# Patient Record
Sex: FEMALE | Race: BLACK OR AFRICAN AMERICAN | NOT HISPANIC OR LATINO | Employment: FULL TIME | URBAN - METROPOLITAN AREA
[De-identification: names, ages, dates, MRNs, and addresses within clinical notes are randomized per-mention and may not be internally consistent; named-entity substitution may affect disease eponyms.]

---

## 2017-03-24 ENCOUNTER — HOSPITAL ENCOUNTER (EMERGENCY)
Facility: HOSPITAL | Age: 22
Discharge: HOME | End: 2017-03-24
Attending: EMERGENCY MEDICINE
Payer: MEDICAID

## 2017-03-24 VITALS
DIASTOLIC BLOOD PRESSURE: 107 MMHG | TEMPERATURE: 99 F | HEART RATE: 85 BPM | WEIGHT: 176 LBS | SYSTOLIC BLOOD PRESSURE: 150 MMHG | HEIGHT: 64 IN | RESPIRATION RATE: 20 BRPM | OXYGEN SATURATION: 99 % | BODY MASS INDEX: 30.05 KG/M2

## 2017-03-24 DIAGNOSIS — L02.412 ABSCESS OF AXILLA, LEFT: Primary | ICD-10-CM

## 2017-03-24 PROCEDURE — 10060 I&D ABSCESS SIMPLE/SINGLE: CPT

## 2017-03-24 PROCEDURE — 25000003 PHARM REV CODE 250: Performed by: EMERGENCY MEDICINE

## 2017-03-24 PROCEDURE — 99283 EMERGENCY DEPT VISIT LOW MDM: CPT | Mod: 25

## 2017-03-24 RX ORDER — HYDROCODONE BITARTRATE AND ACETAMINOPHEN 5; 325 MG/1; MG/1
1 TABLET ORAL EVERY 4 HOURS PRN
Qty: 18 TABLET | Refills: 0 | Status: SHIPPED | OUTPATIENT
Start: 2017-03-24 | End: 2018-04-11

## 2017-03-24 RX ORDER — LIDOCAINE HYDROCHLORIDE 10 MG/ML
5 INJECTION INFILTRATION; PERINEURAL
Status: COMPLETED | OUTPATIENT
Start: 2017-03-24 | End: 2017-03-24

## 2017-03-24 RX ORDER — IBUPROFEN 400 MG/1
800 TABLET ORAL
Status: COMPLETED | OUTPATIENT
Start: 2017-03-24 | End: 2017-03-24

## 2017-03-24 RX ORDER — SULFAMETHOXAZOLE AND TRIMETHOPRIM 800; 160 MG/1; MG/1
1 TABLET ORAL 2 TIMES DAILY
Qty: 14 TABLET | Refills: 0 | Status: SHIPPED | OUTPATIENT
Start: 2017-03-24 | End: 2017-03-31

## 2017-03-24 RX ORDER — HYDROCODONE BITARTRATE AND ACETAMINOPHEN 5; 325 MG/1; MG/1
1 TABLET ORAL
Status: COMPLETED | OUTPATIENT
Start: 2017-03-24 | End: 2017-03-24

## 2017-03-24 RX ADMIN — IBUPROFEN 800 MG: 400 TABLET, FILM COATED ORAL at 08:03

## 2017-03-24 RX ADMIN — LIDOCAINE HYDROCHLORIDE 5 ML: 10 INJECTION, SOLUTION INFILTRATION; PERINEURAL at 08:03

## 2017-03-24 RX ADMIN — HYDROCODONE BITARTRATE AND ACETAMINOPHEN 1 TABLET: 5; 325 TABLET ORAL at 09:03

## 2017-03-24 NOTE — ED AVS SNAPSHOT
[facility]  [address]  [place] [address]               Meghna Dozier   3/24/2017  7:45 PM   ED    Description:  Female : 1995   Department:  [facility]           Your Care was Coordinated By:     Provider Role From To    Hamida Cedillo MD Attending Provider [phone] --      Reason for Visit     Abscess           Diagnoses this Visit        Comments    Abscess of axilla, left    -  Primary       ED Disposition     None           To Do List           Follow-up Information     Follow up with Primary Doctor No In 3 days.    Why:  For further care       These Medications        Disp Refills Start End    hydrocodone-acetaminophen 5-325mg (NORCO) 5-325 mg per tablet 18 tablet 0 3/24/2017     Take 1 tablet by mouth every 4 (four) hours as needed for Pain. - Oral    sulfamethoxazole-trimethoprim 800-160mg (BACTRIM DS) 800-160 mg Tab 14 tablet 0 3/24/2017 3/31/2017    Take 1 tablet by mouth 2 (two) times daily. - Oral      [facility]     [facility] Nurse Care Line -  Assistance  Registered nurses in the [facility] provide clinical advisement, health education, appointment booking, and other advisory services.  Call for this free service at [phone].             Medications           START taking these NEW medications        Refills    hydrocodone-acetaminophen 5-325mg (NORCO) 5-325 mg per tablet 0    Sig: Take 1 tablet by mouth every 4 (four) hours as needed for Pain.    Class: Print    Route: Oral    sulfamethoxazole-trimethoprim 800-160mg (BACTRIM DS) 800-160 mg Tab 0    Sig: Take 1 tablet by mouth 2 (two) times daily.    Class: Print    Route: Oral      These medications were administered today        Dose Freq    ibuprofen tablet 800 mg 800 mg ED 1 Time    Sig: Take 2 tablets (800 mg total) by mouth ED 1 Time.    Class: Normal    Route: Oral    lidocaine HCL 10 mg/ml (1%) injection 5 mL 5 mL ED 1 Time    Si  "mLs by Infiltration route ED 1 Time.    Class: Normal    Route: Infiltration    hydrocodone-acetaminophen 5-325mg per tablet 1 tablet 1 tablet ED 1 Time    Sig: Take 1 tablet by mouth ED 1 Time.    Class: Normal    Route: Oral           Verify that the below list of medications is an accurate representation of the medications you are currently taking.  If none reported, the list may be blank. If incorrect, please contact your healthcare provider. Carry this list with you in case of emergency.           Current Medications     hydrocodone-acetaminophen 5-325mg (NORCO) 5-325 mg per tablet Take 1 tablet by mouth every 4 (four) hours as needed for Pain.    sulfamethoxazole-trimethoprim 800-160mg (BACTRIM DS) 800-160 mg Tab Take 1 tablet by mouth 2 (two) times daily.           Clinical Reference Information           Your Vitals Were     BP Pulse Temp Resp Height Weight    156/79 (BP Location: Right arm, Patient Position: Lying) 103 98.4 °F (36.9 °C) (Oral) 16 5' 4" (1.626 m) 79.8 kg (176 lb)    Last Period SpO2 BMI          03/10/2017 (Approximate) 98% 30.21 kg/m2        Allergies as of 3/24/2017     No Known Allergies      Immunizations Administered on Date of Encounter - 3/24/2017     None      ED Micro, Lab, POCT     None      ED Imaging Orders     None        Discharge Instructions         Abscess (Incision & Drainage)  An abscess (sometimes called a boil) occurs when bacteria get trapped under the skin and start to grow. Pus forms inside the abscess as the body responds to the bacteria. An abscess can happen with an insect bite, ingrown hair, blocked oil gland, pimple, cyst, or puncture wound.  Your healthcare provider has drained the pus from your abscess. If the abscess pocket was large, your healthcare provider may have inserted gauze packing. Your provider will need to remove and possibly replace it on your next visit. You may not need antibiotics to treat a simple abscess, unless the infection is spreading " into the skin around the wound (cellulitis).  Healing of the wound will take about 1 to 2 weeks, depending on the size of the abscess. Healthy tissue will grow from the bottom and sides of the opening until it seals over.  Home care  These tips can help your wound heal:  · The wound may drain for the first 2 days. Cover the wound with a clean dry dressing. If the dressing becomes soaked with blood or pus, change it.  · If a gauze packing was placed inside the abscess cavity, you may be told to remove it yourself. You may do this in the shower. Once the packing is removed, you should wash the area in the shower or bath 3 to 4 times a day, until the skin opening has closed. Make sure you wash your hands after changing the packing or cleaning the wound.  · If you were prescribed antibiotics, take them as directed until they are all gone.  · You may use acetaminophen or ibuprofen to control pain, unless another pain medicine was prescribed. If you have liver disease or ever had a stomach ulcer, talk with your doctor before using these medicines.  Follow-up care  Follow up with your healthcare provider, or as advised. If a gauze packing was inserted in your wound, it should be removed in 1 to 2 days. Check your wound every day for the signs of worsening infection listed below.  When to seek medical advice  Call your healthcare provider right away if any of these occur:  · Increasing redness or swelling  · Red streaks in the skin leading away from the wound  · Increasing local pain or swelling  · Continued pus draining from the wound 2 days after treatment  · Fever of 100.4ºF (38ºC) or higher, or as directed by your healthcare provider  · Boil returns when you are at home  Date Last Reviewed: 9/1/2016  © 9911-8655 [employer]. [address] [place] [address], [place] [address]. All rights reserved. This information is not intended as a substitute for professional medical care. Always follow your healthcare  professional's instructions.          [facility] Sign-Up     Activating your [facility] account is as easy as 1-2-3!     1) Visit [email or web address], select Sign Up Now, enter this activation code and your date of birth, then select Next.  [email or web address]  Expires: 5/8/2017  9:22 PM      2) Create a username and password to use when you visit [facility] in the future and select a security question in case you lose your password and select Next.    3) Enter your e-mail address and click Sign Up!    Additional Information  If you have questions, please e-mail [email or web address] or call [phone] to talk to our [facility] staff. Remember, [facility] is NOT to be used for urgent needs. For medical emergencies, dial 911.          [facility] complies with applicable Federal civil rights laws and does not discriminate on the basis of race, color, national origin, age, disability, or sex.        Language Assistance Services     ATTENTION: Language assistance services are available, free of charge. Please call [phone].      ATENCIÓN: Si habla español, tiene a saldaña disposición servicios gratuitos de asistencia lingüística. Llame al [phone].     CHÚ Ý: N?u b?n nói Ti?ng Vi?t, có các d?ch v? h? tr? ngôn ng? mi?n phí dành cho b?n. G?i s? [phone].

## 2017-03-25 NOTE — ED PROVIDER NOTES
Encounter Date: 3/24/2017       History     Chief Complaint   Patient presents with    Abscess     Review of patient's allergies indicates:  No Known Allergies  Patient is a 21 y.o. female presenting with the following complaint: abscess. The history is provided by the patient.   Abscess    This is a new problem. The current episode started two days ago. The problem occurs continuously. The problem has been unchanged. Affected Location: Left axilla. The pain is at a severity of 8/10. The abscess is characterized by redness, painfulness, burning and draining. Pertinent negatives include not sleeping less, no fever, no diarrhea, no vomiting and no congestion.     Past Medical History:   Diagnosis Date    Anxiety      History reviewed. No pertinent surgical history.  History reviewed. No pertinent family history.  Social History   Substance Use Topics    Smoking status: Never Smoker    Smokeless tobacco: None    Alcohol use No     Review of Systems   Constitutional: Negative for fever.   HENT: Negative for congestion.    Gastrointestinal: Negative for diarrhea and vomiting.   Skin: Positive for wound.   All other systems reviewed and are negative.      Physical Exam   Initial Vitals   BP Pulse Resp Temp SpO2   03/24/17 1946 03/24/17 1946 03/24/17 1946 03/24/17 1946 03/24/17 1946   156/79 103 16 98.4 °F (36.9 °C) 98 %     Physical Exam    Nursing note and vitals reviewed.  Constitutional: She appears well-developed and well-nourished.   HENT:   Head: Normocephalic and atraumatic.   Eyes: EOM are normal.   Neck: Normal range of motion. Neck supple.   Cardiovascular: Normal rate, regular rhythm, normal heart sounds and intact distal pulses.   Pulmonary/Chest: Breath sounds normal.   Abdominal: Soft.   Musculoskeletal: Normal range of motion.   Neurological: She is alert and oriented to person, place, and time.   Skin: Skin is warm and dry. Abscess noted.        Psychiatric: She has a normal mood and affect. Her  behavior is normal. Judgment and thought content normal.         ED Course   I & D - Incision and Drainage  Date/Time: 3/24/2017 9:16 PM  Location procedure was performed: [facility] EMERGENCY DEPARTMENT  Performed by: HAMIDA CEDILLO  Authorized by: HAMIDA CEDILLO   Assisting provider: HAMIDA CEDILLO  Pre-operative diagnosis: Left axillary abscess  Post-operative diagnosis: Left axillary abscess  Consent Done: Emergent Situation  Type: abscess  Body area: upper extremity  Location details: left shoulder  Anesthesia: local infiltration    Anesthesia:  Anesthesia: local infiltration  Local Anesthetic: lidocaine 1% without epinephrine   Anesthetic total: 4 mL  Patient sedated: no  Scalpel size: 11  Incision type: single straight  Complexity: simple  Drainage: pus  Drainage amount: moderate  Wound treatment: incision,  wound left open,  drainage and  wound packed  Packing material: 1/4 in iodoform gauze  Complications: No  Specimens: No  Implants: No        Labs Reviewed - No data to display                            ED Course     Clinical Impression:   The encounter diagnosis was Abscess of axilla, left.    Disposition:   Disposition: Discharged  Condition: Stable       Hamida Cedillo MD  03/24/17 6726

## 2017-03-25 NOTE — ED NOTES
Pt reports having an abscess in her armpit for two days causing pain. Pt awake and alert at this time and in stable condition.

## 2017-04-01 ENCOUNTER — HOSPITAL ENCOUNTER (EMERGENCY)
Facility: HOSPITAL | Age: 22
Discharge: HOME | End: 2017-04-01
Attending: EMERGENCY MEDICINE
Payer: MEDICAID

## 2017-04-01 VITALS
SYSTOLIC BLOOD PRESSURE: 146 MMHG | WEIGHT: 175 LBS | OXYGEN SATURATION: 98 % | HEART RATE: 76 BPM | BODY MASS INDEX: 29.88 KG/M2 | DIASTOLIC BLOOD PRESSURE: 95 MMHG | TEMPERATURE: 98 F | HEIGHT: 64 IN | RESPIRATION RATE: 22 BRPM

## 2017-04-01 DIAGNOSIS — N93.8 DUB (DYSFUNCTIONAL UTERINE BLEEDING): Primary | ICD-10-CM

## 2017-04-01 LAB
ALBUMIN SERPL BCP-MCNC: 3.7 G/DL
ALP SERPL-CCNC: 65 U/L
ALT SERPL W/O P-5'-P-CCNC: 12 U/L
ANION GAP SERPL CALC-SCNC: 11 MMOL/L
AST SERPL-CCNC: 15 U/L
B-HCG UR QL: NEGATIVE
BACTERIA GENITAL QL WET PREP: ABNORMAL
BASOPHILS # BLD AUTO: 0.05 K/UL
BASOPHILS NFR BLD: 0.7 %
BILIRUB SERPL-MCNC: 0.6 MG/DL
BILIRUB UR QL STRIP: NEGATIVE
BUN SERPL-MCNC: 7 MG/DL
CALCIUM SERPL-MCNC: 9.9 MG/DL
CHLORIDE SERPL-SCNC: 105 MMOL/L
CLARITY UR: CLEAR
CLUE CELLS VAG QL WET PREP: ABNORMAL
CO2 SERPL-SCNC: 24 MMOL/L
COLOR UR: YELLOW
CREAT SERPL-MCNC: 0.9 MG/DL
CTP QC/QA: YES
DIFFERENTIAL METHOD: NORMAL
EOSINOPHIL # BLD AUTO: 0.2 K/UL
EOSINOPHIL NFR BLD: 2.4 %
ERYTHROCYTE [DISTWIDTH] IN BLOOD BY AUTOMATED COUNT: 12.8 %
EST. GFR  (AFRICAN AMERICAN): >60 ML/MIN/1.73 M^2
EST. GFR  (NON AFRICAN AMERICAN): >60 ML/MIN/1.73 M^2
FILAMENT FUNGI VAG WET PREP-#/AREA: ABNORMAL
GLUCOSE SERPL-MCNC: 78 MG/DL
GLUCOSE UR QL STRIP: NEGATIVE
HCT VFR BLD AUTO: 38.6 %
HGB BLD-MCNC: 12.9 G/DL
HGB UR QL STRIP: ABNORMAL
KETONES UR QL STRIP: NEGATIVE
LEUKOCYTE ESTERASE UR QL STRIP: NEGATIVE
LYMPHOCYTES # BLD AUTO: 2.5 K/UL
LYMPHOCYTES NFR BLD: 36.9 %
MCH RBC QN AUTO: 28.3 PG
MCHC RBC AUTO-ENTMCNC: 33.4 %
MCV RBC AUTO: 85 FL
MICROSCOPIC COMMENT: NORMAL
MONOCYTES # BLD AUTO: 0.8 K/UL
MONOCYTES NFR BLD: 12.3 %
NEUTROPHILS # BLD AUTO: 3.2 K/UL
NEUTROPHILS NFR BLD: 47.7 %
NITRITE UR QL STRIP: NEGATIVE
PH UR STRIP: 6 [PH] (ref 5–8)
PLATELET # BLD AUTO: 338 K/UL
PMV BLD AUTO: 10.9 FL
POTASSIUM SERPL-SCNC: 4.4 MMOL/L
PROT SERPL-MCNC: 8.2 G/DL
PROT UR QL STRIP: NEGATIVE
RBC # BLD AUTO: 4.56 M/UL
RBC #/AREA URNS HPF: 1 /HPF (ref 0–4)
SODIUM SERPL-SCNC: 140 MMOL/L
SP GR UR STRIP: 1.01 (ref 1–1.03)
SPECIMEN SOURCE: ABNORMAL
T VAGINALIS GENITAL QL WET PREP: ABNORMAL
URN SPEC COLLECT METH UR: ABNORMAL
UROBILINOGEN UR STRIP-ACNC: NEGATIVE EU/DL
WBC # BLD AUTO: 6.77 K/UL
WBC #/AREA VAG WET PREP: ABNORMAL
YEAST GENITAL QL WET PREP: ABNORMAL

## 2017-04-01 PROCEDURE — 85025 COMPLETE CBC W/AUTO DIFF WBC: CPT

## 2017-04-01 PROCEDURE — 87210 SMEAR WET MOUNT SALINE/INK: CPT

## 2017-04-01 PROCEDURE — 99284 EMERGENCY DEPT VISIT MOD MDM: CPT | Mod: 25

## 2017-04-01 PROCEDURE — 80053 COMPREHEN METABOLIC PANEL: CPT

## 2017-04-01 PROCEDURE — 87591 N.GONORRHOEAE DNA AMP PROB: CPT

## 2017-04-01 PROCEDURE — 81000 URINALYSIS NONAUTO W/SCOPE: CPT

## 2017-04-01 PROCEDURE — 81025 URINE PREGNANCY TEST: CPT | Performed by: EMERGENCY MEDICINE

## 2017-04-01 NOTE — ED TRIAGE NOTES
Pt. Presents with vaginal bleeding that began in mid-March accompanied by abdominal cramping that began yesterday.

## 2017-04-01 NOTE — ED PROVIDER NOTES
"Encounter Date: 4/1/2017    SCRIBE #1 NOTE: I, Raj Green, am scribing for, and in the presence of,  Praveena Quintanilla NP. I have scribed the following portions of the note - Other sections scribed: HPI and ROS.       History     Chief Complaint   Patient presents with    Vaginal Bleeding     pt states " I've been bleeding for a month"      Review of patient's allergies indicates:  No Known Allergies  HPI Comments: CC: Vaginal Bleeding    HPI: This 21 y.o F with anxiety presents to the ED c/o acute onset of constant vaginal bleeding x1 month. The pt also reports one episode of abdominal cramping yesterday. The pt notes her menustral cycle comes every 3 months for 3 days. The pt is not currently taking birth control or Depo-Provera. The pt states her pregnancy test was negative. The pt denies dysuria, fever, vaginal discharge or odor, change in sexual partners or activity change. No prior tx.     The history is provided by the patient. No  was used.     Past Medical History:   Diagnosis Date    Anxiety      History reviewed. No pertinent surgical history.  History reviewed. No pertinent family history.  Social History   Substance Use Topics    Smoking status: Never Smoker    Smokeless tobacco: None    Alcohol use No     Review of Systems   Constitutional: Negative for activity change and fever.   Respiratory: Negative for shortness of breath.    Cardiovascular: Negative for chest pain.   Gastrointestinal: Negative for nausea.        (+) abdominal cramping   Genitourinary: Positive for vaginal bleeding (x1 month). Negative for dysuria and vaginal discharge.   Musculoskeletal: Negative for back pain.   Skin: Negative for rash.   Neurological: Negative for weakness.   Hematological: Does not bruise/bleed easily.       Physical Exam   Initial Vitals   BP Pulse Resp Temp SpO2   04/01/17 1244 04/01/17 1244 04/01/17 1244 04/01/17 1244 04/01/17 1244   161/79 78 18 98.5 °F (36.9 °C) 99 % " "    Physical Exam    Nursing note and vitals reviewed.  Constitutional: She appears well-developed and well-nourished.   HENT:   Head: Normocephalic.   Eyes: Conjunctivae are normal.   Neck: Normal range of motion. Neck supple.   Cardiovascular: Normal rate, regular rhythm and normal heart sounds.   Pulmonary/Chest: Breath sounds normal.   Abdominal: Soft. She exhibits no distension. There is no tenderness. There is no rebound and no guarding.   Genitourinary: Vagina normal.   Genitourinary Comments: Active mild bleeding.    (-) CMT  (-) Adnexal tenderness / masses   Musculoskeletal: Normal range of motion.   Neurological: She is alert and oriented to person, place, and time.   Skin: Skin is warm and dry.         ED Course   Procedures  Labs Reviewed   URINALYSIS - Abnormal; Notable for the following:        Result Value    Occult Blood UA 2+ (*)     All other components within normal limits   VAGINAL SCREEN - Abnormal; Notable for the following:     WBC - Vaginal Screen Rare (*)     Bacteria - Vaginal Screen Rare (*)     All other components within normal limits   C. TRACHOMATIS/N. GONORRHOEAE BY AMP DNA   CBC W/ AUTO DIFFERENTIAL   COMPREHENSIVE METABOLIC PANEL   URINALYSIS MICROSCOPIC   POCT URINE PREGNANCY             Medical Decision Making:   Initial Assessment:   21-year-old female presents with vaginal bleeding ×1 month.  Differential Diagnosis:   Missed AB  Dysfunctional uterine bleeding   vaginal trauma  ED Management:  Diagnosis management comments: This is an urgent evaluation of a 21-year-old female that presented to the ER with c/o vaginal bleeding ×1 month.  Patient states that she had one cramp" yesterday. Pts exam was unremarkable except for mild vaginal bleeding.  There is no abdominal tenderness.  No cervical motion tenderness, no adnexal tenderness or mass.; pt does not appear ill or toxic.    I will send GC and chlamydia and a vaginal screen.  We will check some basic labs just to make sure her " H&H is within normal limits.  I anticipate with be fine being her vital signs are within normal limits.    Labs were reviewed and discussed with pt.     Based on exam today - I have low suspicion for medical, surgical or other life threatening illness.  Patient has been instructed to follow up with family practice physician or an OB/GYN.  Referral for OB/GYN on call given to patient.    Pt verbalizes understanding of d/c instructions and will return for worsening condition.    Case discussed with attending who agrees with A&P              Scribe Attestation:   Scribe #1: I performed the above scribed service and the documentation accurately describes the services I performed. I attest to the accuracy of the note.    Attending Attestation:           Physician Attestation for Scribe:  Physician Attestation Statement for Scribe #1: I, Praveena Quintanilla NP, reviewed documentation, as scribed by Raj Green in my presence, and it is both accurate and complete.                 ED Course     Clinical Impression:   The encounter diagnosis was DUB (dysfunctional uterine bleeding).    Disposition:   Disposition: Discharged  Condition: Stable       Praveena Quintanilla NP  04/01/17 1504

## 2017-04-01 NOTE — ED AVS SNAPSHOT
[facility]  [place] [address]               Meghna Dozier   2017 12:46 PM   ED    Description:  Female : 1995   Department:  [facility]           Your Care was Coordinated By:     Provider Role From To    Melba Preston MD Attending Provider [phone] --    Praveena Quintanilla NP Nurse Practitioner 17 [phone] --      Reason for Visit     Vaginal Bleeding           Diagnoses this Visit        Comments    DUB (dysfunctional uterine bleeding)    -  Primary       ED Disposition     None           To Do List           Follow-up Information     Schedule an appointment as soon as possible for a visit with Hubert Dominguez MD.    Specialty:  Obstetrics    Contact information:    [address]  [address]  [place] [address]  [phone]          Follow up with [facility].    Specialty:  Emergency Medicine    Why:  If symptoms worsen or any other concerns    Contact information:    [place] [address] [phone]      [facility]     [facility] Care [facility] -  Assistance  Unless otherwise directed by your provider, please contact [facility], our nurse care line that is available for  assistance.     Registered nurses in the [facility] provide: appointment scheduling, clinical advisement, health education, and other advisory services.  Call: [phone] (toll free)               Medications                Verify that the below list of medications is an accurate representation of the medications you are currently taking.  If none reported, the list may be blank. If incorrect, please contact your healthcare provider. Carry this list with you in case of emergency.           Current Medications     hydrocodone-acetaminophen 5-325mg (NORCO) 5-325 mg per tablet Take 1 tablet by mouth every 4 (four) hours as needed for Pain.           Clinical  "Reference Information           Your Vitals Were     BP Pulse Temp Resp Height Weight    161/79 (BP Location: Right arm, Patient Position: Sitting) 78 98.5 °F (36.9 °C) (Oral) 18 5' 4" (1.626 m) 79.4 kg (175 lb)    Last Period SpO2 BMI          03/10/2017 (Approximate) 99% 30.04 kg/m2        Allergies as of 4/1/2017     No Known Allergies      Immunizations Administered on Date of Encounter - 4/1/2017     None      ED Micro, Lab, POCT     Start Ordered       Status Ordering Provider    04/01/17 1301 04/01/17 1300  CBC W/ AUTO DIFFERENTIAL  STAT      Preliminary result     04/01/17 1301 04/01/17 1300  Comp. Metabolic Panel  STAT      Final result     04/01/17 1301 04/01/17 1300  Urinalysis  STAT      Final result     04/01/17 1301 04/01/17 1300  C. trachomatis/N. gonorrhoeae by AMP DNA Cervix  STAT      In process     04/01/17 1301 04/01/17 1300  Vaginal Screen Vagina  STAT      Final result     04/01/17 1300 04/01/17 1300  Urinalysis Microscopic  Once      Final result     04/01/17 1256 04/01/17 1256  POCT urine pregnancy  Once      Final result       ED Imaging Orders     None        Discharge Instructions         Understanding Uterine Bleeding  Your uterine bleeding may be heavy. Or you may have bleeding between periods. These problems may be caused by hormonal imbalance. Or they can be caused by uterine growths, an intrauterine device (IUD), bleeding disorder, or pregnancy.  Hormonal imbalance  Your menstrual cycle is controlled by hormones. The hormones include estrogen and progesterone. Sometimes there is too much or too little of 1 or both of these hormones. This can cause heavy periods. Or it can cause bleeding between periods. Causes of hormonal imbalance can include:  · Hormonal changes in teens and in women nearing menopause  · Diabetes, thyroid disease, or other medical problems  · Obesity  · Stress  · Strenuous exercise  · Anorexia (an eating disorder)  · Pregnancy  Uterine growths  There are different " kinds of uterine growths. These include:  · Fibroids. These are round knots of muscle tissue in the uterus.  · Polyps. These are soft tissue growths in the uterine lining. They often hang into the uterus.  · Adenomyosis. This is when the uterine lining grows into the muscle wall.  · Hyperplasia. This is when the uterine lining gets too thick or grows too much.  · Endometrial cancer. This is uncontrolled growth of part of the uterine lining.  Other causes of uterine bleeding  There are other causes of uterine bleeding. These include:  · IUD (intrauterine device). This is a method of birth control. Some IUDs contain hormones.  · Bleeding disorders. This is when the blood can't clot properly.  Treatment  Your health care provider can help diagnose the cause of your bleeding problem. He or she will work then work with you to plan treatment as needed.  Date Last Reviewed: 7/6/2015  © 7136-3327 [employer]. [address] [place] [address]. All rights reserved. This information is not intended as a substitute for professional medical care. Always follow your healthcare professional's instructions.          [facility] Sign-Up     Activating your [facility] account is as easy as 1-2-3!     1) Visit [email or web address], select Sign Up Now, enter this activation code and your date of birth, then select Next.  [email or web address]  Expires: 5/8/2017  9:22 PM      2) Create a username and password to use when you visit [facility] in the future and select a security question in case you lose your password and select Next.    3) Enter your e-mail address and click Sign Up!    Additional Information  If you have questions, please e-mail [email or web address] or call [phone] to talk to our [facility] staff. Remember, [facility] is NOT to be used for urgent needs. For medical emergencies, dial 911.          [facility] complies with applicable Federal civil rights laws and does not discriminate on  the basis of race, color, national origin, age, disability, or sex.        Language Assistance Services     ATTENTION: Language assistance services are available, free of charge. Please call [phone].      ATENCIÓN: Si ramirez hollis, tiene a saldaña disposición servicios gratuitos de asistencia lingüística. Llame al [phone].     CHÚ Ý: N?u b?n nói Ti?ng Vi?t, có các d?ch v? h? tr? ngôn ng? mi?n phí dành cho b?n. G?i s? [phone].

## 2017-04-01 NOTE — DISCHARGE INSTRUCTIONS
Understanding Uterine Bleeding  Your uterine bleeding may be heavy. Or you may have bleeding between periods. These problems may be caused by hormonal imbalance. Or they can be caused by uterine growths, an intrauterine device (IUD), bleeding disorder, or pregnancy.  Hormonal imbalance  Your menstrual cycle is controlled by hormones. The hormones include estrogen and progesterone. Sometimes there is too much or too little of 1 or both of these hormones. This can cause heavy periods. Or it can cause bleeding between periods. Causes of hormonal imbalance can include:  · Hormonal changes in teens and in women nearing menopause  · Diabetes, thyroid disease, or other medical problems  · Obesity  · Stress  · Strenuous exercise  · Anorexia (an eating disorder)  · Pregnancy  Uterine growths  There are different kinds of uterine growths. These include:  · Fibroids. These are round knots of muscle tissue in the uterus.  · Polyps. These are soft tissue growths in the uterine lining. They often hang into the uterus.  · Adenomyosis. This is when the uterine lining grows into the muscle wall.  · Hyperplasia. This is when the uterine lining gets too thick or grows too much.  · Endometrial cancer. This is uncontrolled growth of part of the uterine lining.  Other causes of uterine bleeding  There are other causes of uterine bleeding. These include:  · IUD (intrauterine device). This is a method of birth control. Some IUDs contain hormones.  · Bleeding disorders. This is when the blood can't clot properly.  Treatment  Your health care provider can help diagnose the cause of your bleeding problem. He or she will work then work with you to plan treatment as needed.  Date Last Reviewed: 7/6/2015  © 5910-3412 [employer]. [address] [place] [address], [place] [address]. All rights reserved. This information is not intended as a substitute for professional medical care. Always follow your healthcare professional's  instructions.

## 2017-04-02 LAB
C TRACH DNA SPEC QL NAA+PROBE: NOT DETECTED
N GONORRHOEA DNA SPEC QL NAA+PROBE: NOT DETECTED

## 2017-07-27 ENCOUNTER — HOSPITAL ENCOUNTER (EMERGENCY)
Facility: HOSPITAL | Age: 22
Discharge: HOME | End: 2017-07-28
Attending: EMERGENCY MEDICINE
Payer: COMMERCIAL

## 2017-07-27 DIAGNOSIS — D50.0 IRON DEFICIENCY ANEMIA DUE TO CHRONIC BLOOD LOSS: Primary | ICD-10-CM

## 2017-07-27 DIAGNOSIS — R05.9 COUGH: ICD-10-CM

## 2017-07-27 LAB
ANION GAP SERPL CALC-SCNC: 10 MMOL/L
B-HCG UR QL: NEGATIVE
BASOPHILS # BLD AUTO: 0.05 K/UL
BASOPHILS NFR BLD: 0.6 %
BILIRUB UR QL STRIP: NEGATIVE
BUN SERPL-MCNC: 15 MG/DL
CALCIUM SERPL-MCNC: 9.9 MG/DL
CHLORIDE SERPL-SCNC: 106 MMOL/L
CLARITY UR: CLEAR
CO2 SERPL-SCNC: 26 MMOL/L
COLOR UR: YELLOW
CREAT SERPL-MCNC: 0.9 MG/DL
CTP QC/QA: YES
DIFFERENTIAL METHOD: ABNORMAL
EOSINOPHIL # BLD AUTO: 0.2 K/UL
EOSINOPHIL NFR BLD: 1.9 %
ERYTHROCYTE [DISTWIDTH] IN BLOOD BY AUTOMATED COUNT: 14 %
EST. GFR  (AFRICAN AMERICAN): >60 ML/MIN/1.73 M^2
EST. GFR  (NON AFRICAN AMERICAN): >60 ML/MIN/1.73 M^2
GLUCOSE SERPL-MCNC: 107 MG/DL
GLUCOSE UR QL STRIP: NEGATIVE
HCT VFR BLD AUTO: 32.7 %
HGB BLD-MCNC: 10.3 G/DL
HGB UR QL STRIP: NEGATIVE
KETONES UR QL STRIP: NEGATIVE
LEUKOCYTE ESTERASE UR QL STRIP: NEGATIVE
LYMPHOCYTES # BLD AUTO: 2.8 K/UL
LYMPHOCYTES NFR BLD: 31.3 %
MAGNESIUM SERPL-MCNC: 2.1 MG/DL
MCH RBC QN AUTO: 25.4 PG
MCHC RBC AUTO-ENTMCNC: 31.5 G/DL
MCV RBC AUTO: 81 FL
MONOCYTES # BLD AUTO: 0.7 K/UL
MONOCYTES NFR BLD: 8 %
NEUTROPHILS # BLD AUTO: 5.3 K/UL
NEUTROPHILS NFR BLD: 58.2 %
NITRITE UR QL STRIP: NEGATIVE
PH UR STRIP: 5 [PH] (ref 5–8)
PLATELET # BLD AUTO: 403 K/UL
PMV BLD AUTO: 9.9 FL
POTASSIUM SERPL-SCNC: 4 MMOL/L
PROT UR QL STRIP: NEGATIVE
RBC # BLD AUTO: 4.06 M/UL
SODIUM SERPL-SCNC: 142 MMOL/L
SP GR UR STRIP: 1.02 (ref 1–1.03)
TSH SERPL DL<=0.005 MIU/L-ACNC: 1.23 UIU/ML
URN SPEC COLLECT METH UR: NORMAL
UROBILINOGEN UR STRIP-ACNC: NEGATIVE EU/DL
WBC # BLD AUTO: 9.01 K/UL

## 2017-07-27 PROCEDURE — 85025 COMPLETE CBC W/AUTO DIFF WBC: CPT

## 2017-07-27 PROCEDURE — 81025 URINE PREGNANCY TEST: CPT | Performed by: EMERGENCY MEDICINE

## 2017-07-27 PROCEDURE — 99284 EMERGENCY DEPT VISIT MOD MDM: CPT

## 2017-07-27 PROCEDURE — 81003 URINALYSIS AUTO W/O SCOPE: CPT

## 2017-07-27 PROCEDURE — 86850 RBC ANTIBODY SCREEN: CPT

## 2017-07-27 PROCEDURE — 80048 BASIC METABOLIC PNL TOTAL CA: CPT

## 2017-07-27 PROCEDURE — 93005 ELECTROCARDIOGRAM TRACING: CPT

## 2017-07-27 PROCEDURE — 84443 ASSAY THYROID STIM HORMONE: CPT

## 2017-07-27 PROCEDURE — 83735 ASSAY OF MAGNESIUM: CPT

## 2017-07-27 PROCEDURE — 86900 BLOOD TYPING SEROLOGIC ABO: CPT

## 2017-07-27 RX ORDER — AMLODIPINE BESYLATE 10 MG/1
10 TABLET ORAL DAILY
COMMUNITY
End: 2018-07-11

## 2017-07-27 RX ORDER — ONDANSETRON 4 MG/1
4 TABLET, ORALLY DISINTEGRATING ORAL EVERY 8 HOURS PRN
Qty: 12 TABLET | Refills: 0 | Status: SHIPPED | OUTPATIENT
Start: 2017-07-27 | End: 2018-04-11

## 2017-07-27 RX ORDER — FERROUS SULFATE 325(65) MG
325 TABLET ORAL DAILY
Qty: 30 TABLET | Refills: 2 | Status: SHIPPED | OUTPATIENT
Start: 2017-07-27 | End: 2018-04-11

## 2017-07-28 VITALS
TEMPERATURE: 98 F | SYSTOLIC BLOOD PRESSURE: 149 MMHG | OXYGEN SATURATION: 100 % | HEART RATE: 98 BPM | RESPIRATION RATE: 18 BRPM | DIASTOLIC BLOOD PRESSURE: 84 MMHG

## 2017-07-28 LAB
ABO + RH BLD: NORMAL
BLD GP AB SCN CELLS X3 SERPL QL: NORMAL

## 2017-07-28 NOTE — ED TRIAGE NOTES
Pt states loss of appetite, fatigue x 3 days. Pt states her PCP told her she was anemic. Pt states 3 falls in last week. Denies pain, nv.

## 2017-07-28 NOTE — ED PROVIDER NOTES
"Encounter Date: 7/27/2017    SCRIBE #1 NOTE: I, Alana Green, am scribing for, and in the presence of,  Siddhartha Smith MD. I have scribed the following portions of the note - Other sections scribed: HPI/ROS/PE.       History     Chief Complaint   Patient presents with    Fatigue     decreased appetite, weakness, fell in shower 3 times     CC: Fatigue    HPI: This 21 y.o. Female with a medical history of anxiety and HTN presents to the ED c/o constant, moderate (7/10) fatigue with associated appetite decrease ("not hungry"), generalized weakness, and abdominal pain "for the past few days." She states that she rarely and only "eats small bits" of food including pork chops, but drinks water. Patient reports a dry cough for the past 1.5 months with associated headaches. Patient notes 2 episodes of "passing out" (5 and 6 days ago) while in the shower. She reports seeing her PCP 5 days ago where she was diagnosed with anemia after stating that "I eat a lot of ice." She was then prescribed iron pills. Patient also reports constant vaginal bleeding since March, but symptoms resided when she took Provera prescribed by an OBGYN at [facility]. No other tx reported. No alleviating or exacerbating factors. Patient denies LOC, fever, chills, chest pain, and congestion.       The history is provided by the patient. No  was used.     Review of patient's allergies indicates:  No Known Allergies  Past Medical History:   Diagnosis Date    Anxiety     Hypertension      No past surgical history on file.  No family history on file.  Social History   Substance Use Topics    Smoking status: Never Smoker    Smokeless tobacco: Not on file    Alcohol use No     Review of Systems   Constitutional: Positive for appetite change (decrease) and fatigue. Negative for chills and fever.   HENT: Negative for congestion.    Eyes: Negative for visual disturbance.   Respiratory: Positive for cough (dry).  "   Cardiovascular: Negative for chest pain.   Gastrointestinal: Positive for abdominal pain. Negative for diarrhea, nausea and vomiting.   Genitourinary: Negative for difficulty urinating.   Musculoskeletal: Negative for back pain, neck pain and neck stiffness.   Skin: Negative for rash.   Neurological: Positive for weakness (generalized) and headaches. Negative for numbness.       Physical Exam     Initial Vitals [07/27/17 2051]   BP Pulse Resp Temp SpO2   (!) 143/80 (!) 120 16 98.3 °F (36.8 °C) 100 %      MAP       101         Physical Exam    Nursing note and vitals reviewed.  Constitutional: She appears well-developed and well-nourished.  Non-toxic appearance. She does not appear ill. No distress.   HENT:   Head: Normocephalic and atraumatic.   Eyes: Conjunctivae and EOM are normal. Pupils are equal, round, and reactive to light.   Neck: Normal range of motion. Neck supple.   Cardiovascular: Regular rhythm. Tachycardia present.  Exam reveals no gallop and no friction rub.    No murmur heard.  Pulmonary/Chest: Effort normal and breath sounds normal. No respiratory distress. She has no wheezes. She exhibits no tenderness.   Abdominal: Soft. Normal appearance and bowel sounds are normal. She exhibits no distension. There is no tenderness.   Musculoskeletal: Normal range of motion. She exhibits no edema.   Neurological: She is alert. No cranial nerve deficit or sensory deficit.   Skin: Skin is warm and dry. No erythema.   Psychiatric: She has a normal mood and affect.         ED Course   Procedures  Labs Reviewed   CBC W/ AUTO DIFFERENTIAL   BASIC METABOLIC PANEL   MAGNESIUM   URINALYSIS   TSH   POCT URINE PREGNANCY   TYPE & SCREEN             Medical Decision Making:   Patient appears very well.  She likely has iron deficiency anemia.  She also has a decreased appetite.  Her electrolyte stable.  She has no leukocytosis abdominal tenderness fever or evidence of urinary tract infection.  I will start her on Prilosec  and Zofran though hopefully help increase her appetite.  Her chest x-ray shows no signs of acute cardio pulmonary disease.  She was prescribed Provera by an OB/GYN several months ago and has evidence of facial hair.  I wonder if she has PEC OS causing her chronic bleeding.  Her bleeding has stopped since she took Provera 2 days ago.  This will likely help with her anemia as well as starting oral iron supplementation.  I don't believe she needs admission at this time for anemia.  Her tachycardia is relatively unexplained but she doesn't appear to be septic or toxic.            Scribe Attestation:   Scribe #1: I performed the above scribed service and the documentation accurately describes the services I performed. I attest to the accuracy of the note.    Attending Attestation:           Physician Attestation for Scribe:  Physician Attestation Statement for Scribe #1: I, Siddhartha Smith MD, reviewed documentation, as scribed by Alana Green in my presence, and it is both accurate and complete.                 ED Course     Clinical Impression:   The primary encounter diagnosis was Iron deficiency anemia due to chronic blood loss. A diagnosis of Cough was also pertinent to this visit.                           Siddhartha Smith MD  07/28/17 7652

## 2017-10-17 ENCOUNTER — CLINICAL SUPPORT (OUTPATIENT)
Dept: OCCUPATIONAL MEDICINE | Facility: CLINIC | Age: 22
End: 2017-10-17

## 2017-10-17 DIAGNOSIS — Z02.1 PRE-EMPLOYMENT DRUG SCREENING: Primary | ICD-10-CM

## 2017-10-17 NOTE — PROGRESS NOTES
Subjective:       Patient ID: Meghna Dozier is a 22 y.o. female.    Chief Complaint: Drug / Alcohol Assessment    Patient here for drug screen.      ROS    Objective:      Physical Exam    Assessment:       No diagnosis found.    Plan:                   No Follow-up on file.

## 2017-12-20 ENCOUNTER — HOSPITAL ENCOUNTER (EMERGENCY)
Facility: HOSPITAL | Age: 22
Discharge: HOME | End: 2017-12-20
Attending: FAMILY MEDICINE
Payer: COMMERCIAL

## 2017-12-20 VITALS
RESPIRATION RATE: 19 BRPM | HEART RATE: 98 BPM | WEIGHT: 170 LBS | DIASTOLIC BLOOD PRESSURE: 76 MMHG | SYSTOLIC BLOOD PRESSURE: 133 MMHG | TEMPERATURE: 98 F | OXYGEN SATURATION: 99 % | BODY MASS INDEX: 32.12 KG/M2

## 2017-12-20 DIAGNOSIS — R05.9 COUGH: ICD-10-CM

## 2017-12-20 DIAGNOSIS — F41.9 ANXIETY: Primary | ICD-10-CM

## 2017-12-20 DIAGNOSIS — R07.9 CHEST PAIN: ICD-10-CM

## 2017-12-20 DIAGNOSIS — R05.3 CHRONIC COUGH: ICD-10-CM

## 2017-12-20 LAB
B-HCG UR QL: NEGATIVE
FLUAV AG SPEC QL IA: NEGATIVE
FLUBV AG SPEC QL IA: NEGATIVE
SPECIMEN SOURCE: NORMAL

## 2017-12-20 PROCEDURE — 81025 URINE PREGNANCY TEST: CPT

## 2017-12-20 PROCEDURE — 93005 ELECTROCARDIOGRAM TRACING: CPT

## 2017-12-20 PROCEDURE — 93010 ELECTROCARDIOGRAM REPORT: CPT | Mod: ,,, | Performed by: INTERNAL MEDICINE

## 2017-12-20 PROCEDURE — 99284 EMERGENCY DEPT VISIT MOD MDM: CPT | Mod: 25

## 2017-12-20 PROCEDURE — 87400 INFLUENZA A/B EACH AG IA: CPT

## 2017-12-21 NOTE — ED PROVIDER NOTES
Encounter Date: 12/20/2017       History     Chief Complaint   Patient presents with    Cough     cough for 6 months. Chest pain for 1 week that radiates to left arm    Chest Pain     Patient complains of chronic cough since last 6 months.  Patient says it is associated with mild sputum production which is white in color.  Cough happens mostly at night.  She seen by PCP who thinks it is postnasal drip.  Patient denies any fever.  Denies taking her hypertension medication for last 1 month.  No chest pain.  Complains of neck pain and left arm pain from coughing which started yesterday.  Denies any abdominal pain, nausea or vomiting.      The history is provided by the patient.     Review of patient's allergies indicates:  No Known Allergies  Past Medical History:   Diagnosis Date    Anxiety     Hypertension      History reviewed. No pertinent surgical history.  History reviewed. No pertinent family history.  Social History   Substance Use Topics    Smoking status: Never Smoker    Smokeless tobacco: Not on file    Alcohol use No     Review of Systems   Constitutional: Negative for activity change, appetite change and fever.   HENT: Negative for congestion, ear discharge, ear pain, rhinorrhea and sore throat.    Eyes: Negative for pain, discharge, redness and itching.   Respiratory: Negative for cough, shortness of breath and wheezing.    Cardiovascular: Negative for chest pain and palpitations.   Gastrointestinal: Negative for abdominal distention, abdominal pain, diarrhea, nausea and vomiting.   Genitourinary: Negative for dysuria, flank pain and frequency.   Musculoskeletal: Negative for back pain, gait problem, neck pain and neck stiffness.   Skin: Negative for rash.   Neurological: Negative for dizziness, light-headedness and headaches.   Psychiatric/Behavioral: Negative for confusion and hallucinations.   All other systems reviewed and are negative.      Physical Exam     Initial Vitals [12/20/17 2137]   BP  Pulse Resp Temp SpO2   135/87 110 20 97.9 °F (36.6 °C) 100 %      MAP       103         Physical Exam    Nursing note and vitals reviewed.  Constitutional: She appears well-developed and well-nourished.   HENT:   Head: Normocephalic.   Right Ear: External ear normal.   Left Ear: External ear normal.   Nose: Nose normal.   Mouth/Throat: Oropharynx is clear and moist.   Eyes: Conjunctivae and EOM are normal. Pupils are equal, round, and reactive to light.   Neck: Normal range of motion. Neck supple.   Cardiovascular: Normal rate, regular rhythm, normal heart sounds and intact distal pulses.   Pulmonary/Chest: Breath sounds normal. No respiratory distress. She has no wheezes. She has no rhonchi. She has no rales. She exhibits no tenderness.   Abdominal: Soft. Bowel sounds are normal. She exhibits no distension. There is no tenderness. There is no rebound and no guarding.   Musculoskeletal: Normal range of motion.   Neurological: She is alert and oriented to person, place, and time. She has normal strength and normal reflexes. She displays normal reflexes. No cranial nerve deficit or sensory deficit.   Skin: Skin is warm. Capillary refill takes less than 2 seconds. No rash noted.         ED Course   Procedures  Labs Reviewed   INFLUENZA A AND B ANTIGEN   PREGNANCY TEST, URINE RAPID          X-Rays:   Independently Interpreted Readings:   Chest X-Ray: Normal heart size.  No infiltrates.  No acute abnormalities.     Medical Decision Making:   Initial Assessment:   Patient presents to ER with chronic intermittent nocturnal cough which sometimes productive.  No fever.  Patient also has history of anxiety and has shortness of breath.  Currently patient is satting 100% on room air.  Without any respiratory distress.  Breathing is held voluntarily.  Bilateral good breath sounds expansion and no wheezing or crepitus or abrasions.  Patient will be observed on cardiac monitor and x-ray has been ordered.  Differential Diagnosis:    Chronic bronchitis, ALLERGIC rhinitis, pulmonary edema, pulmonary congestion, pneumonia, pleurisy.  Clinical Tests:   Radiological Study: Ordered and Reviewed  Medical Tests: Ordered and Reviewed  ED Management:  Patient had normal EKG and x-ray.  Observed on the monitor without any arrhythmia or decreased in oxygen saturation.  Patient symptoms resolved.  Itself during the stay in the ER.  Advised to follow-up with the primary care physician for further workup as needed.  May require pulmonology consult if symptoms persist.  Her symptoms today were slightly represent anxiety episode.                   ED Course      Clinical Impression:   The primary encounter diagnosis was Anxiety. Diagnoses of Chest pain, Cough, and Chronic cough were also pertinent to this visit.    Disposition:   Disposition: Discharged  Condition: Emily Dee MD  12/21/17 0209

## 2018-04-11 ENCOUNTER — OFFICE VISIT (OUTPATIENT)
Dept: OBSTETRICS AND GYNECOLOGY | Facility: CLINIC | Age: 23
End: 2018-04-11
Payer: COMMERCIAL

## 2018-04-11 VITALS
DIASTOLIC BLOOD PRESSURE: 104 MMHG | WEIGHT: 174 LBS | HEART RATE: 96 BPM | BODY MASS INDEX: 32.85 KG/M2 | SYSTOLIC BLOOD PRESSURE: 152 MMHG | HEIGHT: 61 IN

## 2018-04-11 DIAGNOSIS — E28.2 PCOS (POLYCYSTIC OVARIAN SYNDROME): ICD-10-CM

## 2018-04-11 DIAGNOSIS — Z12.4 PAP SMEAR FOR CERVICAL CANCER SCREENING: ICD-10-CM

## 2018-04-11 DIAGNOSIS — N91.4 SECONDARY OLIGOMENORRHEA: ICD-10-CM

## 2018-04-11 DIAGNOSIS — I10 ESSENTIAL HYPERTENSION: ICD-10-CM

## 2018-04-11 DIAGNOSIS — Z01.419 ENCOUNTER FOR ANNUAL ROUTINE GYNECOLOGICAL EXAMINATION: Primary | ICD-10-CM

## 2018-04-11 PROCEDURE — 99999 PR PBB SHADOW E&M-EST. PATIENT-LVL III: CPT | Mod: PBBFAC,,, | Performed by: OBSTETRICS & GYNECOLOGY

## 2018-04-11 PROCEDURE — 99385 PREV VISIT NEW AGE 18-39: CPT | Mod: S$GLB,,, | Performed by: OBSTETRICS & GYNECOLOGY

## 2018-04-11 PROCEDURE — 88175 CYTOPATH C/V AUTO FLUID REDO: CPT

## 2018-04-11 RX ORDER — METFORMIN HYDROCHLORIDE 500 MG/1
500 TABLET ORAL
Qty: 90 TABLET | Refills: 4 | Status: SHIPPED | OUTPATIENT
Start: 2018-04-11 | End: 2018-11-22

## 2018-04-11 NOTE — PROGRESS NOTES
No chief complaint on file.      HISTORY OF PRESENT ILLNESS:   Meghna Dozier is a 22 y.o. female  who presents for well woman exam.  Patient's last menstrual period was 2018..  She has complains of only having a cycle 3 times a year. This has been this way since she started her cycles at 11-12. Doesn't seem to change if she loses weight.  Has tried to do provera in the past with no success per patient. Has hotflashes at night, denies galactorrhea. Does have hair growth under chin but reports it has always been that way. She is not using protection for the past 2 years and hasn't conceived.  Declines STD testing.      Past Medical History:   Diagnosis Date    Anxiety     Hypertension         History reviewed. No pertinent surgical history.    Social History     Social History    Marital status: Single     Spouse name: N/A    Number of children: N/A    Years of education: N/A     Occupational History    Not on file.     Social History Main Topics    Smoking status: Never Smoker    Smokeless tobacco: Never Used    Alcohol use No    Drug use: No    Sexual activity: Yes     Partners: Male     Other Topics Concern    Not on file     Social History Narrative    No narrative on file       Family History   Problem Relation Age of Onset    Hypertension Father     Diabetes Father     Hypertension Mother        OB History    Para Term  AB Living   0 0 0 0 0 0   SAB TAB Ectopic Multiple Live Births   0 0 0 0 0               COMPREHENSIVE GYN HISTORY:  PAP History: Denies abnormal Paps  Infection History: Denies STDs. Denies PID.  Benign History:Denies uterine fibroids. Denies ovarian cysts. Denies endometriosis Denies other conditions.  Cancer History: Denies cervical cancer. Denies uterine cancer or hyperplasia. Denies ovarian cancer. Denies vulvar cancer or pre-cancer. Denies vaginal cancer or pre-cancer. Denies breast cancer. Denies colon cancer.  Cycle: 11-12/2-3 a year/5-7 days changes  "pad 4x/day and almost soaked, not painful     ROS:  GENERAL: Denies weight gain or weight loss. Feeling well overall.   SKIN: Denies rash or lesions.   HEAD: Denies headache.   NODES: Denies enlarged lymph nodes.   CHEST: Denies shortness of breath.   ABDOMEN: No abdominal pain, constipation, diarrhea, nausea, vomiting or rectal bleeding.   URINARY: No frequency, dysuria, hematuria, or burning on urination.  REPRODUCTIVE: See HPI.   BREASTS: The patient denies pain, lumps, or nipple discharge.       BP (!) 152/104   Pulse 96   Ht 5' 1" (1.549 m)   Wt 78.9 kg (174 lb)   LMP 02/22/2018   BMI 32.88 kg/m²     APPEARANCE: Well nourished, well developed, in no acute distress.  NECK: Neck symmetric without  Thyromegaly. acthososis nigricans + hair growth under chin   NODES: No inguinal, cervical lymph node enlargement.  CHEST: Lungs clear to auscultation.  HEART: Regular rate and rhythm, no murmurs, rubs or gallops.  ABDOMEN: Soft. No tenderness or masses. No hernias. No hepatosplenomegaly.  BREASTS: Symmetrical, no skin changes or visible lesions. No palpable masses, nipple discharge or adenopathy bilaterally.  PELVIC:   VULVA: No lesions. Normal female genitalia.  URETHRAL MEATUS: Normal size and location, no lesions, no prolapse.  URETHRA: No masses, tenderness, prolapse or scarring.  VAGINA: Moist and well rugated, no discharge, no significant cystocele or rectocele.  CERVIX: No lesions and discharge.  UTERUS: Normal size, regular shape, mobile, non-tender, bladder base nontender.  ADNEXA: No masses or tenderness.    UPT negative    1. Encounter for annual routine gynecological examination    2. Essential hypertension    3. PCOS (polycystic ovarian syndrome)    4. Pap smear for cervical cancer screening    5. Secondary oligomenorrhea        Plan:  Routine gyn s/p normal breast exam. Pap without HPV cotesting ordered . STD testing: GC/CT/trich, syphilis, HBV/HCV and HIV declined.   2. .Discussed anovulation and " that it is not normal to go months without a cycle if you are not on birth control. We discussed she meets the criteria for PCOS based on the male pattern hair growth and cycle abnormalities. Will start metformin and slowly increase to 3 times a day. Will get lab work and Us. Also gave information on semen analysis and HSG. We will see back in 3 months and she will work on weight loss in the meantime. If cycles haven't regulated can do provera and consider clomid. Recommend at least semen analysis before trial of clomid and if no success then would strongly recommend HSG.     F/u in 3 months.

## 2018-05-23 ENCOUNTER — PATIENT MESSAGE (OUTPATIENT)
Dept: OBSTETRICS AND GYNECOLOGY | Facility: CLINIC | Age: 23
End: 2018-05-23

## 2018-05-23 ENCOUNTER — TELEPHONE (OUTPATIENT)
Dept: OBSTETRICS AND GYNECOLOGY | Facility: CLINIC | Age: 23
End: 2018-05-23

## 2018-07-11 ENCOUNTER — OFFICE VISIT (OUTPATIENT)
Dept: OBSTETRICS AND GYNECOLOGY | Facility: CLINIC | Age: 23
End: 2018-07-11
Payer: COMMERCIAL

## 2018-07-11 VITALS
WEIGHT: 177 LBS | DIASTOLIC BLOOD PRESSURE: 67 MMHG | BODY MASS INDEX: 33.42 KG/M2 | HEIGHT: 61 IN | SYSTOLIC BLOOD PRESSURE: 105 MMHG

## 2018-07-11 DIAGNOSIS — E28.2 PCOS (POLYCYSTIC OVARIAN SYNDROME): Primary | ICD-10-CM

## 2018-07-11 PROCEDURE — 3078F DIAST BP <80 MM HG: CPT | Mod: CPTII,S$GLB,, | Performed by: OBSTETRICS & GYNECOLOGY

## 2018-07-11 PROCEDURE — 3074F SYST BP LT 130 MM HG: CPT | Mod: CPTII,S$GLB,, | Performed by: OBSTETRICS & GYNECOLOGY

## 2018-07-11 PROCEDURE — 3008F BODY MASS INDEX DOCD: CPT | Mod: CPTII,S$GLB,, | Performed by: OBSTETRICS & GYNECOLOGY

## 2018-07-11 PROCEDURE — 99999 PR PBB SHADOW E&M-EST. PATIENT-LVL III: CPT | Mod: PBBFAC,,, | Performed by: OBSTETRICS & GYNECOLOGY

## 2018-07-11 PROCEDURE — 99213 OFFICE O/P EST LOW 20 MIN: CPT | Mod: S$GLB,,, | Performed by: OBSTETRICS & GYNECOLOGY

## 2018-07-11 NOTE — PROGRESS NOTES
Chief Complaint   Patient presents with    Follow-up       HISTORY OF PRESENT ILLNESS:   Meghna Dozier is a 22 y.o. female  who presents for follow up for suspected PCOS.  Patient's last menstrual period was 2018..  She started the metformin and only took it once a day for a month. She noticed that she got her cycle on time those months when she was taking it. She did the ovulation predictor kits but no matter when she took it the kit said she was ovulating. She stopped taking the metformin due to GI side effects.      Past Medical History:   Diagnosis Date    Anxiety     Hypertension         History reviewed. No pertinent surgical history.      Social History     Social History    Marital status: Single     Spouse name: N/A    Number of children: N/A    Years of education: N/A     Occupational History    Not on file.     Social History Main Topics    Smoking status: Never Smoker    Smokeless tobacco: Never Used    Alcohol use No    Drug use: No    Sexual activity: Yes     Partners: Male     Other Topics Concern    Not on file     Social History Narrative    No narrative on file       Family History   Problem Relation Age of Onset    Hypertension Father     Diabetes Father     Hypertension Mother          OB History    Para Term  AB Living   0 0 0 0 0 0   SAB TAB Ectopic Multiple Live Births   0 0 0 0 0               COMPREHENSIVE GYN HISTORY:  PAP History: Denies abnormal Paps  Infection History: Denies STDs. Denies PID.  Benign History:Denies uterine fibroids. Denies ovarian cysts. Denies endometriosis Denies other conditions.  Cancer History: Denies cervical cancer. Denies uterine cancer or hyperplasia. Denies ovarian cancer. Denies vulvar cancer or pre-cancer. Denies vaginal cancer or pre-cancer. Denies breast cancer. Denies colon cancer.  Cycle: -12/2-3 a year/5-7 days changes pad 4x/day and almost soaked, not painful     ROS:  GENERAL: Denies weight gain or weight loss.  "Feeling well overall.   SKIN: Denies rash or lesions.   HEAD: Denies headache.   NODES: Denies enlarged lymph nodes.   CHEST: Denies shortness of breath.   ABDOMEN: No abdominal pain, constipation, diarrhea, nausea, vomiting or rectal bleeding.   URINARY: No frequency, dysuria, hematuria, or burning on urination.  REPRODUCTIVE: See HPI.   BREASTS: The patient denies pain, lumps, or nipple discharge.       /67   Ht 5' 1" (1.549 m)   Wt 80.3 kg (177 lb 0.5 oz)   LMP 05/22/2018   BMI 33.45 kg/m²     APPEARANCE: Well nourished, well developed, in no acute distress.  NECK: Neck symmetric without  Thyromegaly. acthososis nigricans + hair growth under chin     UPT negative    1. PCOS (polycystic ovarian syndrome)        Plan:  1. Spent 10 minutes discussing PCOS, lab work and treatment. Discussed anovulation and that it is not normal to go months without a cycle if you are not on birth control. We discussed the OPK can sometimes say your ovulating if you are not if your LH is elevated which it can be when you have PCOS. She gained 3lbs since last visit. We discussed she meets the criteria for PCOS based on the male pattern hair growth and cycle abnormalities. Encouraged to try metformin again and if can't tolerate we can try the extended release. Encouraged to do the lab work and Us that we had ordered before. Also gave information on semen analysis and HSG. She should continue to work on weight loss in the meantime. If cycles haven't regulated can do provera and consider clomid. Recommend at least semen analysis before trial of clomid and if no success then would strongly recommend HSG.           "

## 2018-08-08 ENCOUNTER — TELEPHONE (OUTPATIENT)
Dept: OBSTETRICS AND GYNECOLOGY | Facility: CLINIC | Age: 23
End: 2018-08-08

## 2018-08-08 NOTE — TELEPHONE ENCOUNTER
----- Message from Poly Easton MA sent at 8/7/2018  4:20 PM CDT -----  Contact: [phone]/ self      ----- Message -----  From: Yulia Flores  Sent: 8/7/2018   2:32 PM  To: Roberto Dillard Staff    Patient requesting to speak with you regarding scheduling ultrasound due to irregular cycle. Please advise.

## 2018-08-09 ENCOUNTER — TELEPHONE (OUTPATIENT)
Dept: OBSTETRICS AND GYNECOLOGY | Facility: CLINIC | Age: 23
End: 2018-08-09

## 2018-08-09 NOTE — TELEPHONE ENCOUNTER
----- Message from Poly Easton MA sent at 8/8/2018  5:00 PM CDT -----  Contact: self/[phone]      ----- Message -----  From: Leonardo Story  Sent: 8/8/2018   9:20 AM  To: Roberto Dillard Staff    She is returning the nurse's call,

## 2018-08-17 ENCOUNTER — OFFICE VISIT (OUTPATIENT)
Dept: OBSTETRICS AND GYNECOLOGY | Facility: CLINIC | Age: 23
End: 2018-08-17
Payer: COMMERCIAL

## 2018-08-17 ENCOUNTER — LAB VISIT (OUTPATIENT)
Dept: LAB | Facility: HOSPITAL | Age: 23
End: 2018-08-17
Attending: OBSTETRICS & GYNECOLOGY
Payer: COMMERCIAL

## 2018-08-17 ENCOUNTER — PROCEDURE VISIT (OUTPATIENT)
Dept: OBSTETRICS AND GYNECOLOGY | Facility: CLINIC | Age: 23
End: 2018-08-17
Payer: COMMERCIAL

## 2018-08-17 VITALS
BODY MASS INDEX: 32.84 KG/M2 | SYSTOLIC BLOOD PRESSURE: 130 MMHG | DIASTOLIC BLOOD PRESSURE: 90 MMHG | WEIGHT: 173.94 LBS | HEIGHT: 61 IN

## 2018-08-17 DIAGNOSIS — N91.4 SECONDARY OLIGOMENORRHEA: ICD-10-CM

## 2018-08-17 DIAGNOSIS — E28.2 PCOS (POLYCYSTIC OVARIAN SYNDROME): Primary | ICD-10-CM

## 2018-08-17 DIAGNOSIS — E28.2 PCOS (POLYCYSTIC OVARIAN SYNDROME): ICD-10-CM

## 2018-08-17 DIAGNOSIS — Z01.419 ENCOUNTER FOR ANNUAL ROUTINE GYNECOLOGICAL EXAMINATION: ICD-10-CM

## 2018-08-17 LAB
BASOPHILS # BLD AUTO: 0.04 K/UL
BASOPHILS NFR BLD: 0.6 %
CHOLEST SERPL-MCNC: 192 MG/DL
CHOLEST/HDLC SERPL: 3.6 {RATIO}
DIFFERENTIAL METHOD: ABNORMAL
EOSINOPHIL # BLD AUTO: 0.1 K/UL
EOSINOPHIL NFR BLD: 1 %
ERYTHROCYTE [DISTWIDTH] IN BLOOD BY AUTOMATED COUNT: 14.9 %
FSH SERPL-ACNC: 7.1 MIU/ML
HCT VFR BLD AUTO: 38.1 %
HDLC SERPL-MCNC: 53 MG/DL
HDLC SERPL: 27.6 %
HGB BLD-MCNC: 11.9 G/DL
LDLC SERPL CALC-MCNC: 123.4 MG/DL
LH SERPL-ACNC: 14.5 MIU/ML
LYMPHOCYTES # BLD AUTO: 2.2 K/UL
LYMPHOCYTES NFR BLD: 31.2 %
MCH RBC QN AUTO: 25.1 PG
MCHC RBC AUTO-ENTMCNC: 31.2 G/DL
MCV RBC AUTO: 80 FL
MONOCYTES # BLD AUTO: 0.7 K/UL
MONOCYTES NFR BLD: 9.4 %
NEUTROPHILS # BLD AUTO: 4.1 K/UL
NEUTROPHILS NFR BLD: 57.5 %
NONHDLC SERPL-MCNC: 139 MG/DL
PLATELET # BLD AUTO: 339 K/UL
PMV BLD AUTO: 11.2 FL
PROLACTIN SERPL IA-MCNC: 8.1 NG/ML
RBC # BLD AUTO: 4.74 M/UL
TRIGL SERPL-MCNC: 78 MG/DL
TSH SERPL DL<=0.005 MIU/L-ACNC: 0.85 UIU/ML
WBC # BLD AUTO: 7.11 K/UL

## 2018-08-17 PROCEDURE — 3075F SYST BP GE 130 - 139MM HG: CPT | Mod: CPTII,S$GLB,, | Performed by: OBSTETRICS & GYNECOLOGY

## 2018-08-17 PROCEDURE — 83525 ASSAY OF INSULIN: CPT

## 2018-08-17 PROCEDURE — 99212 OFFICE O/P EST SF 10 MIN: CPT | Mod: 25,S$GLB,, | Performed by: OBSTETRICS & GYNECOLOGY

## 2018-08-17 PROCEDURE — 80061 LIPID PANEL: CPT

## 2018-08-17 PROCEDURE — 84146 ASSAY OF PROLACTIN: CPT

## 2018-08-17 PROCEDURE — 99999 PR PBB SHADOW E&M-EST. PATIENT-LVL III: CPT | Mod: PBBFAC,,, | Performed by: OBSTETRICS & GYNECOLOGY

## 2018-08-17 PROCEDURE — 83001 ASSAY OF GONADOTROPIN (FSH): CPT

## 2018-08-17 PROCEDURE — 85025 COMPLETE CBC W/AUTO DIFF WBC: CPT

## 2018-08-17 PROCEDURE — 83002 ASSAY OF GONADOTROPIN (LH): CPT

## 2018-08-17 PROCEDURE — 76830 TRANSVAGINAL US NON-OB: CPT | Mod: S$GLB,,, | Performed by: OBSTETRICS & GYNECOLOGY

## 2018-08-17 PROCEDURE — 84443 ASSAY THYROID STIM HORMONE: CPT

## 2018-08-17 PROCEDURE — 36415 COLL VENOUS BLD VENIPUNCTURE: CPT

## 2018-08-17 PROCEDURE — 3080F DIAST BP >= 90 MM HG: CPT | Mod: CPTII,S$GLB,, | Performed by: OBSTETRICS & GYNECOLOGY

## 2018-08-17 PROCEDURE — 3008F BODY MASS INDEX DOCD: CPT | Mod: CPTII,S$GLB,, | Performed by: OBSTETRICS & GYNECOLOGY

## 2018-08-17 RX ORDER — MEDROXYPROGESTERONE ACETATE 10 MG/1
10 TABLET ORAL DAILY
Qty: 30 TABLET | Refills: 3 | Status: SHIPPED | OUTPATIENT
Start: 2018-08-17 | End: 2020-05-20

## 2018-08-17 NOTE — PROGRESS NOTES
Chief Complaint   Patient presents with    Results       HISTORY OF PRESENT ILLNESS:   Meghna Dozier is a 22 y.o. female  who presents for follow up for  PCOS and US.  Patient's last menstrual period was 2018..  She started the metformin and only took it once a day for a month. She reports still doing it once a day. Periods still irregular.      Past Medical History:   Diagnosis Date    Anxiety     Hypertension         History reviewed. No pertinent surgical history.      Social History     Socioeconomic History    Marital status: Single     Spouse name: Not on file    Number of children: Not on file    Years of education: Not on file    Highest education level: Not on file   Social Needs    Financial resource strain: Not on file    Food insecurity - worry: Not on file    Food insecurity - inability: Not on file    Transportation needs - medical: Not on file    Transportation needs - non-medical: Not on file   Occupational History    Not on file   Tobacco Use    Smoking status: Never Smoker    Smokeless tobacco: Never Used   Substance and Sexual Activity    Alcohol use: No    Drug use: No    Sexual activity: Yes     Partners: Male   Other Topics Concern    Not on file   Social History Narrative    Not on file       Family History   Problem Relation Age of Onset    Hypertension Father     Diabetes Father     Hypertension Mother          OB History    Para Term  AB Living   0 0 0 0 0 0   SAB TAB Ectopic Multiple Live Births   0 0 0 0 0               COMPREHENSIVE GYN HISTORY:  PAP History: Denies abnormal Paps  Infection History: Denies STDs. Denies PID.  Benign History:Denies uterine fibroids. Denies ovarian cysts. Denies endometriosis Denies other conditions.  Cancer History: Denies cervical cancer. Denies uterine cancer or hyperplasia. Denies ovarian cancer. Denies vulvar cancer or pre-cancer. Denies vaginal cancer or pre-cancer. Denies breast cancer. Denies colon  "cancer.  Cycle: 11-12/2-3 a year/5-7 days changes pad 4x/day and almost soaked, not painful     ROS:  GENERAL: Denies weight gain or weight loss. Feeling well overall.   SKIN: Denies rash or lesions.   HEAD: Denies headache.   NODES: Denies enlarged lymph nodes.   CHEST: Denies shortness of breath.   ABDOMEN: No abdominal pain, constipation, diarrhea, nausea, vomiting or rectal bleeding.   URINARY: No frequency, dysuria, hematuria, or burning on urination.  REPRODUCTIVE: See HPI.   BREASTS: The patient denies pain, lumps, or nipple discharge.       BP (!) 130/90   Ht 5' 1" (1.549 m)   Wt 78.9 kg (173 lb 15.1 oz)   LMP 05/27/2018   BMI 32.87 kg/m²     APPEARANCE: Well nourished, well developed, in no acute distress.  NECK: Neck symmetric without  Thyromegaly. acthososis nigricans + hair growth under chin     UPT negative  TVUS: uterus normal sized anteverted, bilateral ovaries polycystic in appearance and slightly enlarged    1. PCOS (polycystic ovarian syndrome)        Plan:  1. Spent 10 minutes discussing PCOS, lab work and treatment. Discussed anovulation and that it is not normal to go months without a cycle if you are not on birth control. We discussed the OPK can sometimes say your ovulating if you are not if your LH is elevated which it can be when you have PCOS. She gained 3lbs since last visit. We discussed she meets the criteria for PCOS based on the male pattern hair growth and cycle abnormalities. Encouraged to try metformin again and if can't tolerate we can try the extended release. Will do provera for 2-3 months then see back and consider clomid vs letrazole.   Also gave information on semen analysis and HSG. She should continue to work on weight loss in the meantime. If cycles haven't regulated can do provera and consider clomid. Recommend at least semen analysis  and if no success then would strongly recommend HSG.           "

## 2018-08-21 LAB
INSULIN COLLECTION INTERVAL: NORMAL
INSULIN SERPL-ACNC: 7.5 UU/ML

## 2018-11-22 ENCOUNTER — HOSPITAL ENCOUNTER (EMERGENCY)
Facility: HOSPITAL | Age: 23
Discharge: HOME | End: 2018-11-22
Attending: FAMILY MEDICINE
Payer: COMMERCIAL

## 2018-11-22 VITALS
DIASTOLIC BLOOD PRESSURE: 84 MMHG | SYSTOLIC BLOOD PRESSURE: 143 MMHG | OXYGEN SATURATION: 100 % | BODY MASS INDEX: 29.53 KG/M2 | WEIGHT: 173 LBS | TEMPERATURE: 98 F | HEIGHT: 64 IN | HEART RATE: 103 BPM | RESPIRATION RATE: 20 BRPM

## 2018-11-22 DIAGNOSIS — S05.8X2A: Primary | ICD-10-CM

## 2018-11-22 DIAGNOSIS — H10.32 ACUTE BACTERIAL CONJUNCTIVITIS OF LEFT EYE: ICD-10-CM

## 2018-11-22 PROCEDURE — 99284 EMERGENCY DEPT VISIT MOD MDM: CPT

## 2018-11-22 PROCEDURE — 25000003 PHARM REV CODE 250: Performed by: FAMILY MEDICINE

## 2018-11-22 RX ORDER — AMLODIPINE BESYLATE 10 MG/1
10 TABLET ORAL DAILY
COMMUNITY
End: 2021-07-20

## 2018-11-22 RX ORDER — IBUPROFEN 400 MG/1
800 TABLET ORAL
Status: COMPLETED | OUTPATIENT
Start: 2018-11-22 | End: 2018-11-22

## 2018-11-22 RX ORDER — MOXIFLOXACIN 5 MG/ML
1 SOLUTION/ DROPS OPHTHALMIC 3 TIMES DAILY
Qty: 3 ML | Refills: 0 | Status: SHIPPED | OUTPATIENT
Start: 2018-11-22 | End: 2018-11-22 | Stop reason: SDUPTHER

## 2018-11-22 RX ORDER — MOXIFLOXACIN 5 MG/ML
1 SOLUTION/ DROPS OPHTHALMIC 3 TIMES DAILY
Qty: 3 ML | Refills: 0 | Status: SHIPPED | OUTPATIENT
Start: 2018-11-22 | End: 2021-06-29

## 2018-11-22 RX ORDER — PROPARACAINE HYDROCHLORIDE 5 MG/ML
1 SOLUTION/ DROPS OPHTHALMIC
Status: COMPLETED | OUTPATIENT
Start: 2018-11-22 | End: 2018-11-22

## 2018-11-22 RX ADMIN — FLUORESCEIN SODIUM 1 EACH: 1 STRIP OPHTHALMIC at 03:11

## 2018-11-22 RX ADMIN — IBUPROFEN 800 MG: 400 TABLET, FILM COATED ORAL at 03:11

## 2018-11-22 RX ADMIN — PROPARACAINE HYDROCHLORIDE 1 DROP: 5 SOLUTION/ DROPS OPHTHALMIC at 03:11

## 2018-11-22 NOTE — ED PROVIDER NOTES
Encounter Date: 11/22/2018       History     Chief Complaint   Patient presents with    Eye Pain     Left eye redness/draining/swollen/burning pain since yesterday, normally wears contacts, changes lense q 3 weeks, denies any visual changes     A 3-year-old female presents with chief complaint of left eye pain. Patient reports left eye redness draining and pain which started this evening.  Patient reports has not changed her contacts in about 3 weeks ago with started noticing the pain and discomfort to come out.  Reports her contacts can be worn up to 1 month a time.  Patient denies having similar complaints in the past.          Review of patient's allergies indicates:  No Known Allergies  Past Medical History:   Diagnosis Date    Anxiety     Hypertension      History reviewed. No pertinent surgical history.  Family History   Problem Relation Age of Onset    Hypertension Father     Diabetes Father     Hypertension Mother      Social History     Tobacco Use    Smoking status: Never Smoker    Smokeless tobacco: Never Used   Substance Use Topics    Alcohol use: No    Drug use: No     Review of Systems   Constitutional: Negative for fever.   HENT: Negative for ear discharge.    Eyes: Positive for pain and discharge.   Respiratory: Negative for cough.    Cardiovascular: Negative for chest pain.   All other systems reviewed and are negative.      Physical Exam     Initial Vitals [11/22/18 0314]   BP Pulse Resp Temp SpO2   (!) 143/84 103 20 98.1 °F (36.7 °C) 100 %      MAP       --         Physical Exam    Nursing note and vitals reviewed.  Constitutional: She appears well-developed and well-nourished.   HENT:   Head: Normocephalic and atraumatic.   Eyes: EOM are normal. Pupils are equal, round, and reactive to light. Right eye exhibits no exudate. Left eye exhibits chemosis and discharge. Right conjunctiva is injected. Right conjunctiva has no hemorrhage. Left conjunctiva is not injected. Left conjunctiva has  no hemorrhage.   Slit lamp exam:       The left eye shows corneal abrasion and fluorescein uptake. The left eye shows no corneal flare.   Neck: Normal range of motion. Neck supple.   Cardiovascular: Normal rate, regular rhythm and normal heart sounds.   Pulmonary/Chest: Breath sounds normal.   Abdominal: Soft.   Musculoskeletal: Normal range of motion.   Neurological: She is alert and oriented to person, place, and time. She has normal strength. GCS score is 15. GCS eye subscore is 4. GCS verbal subscore is 5. GCS motor subscore is 6.   Psychiatric: She has a normal mood and affect. Her behavior is normal.         ED Course   Procedures  Labs Reviewed - No data to display       Imaging Results    None                               Clinical Impression:   The primary encounter diagnosis was Corneal injury due to contact lens, left. A diagnosis of Acute bacterial conjunctivitis of left eye was also pertinent to this visit.                             Kendall Weiner MD  11/22/18 [id number]

## 2019-01-09 ENCOUNTER — HOSPITAL ENCOUNTER (EMERGENCY)
Facility: HOSPITAL | Age: 24
Discharge: HOME | End: 2019-01-09
Attending: EMERGENCY MEDICINE
Payer: COMMERCIAL

## 2019-01-09 VITALS
RESPIRATION RATE: 18 BRPM | BODY MASS INDEX: 50.02 KG/M2 | TEMPERATURE: 98 F | HEIGHT: 64 IN | DIASTOLIC BLOOD PRESSURE: 89 MMHG | OXYGEN SATURATION: 100 % | WEIGHT: 293 LBS | HEART RATE: 90 BPM | SYSTOLIC BLOOD PRESSURE: 140 MMHG

## 2019-01-09 DIAGNOSIS — N92.0 MENORRHAGIA WITH REGULAR CYCLE: Primary | ICD-10-CM

## 2019-01-09 LAB
ALBUMIN SERPL BCP-MCNC: 4.2 G/DL
ALP SERPL-CCNC: 72 U/L
ALT SERPL W/O P-5'-P-CCNC: 11 U/L
ANION GAP SERPL CALC-SCNC: 9 MMOL/L
AST SERPL-CCNC: 15 U/L
B-HCG UR QL: NEGATIVE
BACTERIA #/AREA URNS AUTO: ABNORMAL /HPF
BASOPHILS # BLD AUTO: 0.05 K/UL
BASOPHILS NFR BLD: 0.7 %
BILIRUB SERPL-MCNC: 0.3 MG/DL
BILIRUB UR QL STRIP: NEGATIVE
BUN SERPL-MCNC: 8 MG/DL
CALCIUM SERPL-MCNC: 8.6 MG/DL
CHLORIDE SERPL-SCNC: 104 MMOL/L
CLARITY UR REFRACT.AUTO: ABNORMAL
CO2 SERPL-SCNC: 24 MMOL/L
COLOR UR AUTO: ABNORMAL
CREAT SERPL-MCNC: 0.65 MG/DL
DIFFERENTIAL METHOD: ABNORMAL
EOSINOPHIL # BLD AUTO: 0.1 K/UL
EOSINOPHIL NFR BLD: 2 %
ERYTHROCYTE [DISTWIDTH] IN BLOOD BY AUTOMATED COUNT: 15.1 %
EST. GFR  (AFRICAN AMERICAN): >60 ML/MIN/1.73 M^2
EST. GFR  (NON AFRICAN AMERICAN): >60 ML/MIN/1.73 M^2
GLUCOSE SERPL-MCNC: 81 MG/DL
GLUCOSE UR QL STRIP: NEGATIVE
HCT VFR BLD AUTO: 31.4 %
HGB BLD-MCNC: 9.7 G/DL
HGB UR QL STRIP: ABNORMAL
HYALINE CASTS UR QL AUTO: 0 /LPF
KETONES UR QL STRIP: NEGATIVE
LEUKOCYTE ESTERASE UR QL STRIP: ABNORMAL
LYMPHOCYTES # BLD AUTO: 2.4 K/UL
LYMPHOCYTES NFR BLD: 33.9 %
MCH RBC QN AUTO: 24.1 PG
MCHC RBC AUTO-ENTMCNC: 30.9 G/DL
MCV RBC AUTO: 78 FL
MICROSCOPIC COMMENT: ABNORMAL
MONOCYTES # BLD AUTO: 0.8 K/UL
MONOCYTES NFR BLD: 11.8 %
NEUTROPHILS # BLD AUTO: 3.6 K/UL
NEUTROPHILS NFR BLD: 51.5 %
NITRITE UR QL STRIP: NEGATIVE
PH UR STRIP: 7 [PH] (ref 5–8)
PLATELET # BLD AUTO: 421 K/UL
PMV BLD AUTO: 10.4 FL
POTASSIUM SERPL-SCNC: 4 MMOL/L
PROT SERPL-MCNC: 7.9 G/DL
PROT UR QL STRIP: ABNORMAL
RBC # BLD AUTO: 4.03 M/UL
RBC #/AREA URNS AUTO: >100 /HPF (ref 0–4)
SODIUM SERPL-SCNC: 137 MMOL/L
SP GR UR STRIP: 1.01 (ref 1–1.03)
URN SPEC COLLECT METH UR: ABNORMAL
UROBILINOGEN UR STRIP-ACNC: NEGATIVE EU/DL
WBC # BLD AUTO: 7.04 K/UL
WBC #/AREA URNS AUTO: 2 /HPF (ref 0–5)

## 2019-01-09 PROCEDURE — 85025 COMPLETE CBC W/AUTO DIFF WBC: CPT | Mod: ER

## 2019-01-09 PROCEDURE — 99283 EMERGENCY DEPT VISIT LOW MDM: CPT | Mod: ER

## 2019-01-09 PROCEDURE — 81025 URINE PREGNANCY TEST: CPT | Mod: ER

## 2019-01-09 PROCEDURE — 81000 URINALYSIS NONAUTO W/SCOPE: CPT | Mod: ER

## 2019-01-09 PROCEDURE — 80053 COMPREHEN METABOLIC PANEL: CPT | Mod: ER

## 2019-01-09 RX ORDER — FERROUS SULFATE 325(65) MG
325 TABLET ORAL DAILY
Qty: 30 TABLET | Refills: 0 | COMMUNITY
Start: 2019-01-09 | End: 2021-06-29

## 2019-01-09 NOTE — ED PROVIDER NOTES
"Encounter Date: 1/9/2019       History     Chief Complaint   Patient presents with    Vaginal Bleeding     "I started bleeding vaginally yesterday and I have clots" LMP 12-8-18     23-year-old female comes emergency complaints of vaginal bleeding getting worse over the last 2 days.  Patient states that she has large blood clots over the last 2 days.  Extreme abdominal pain. Patient's last menstrual period was 12/7-12/17.  Patient states she has very irregular menses.  Does not know she is pregnant or not.  Typically healthy otherwise.  Takes no medications on a regular basis.          Review of patient's allergies indicates:  No Known Allergies  Past Medical History:   Diagnosis Date    Anxiety     Hypertension      No past surgical history on file.  Family History   Problem Relation Age of Onset    Hypertension Father     Diabetes Father     Hypertension Mother      Social History     Tobacco Use    Smoking status: Never Smoker    Smokeless tobacco: Never Used   Substance Use Topics    Alcohol use: No    Drug use: No     Review of Systems   Constitutional: Negative.    Respiratory: Negative.    Gastrointestinal: Negative.    Musculoskeletal: Negative.    All other systems reviewed and are negative.      Physical Exam     Initial Vitals [01/09/19 0941]   BP Pulse Resp Temp SpO2   (!) 141/86 88 17 98.1 °F (36.7 °C) 100 %      MAP       --         Physical Exam    Nursing note and vitals reviewed.  Constitutional: She appears well-developed and well-nourished.   HENT:   Head: Normocephalic.   Eyes: Pupils are equal, round, and reactive to light.   Neck: Normal range of motion.   Cardiovascular: Normal rate, regular rhythm and normal heart sounds.   Pulmonary/Chest: Breath sounds normal.   Abdominal: Soft.   Musculoskeletal: Normal range of motion.   Neurological: She is alert and oriented to person, place, and time. She has normal strength.   Skin: Skin is warm and dry. Capillary refill takes less than 2 " seconds.   Psychiatric: She has a normal mood and affect. Thought content normal.         ED Course   Procedures  Labs Reviewed   CBC W/ AUTO DIFFERENTIAL - Abnormal; Notable for the following components:       Result Value    Hemoglobin 9.7 (*)     Hematocrit 31.4 (*)     MCV 78 (*)     MCH 24.1 (*)     MCHC 30.9 (*)     RDW 15.1 (*)     Platelets 421 (*)     All other components within normal limits   COMPREHENSIVE METABOLIC PANEL - Abnormal; Notable for the following components:    Calcium 8.6 (*)     All other components within normal limits   URINALYSIS, REFLEX TO URINE CULTURE - Abnormal; Notable for the following components:    Color, UA Other (*)     Appearance, UA Hazy (*)     Protein, UA 1+ (*)     Occult Blood UA 3+ (*)     Leukocytes, UA 1+ (*)     All other components within normal limits    Narrative:     Preferred Collection Type->Urine, Clean Catch   URINALYSIS MICROSCOPIC - Abnormal; Notable for the following components:    RBC, UA >100 (*)     All other components within normal limits    Narrative:     Preferred Collection Type->Urine, Clean Catch   PREGNANCY TEST, URINE RAPID          Imaging Results    None          Medical Decision Making:   Differential Diagnosis:   Threatened miscarriage, uterine cancer, menstrual cycle, vaginal trauma, fibroids, urinary tract infection, rectal bleeding, DUB, STD.    ED Management:  Patient is stable at this time.  Negative pregnancy test.  Laboratory values a good mild anemia.  I discussed with the patient that she needs taken on supplemental regular basis.  This is to her heavy menstruation.  She also needs to follow up in OBGYN to have possible hormonal therapy to help regulate her menstrual cycles.  She understands and will do this.                      Clinical Impression:   The encounter diagnosis was Menorrhagia with regular cycle.      Disposition:   Disposition: Discharged  Condition: Stable                        Dayne Montesinos MD  01/09/19  [id number]

## 2019-09-13 ENCOUNTER — HOSPITAL ENCOUNTER (INPATIENT)
Facility: HOSPITAL | Age: 24
LOS: 5 days | Discharge: HOME | DRG: 872 | End: 2019-09-18
Attending: EMERGENCY MEDICINE | Admitting: FAMILY MEDICINE
Payer: MEDICAID

## 2019-09-13 DIAGNOSIS — N12 PYELONEPHRITIS: ICD-10-CM

## 2019-09-13 DIAGNOSIS — A41.9 SEPSIS, DUE TO UNSPECIFIED ORGANISM: Primary | ICD-10-CM

## 2019-09-13 LAB
ALBUMIN SERPL BCP-MCNC: 4.3 G/DL (ref 3.5–5.2)
ALP SERPL-CCNC: 100 U/L (ref 38–126)
ALT SERPL W/O P-5'-P-CCNC: 16 U/L (ref 10–44)
ANION GAP SERPL CALC-SCNC: 10 MMOL/L (ref 8–16)
ANISOCYTOSIS BLD QL SMEAR: SLIGHT
AST SERPL-CCNC: 21 U/L (ref 15–46)
B-HCG UR QL: NEGATIVE
B-OH-BUTYR BLD STRIP-SCNC: 0.2 MMOL/L (ref 0–0.5)
BACTERIA #/AREA URNS AUTO: ABNORMAL /HPF
BASOPHILS # BLD AUTO: 0.1 K/UL (ref 0–0.2)
BASOPHILS NFR BLD: 0.4 % (ref 0–1.9)
BILIRUB SERPL-MCNC: 1.1 MG/DL (ref 0.1–1)
BILIRUB UR QL STRIP: NEGATIVE
BUN SERPL-MCNC: 13 MG/DL (ref 7–17)
CALCIUM SERPL-MCNC: 9.5 MG/DL (ref 8.7–10.5)
CHLORIDE SERPL-SCNC: 98 MMOL/L (ref 95–110)
CLARITY UR REFRACT.AUTO: CLEAR
CO2 SERPL-SCNC: 26 MMOL/L (ref 23–29)
COLOR UR AUTO: ABNORMAL
CREAT SERPL-MCNC: 1.25 MG/DL (ref 0.5–1.4)
DIFFERENTIAL METHOD: ABNORMAL
EOSINOPHIL # BLD AUTO: 0 K/UL (ref 0–0.5)
EOSINOPHIL NFR BLD: 0 % (ref 0–8)
ERYTHROCYTE [DISTWIDTH] IN BLOOD BY AUTOMATED COUNT: 19.6 % (ref 11.5–14.5)
EST. GFR  (AFRICAN AMERICAN): >60 ML/MIN/1.73 M^2
EST. GFR  (NON AFRICAN AMERICAN): 59.9 ML/MIN/1.73 M^2
GLUCOSE SERPL-MCNC: 104 MG/DL (ref 70–110)
GLUCOSE UR QL STRIP: NEGATIVE
HCT VFR BLD AUTO: 33.1 % (ref 37–48.5)
HGB BLD-MCNC: 10.2 G/DL (ref 12–16)
HGB UR QL STRIP: ABNORMAL
HYPOCHROMIA BLD QL SMEAR: ABNORMAL
KETONES UR QL STRIP: NEGATIVE
LACTATE SERPL-SCNC: 1.1 MMOL/L (ref 0.5–2.2)
LACTATE SERPL-SCNC: 2.1 MMOL/L (ref 0.5–2.2)
LEUKOCYTE ESTERASE UR QL STRIP: NEGATIVE
LIPASE SERPL-CCNC: 74 U/L (ref 23–300)
LYMPHOCYTES # BLD AUTO: 1.7 K/UL (ref 1–4.8)
LYMPHOCYTES NFR BLD: 7.7 % (ref 18–48)
MCH RBC QN AUTO: 20.1 PG (ref 27–31)
MCHC RBC AUTO-ENTMCNC: 30.8 G/DL (ref 32–36)
MCV RBC AUTO: 65 FL (ref 82–98)
MICROSCOPIC COMMENT: ABNORMAL
MONOCYTES # BLD AUTO: 2.3 K/UL (ref 0.3–1)
MONOCYTES NFR BLD: 10.3 % (ref 4–15)
NEUTROPHILS # BLD AUTO: 18.2 K/UL (ref 1.8–7.7)
NEUTROPHILS NFR BLD: 81.6 % (ref 38–73)
NITRITE UR QL STRIP: NEGATIVE
PH UR STRIP: 5 [PH] (ref 5–8)
PLATELET # BLD AUTO: 369 K/UL (ref 150–350)
PMV BLD AUTO: 10.6 FL (ref 9.2–12.9)
POCT GLUCOSE: 74 MG/DL (ref 70–110)
POCT GLUCOSE: 92 MG/DL (ref 70–110)
POIKILOCYTOSIS BLD QL SMEAR: SLIGHT
POTASSIUM SERPL-SCNC: 3.4 MMOL/L (ref 3.5–5.1)
PROT SERPL-MCNC: 8.7 G/DL (ref 6–8.4)
PROT UR QL STRIP: NEGATIVE
RBC # BLD AUTO: 5.07 M/UL (ref 4–5.4)
RBC #/AREA URNS AUTO: 1 /HPF (ref 0–4)
SODIUM SERPL-SCNC: 134 MMOL/L (ref 136–145)
SP GR UR STRIP: 1.01 (ref 1–1.03)
URN SPEC COLLECT METH UR: ABNORMAL
UROBILINOGEN UR STRIP-ACNC: NEGATIVE EU/DL
WBC # BLD AUTO: 22.47 K/UL (ref 3.9–12.7)
WBC #/AREA URNS AUTO: 1 /HPF (ref 0–5)

## 2019-09-13 PROCEDURE — G0378 HOSPITAL OBSERVATION PER HR: HCPCS

## 2019-09-13 PROCEDURE — 12000002 HC ACUTE/MED SURGE SEMI-PRIVATE ROOM

## 2019-09-13 PROCEDURE — 99285 EMERGENCY DEPT VISIT HI MDM: CPT | Mod: 25,ER

## 2019-09-13 PROCEDURE — 63600175 PHARM REV CODE 636 W HCPCS: Performed by: FAMILY MEDICINE

## 2019-09-13 PROCEDURE — 25000003 PHARM REV CODE 250: Performed by: FAMILY MEDICINE

## 2019-09-13 PROCEDURE — 82962 GLUCOSE BLOOD TEST: CPT | Mod: ER

## 2019-09-13 PROCEDURE — 81025 URINE PREGNANCY TEST: CPT | Mod: ER

## 2019-09-13 PROCEDURE — 82010 KETONE BODYS QUAN: CPT | Mod: ER

## 2019-09-13 PROCEDURE — 80053 COMPREHEN METABOLIC PANEL: CPT | Mod: ER

## 2019-09-13 PROCEDURE — 87086 URINE CULTURE/COLONY COUNT: CPT | Mod: ER

## 2019-09-13 PROCEDURE — 25500020 PHARM REV CODE 255: Mod: ER | Performed by: EMERGENCY MEDICINE

## 2019-09-13 PROCEDURE — 83605 ASSAY OF LACTIC ACID: CPT | Mod: ER

## 2019-09-13 PROCEDURE — 87040 BLOOD CULTURE FOR BACTERIA: CPT | Mod: 59,ER

## 2019-09-13 PROCEDURE — 96361 HYDRATE IV INFUSION ADD-ON: CPT | Performed by: EMERGENCY MEDICINE

## 2019-09-13 PROCEDURE — 96365 THER/PROPH/DIAG IV INF INIT: CPT | Mod: ER

## 2019-09-13 PROCEDURE — 83690 ASSAY OF LIPASE: CPT | Mod: ER

## 2019-09-13 PROCEDURE — 25000003 PHARM REV CODE 250: Mod: ER | Performed by: PHYSICIAN ASSISTANT

## 2019-09-13 PROCEDURE — 96372 THER/PROPH/DIAG INJ SC/IM: CPT | Mod: 59 | Performed by: EMERGENCY MEDICINE

## 2019-09-13 PROCEDURE — 85025 COMPLETE CBC W/AUTO DIFF WBC: CPT | Mod: ER

## 2019-09-13 PROCEDURE — 96361 HYDRATE IV INFUSION ADD-ON: CPT | Mod: ER

## 2019-09-13 PROCEDURE — 81000 URINALYSIS NONAUTO W/SCOPE: CPT | Mod: ER

## 2019-09-13 PROCEDURE — 63600175 PHARM REV CODE 636 W HCPCS: Mod: ER | Performed by: PHYSICIAN ASSISTANT

## 2019-09-13 RX ORDER — SODIUM CHLORIDE 0.9 % (FLUSH) 0.9 %
10 SYRINGE (ML) INJECTION
Status: DISCONTINUED | OUTPATIENT
Start: 2019-09-13 | End: 2019-09-18 | Stop reason: HOSPADM

## 2019-09-13 RX ORDER — GLUCAGON 1 MG
1 KIT INJECTION
Status: DISCONTINUED | OUTPATIENT
Start: 2019-09-13 | End: 2019-09-18 | Stop reason: HOSPADM

## 2019-09-13 RX ORDER — IBUPROFEN 200 MG
24 TABLET ORAL
Status: DISCONTINUED | OUTPATIENT
Start: 2019-09-13 | End: 2019-09-18 | Stop reason: HOSPADM

## 2019-09-13 RX ORDER — ACETAMINOPHEN 500 MG
1000 TABLET ORAL
Status: COMPLETED | OUTPATIENT
Start: 2019-09-13 | End: 2019-09-13

## 2019-09-13 RX ORDER — ONDANSETRON 8 MG/1
8 TABLET, ORALLY DISINTEGRATING ORAL EVERY 8 HOURS PRN
Status: DISCONTINUED | OUTPATIENT
Start: 2019-09-13 | End: 2019-09-18 | Stop reason: HOSPADM

## 2019-09-13 RX ORDER — POTASSIUM CHLORIDE 20 MEQ/15ML
40 SOLUTION ORAL
Status: DISCONTINUED | OUTPATIENT
Start: 2019-09-13 | End: 2019-09-18 | Stop reason: HOSPADM

## 2019-09-13 RX ORDER — ENOXAPARIN SODIUM 100 MG/ML
40 INJECTION SUBCUTANEOUS EVERY 24 HOURS
Status: DISCONTINUED | OUTPATIENT
Start: 2019-09-13 | End: 2019-09-18 | Stop reason: HOSPADM

## 2019-09-13 RX ORDER — IBUPROFEN 400 MG/1
800 TABLET ORAL
Status: COMPLETED | OUTPATIENT
Start: 2019-09-13 | End: 2019-09-13

## 2019-09-13 RX ORDER — IBUPROFEN 200 MG
16 TABLET ORAL
Status: DISCONTINUED | OUTPATIENT
Start: 2019-09-13 | End: 2019-09-18 | Stop reason: HOSPADM

## 2019-09-13 RX ORDER — HYDROCODONE BITARTRATE AND ACETAMINOPHEN 5; 325 MG/1; MG/1
1 TABLET ORAL EVERY 6 HOURS PRN
Status: DISCONTINUED | OUTPATIENT
Start: 2019-09-13 | End: 2019-09-18 | Stop reason: HOSPADM

## 2019-09-13 RX ORDER — ACETAMINOPHEN 325 MG/1
650 TABLET ORAL EVERY 4 HOURS PRN
Status: DISCONTINUED | OUTPATIENT
Start: 2019-09-13 | End: 2019-09-15

## 2019-09-13 RX ORDER — AMLODIPINE BESYLATE 5 MG/1
10 TABLET ORAL DAILY
Status: DISCONTINUED | OUTPATIENT
Start: 2019-09-14 | End: 2019-09-18 | Stop reason: HOSPADM

## 2019-09-13 RX ORDER — SODIUM CHLORIDE 9 MG/ML
1000 INJECTION, SOLUTION INTRAVENOUS CONTINUOUS
Status: DISCONTINUED | OUTPATIENT
Start: 2019-09-14 | End: 2019-09-14

## 2019-09-13 RX ADMIN — SODIUM CHLORIDE, SODIUM LACTATE, POTASSIUM CHLORIDE, AND CALCIUM CHLORIDE 1000 ML: .6; .31; .03; .02 INJECTION, SOLUTION INTRAVENOUS at 03:09

## 2019-09-13 RX ADMIN — PIPERACILLIN AND TAZOBACTAM 4.5 G: 4; .5 INJECTION, POWDER, LYOPHILIZED, FOR SOLUTION INTRAVENOUS; PARENTERAL at 03:09

## 2019-09-13 RX ADMIN — IBUPROFEN 800 MG: 400 TABLET, FILM COATED ORAL at 03:09

## 2019-09-13 RX ADMIN — ENOXAPARIN SODIUM 40 MG: 100 INJECTION SUBCUTANEOUS at 11:09

## 2019-09-13 RX ADMIN — ACETAMINOPHEN 650 MG: 325 TABLET ORAL at 11:09

## 2019-09-13 RX ADMIN — ACETAMINOPHEN 1000 MG: 500 TABLET ORAL at 01:09

## 2019-09-13 RX ADMIN — SODIUM CHLORIDE, SODIUM LACTATE, POTASSIUM CHLORIDE, AND CALCIUM CHLORIDE 1000 ML: .6; .31; .03; .02 INJECTION, SOLUTION INTRAVENOUS at 01:09

## 2019-09-13 RX ADMIN — IOHEXOL 100 ML: 350 INJECTION, SOLUTION INTRAVENOUS at 03:09

## 2019-09-13 RX ADMIN — SODIUM CHLORIDE 1000 ML: 0.9 INJECTION, SOLUTION INTRAVENOUS at 11:09

## 2019-09-13 NOTE — ED PROVIDER NOTES
Encounter Date: 9/13/2019       History     Chief Complaint   Patient presents with    Urinary Frequency     PT reports frequent urination, body aches and chills that started yesterday. Pt denies pain or burning with urination. Uknown fever     23-year-old female presents to the emergency department for evaluation of 2 day history of fever, generalized malaise and urinary frequency.  She reports that the urinary frequency began yesterday morning and has been constant throughout the last 2 days.  She denies any burning with urination but just increased frequency of urination.  She reports mild lower abdominal pain and low back pain. She denies any vaginal bleeding, discharge or groin rash. She denies any nausea, vomiting, diarrhea or constipation.  She reports that she has a history of diabetes secondary to her PCOS.  She reports that she has not been taking her metformin for several months.  She states that she has not taken her blood glucose levels recently.  She reports that she has not checked her temperature at home but did feel slightly warm.  No treatment was attempted prior to arrival.  She denies any headache, dizziness, ear pain, nasal congestion, runny nose, sore throat, chest pain, cough, shortness breath or generalized rash.        Review of patient's allergies indicates:   Allergen Reactions    Vancomycin analogues Itching     Swollen lips      Past Medical History:   Diagnosis Date    Anemia     Anxiety     Hypertension      History reviewed. No pertinent surgical history.  Family History   Problem Relation Age of Onset    Hypertension Father     Diabetes Father     Hypertension Mother      Social History     Tobacco Use    Smoking status: Never Smoker    Smokeless tobacco: Never Used   Substance Use Topics    Alcohol use: No    Drug use: No     Review of Systems   Constitutional: Positive for chills and fever. Negative for activity change and appetite change.   HENT: Negative for  congestion, ear pain, facial swelling, rhinorrhea, sinus pressure, sinus pain, sore throat, trouble swallowing and voice change.    Eyes: Negative for photophobia and visual disturbance.   Respiratory: Negative for cough and shortness of breath.    Cardiovascular: Negative for chest pain.   Gastrointestinal: Positive for abdominal pain. Negative for constipation, diarrhea, nausea and vomiting.   Genitourinary: Positive for flank pain and frequency. Negative for decreased urine volume, dysuria, genital sores, hematuria, vaginal bleeding, vaginal discharge and vaginal pain.   Musculoskeletal: Negative for back pain and neck pain.   Skin: Negative for rash.   Neurological: Negative for dizziness, syncope, weakness, light-headedness, numbness and headaches.       Physical Exam     Initial Vitals [09/13/19 1303]   BP Pulse Resp Temp SpO2   (!) 158/97 (!) 139 (!) 22 (!) 102.7 °F (39.3 °C) 100 %      MAP       --         Physical Exam    Nursing note and vitals reviewed.  Constitutional: She appears well-developed and well-nourished. She is not diaphoretic. No distress.   HENT:   Head: Normocephalic and atraumatic.   Right Ear: External ear normal.   Left Ear: External ear normal.   Nose: Nose normal.   Mouth/Throat: Oropharynx is clear and moist.   Eyes: Conjunctivae and EOM are normal. Pupils are equal, round, and reactive to light.   Neck: Normal range of motion. Neck supple.   Cardiovascular: Normal rate, regular rhythm and normal heart sounds.   Pulmonary/Chest: Breath sounds normal. No respiratory distress. She has no wheezes. She has no rhonchi. She has no rales. She exhibits no tenderness.   Abdominal: Soft. Bowel sounds are normal. She exhibits no distension. There is tenderness in the right lower quadrant and suprapubic area. There is no rebound and no CVA tenderness.   Lymphadenopathy:     She has no cervical adenopathy.   Neurological: She is alert and oriented to person, place, and time.   Skin: Skin is warm  and dry.   Psychiatric: She has a normal mood and affect.         ED Course   Critical Care  Date/Time: 9/29/2019 9:20 PM  Performed by: Ana Maria Garcia PA-C  Authorized by: lOvin Liriano MD   Direct patient critical care time: 16 minutes  Ordering / reviewing critical care time: 7 minutes  Documentation critical care time: 9 minutes  Consulting other physicians critical care time: 4 minutes  Total critical care time (exclusive of procedural time) : 36 minutes  Critical care time was exclusive of separately billable procedures and treating other patients.  Critical care was necessary to treat or prevent imminent or life-threatening deterioration of the following conditions: sepsis.  Critical care was time spent personally by me on the following activities: ordering and review of radiographic studies, ordering and review of laboratory studies, ordering and performing treatments and interventions, evaluation of patient's response to treatment, examination of patient, re-evaluation of patient's condition, pulse oximetry, discussions with consultants and development of treatment plan with patient or surrogate.        Labs Reviewed   CBC W/ AUTO DIFFERENTIAL - Abnormal; Notable for the following components:       Result Value    WBC 22.47 (*)     Hemoglobin 10.2 (*)     Hematocrit 33.1 (*)     Mean Corpuscular Volume 65 (*)     Mean Corpuscular Hemoglobin 20.1 (*)     Mean Corpuscular Hemoglobin Conc 30.8 (*)     RDW 19.6 (*)     Platelets 369 (*)     Gran # (ANC) 18.2 (*)     Mono # 2.3 (*)     Gran% 81.6 (*)     Lymph% 7.7 (*)     All other components within normal limits   COMPREHENSIVE METABOLIC PANEL - Abnormal; Notable for the following components:    Sodium 134 (*)     Potassium 3.4 (*)     Total Protein 8.7 (*)     Total Bilirubin 1.1 (*)     eGFR if non  59.9 (*)     All other components within normal limits   URINALYSIS, REFLEX TO URINE CULTURE - Abnormal; Notable for the following  components:    Occult Blood UA Trace (*)     All other components within normal limits    Narrative:     Preferred Collection Type->Urine, Clean Catch   URINALYSIS MICROSCOPIC - Abnormal; Notable for the following components:    Bacteria Few (*)     All other components within normal limits    Narrative:     Preferred Collection Type->Urine, Clean Catch   LACTIC ACID, PLASMA   PREGNANCY TEST, URINE RAPID   BETA - HYDROXYBUTYRATE, SERUM   LIPASE   LIPASE   LACTIC ACID, PLASMA   POCT GLUCOSE          Imaging Results          CT Abdomen Pelvis With Contrast (Final result)  Result time 09/13/19 15:11:51    Final result by RICHARD Kumari Sr., MD (09/13/19 15:11:51)                 Impression:      1. There is a moderate amount of right perinephric stranding. There is no hydronephrosis or nephrolithiasis. There is a moderate amount of haziness adjacent to the proximal portion of the right ureter. There is no hydroureter or urolithiasis.  A urinary tract infection cannot be excluded.  2. The appendix is normal in appearance.  3. There is a large umbilical hernia. The width of the mouth of this hernia measures 32 mm. There are loops of small bowel that extend into this hernia. There is no evidence of obstruction associated with this hernia.  All CT scans at this facility use dose modulation, iterative reconstruction, and/or weight base dosing when appropriate to reduce radiation dose when appropriate to reduce radiation dose to as low as reasonably achievable.      Electronically signed by: José Luis Kumari MD  Date:    09/13/2019  Time:    15:11             Narrative:    EXAMINATION:  CT ABDOMEN PELVIS WITH CONTRAST    CLINICAL HISTORY:  RLQ pain, appendicitis suspected;    TECHNIQUE:  Standard abdomen and pelvis CT protocol with IV contrast was performed.  100 mL of Omnipaque 350 contrast material was used for this examination.    COMPARISON:  None    FINDINGS:  Finding: The size of the heart is within normal limits. The  lungs are clear. There is no pneumothorax or pleural effusion.    The liver, gallbladder, pancreas, spleen, adrenals, and left kidney are normal in appearance.  There is a moderate amount of right perinephric stranding.  There is no hydronephrosis or nephrolithiasis.  There is a moderate amount of haziness adjacent to the proximal portion of the right ureter.  There is no hydroureter or urolithiasis.  The left ureter and the urinary bladder are normal in appearance.  There is a large umbilical hernia.  The width of the mouth of this hernia measures 32 mm.  There are loops of small bowel that extend into this hernia.  There is no evidence of obstruction associated with this hernia.  The appendix is normal in appearance. There is no free fluid within the abdomen or pelvis. There is no pneumoperitoneum.                                 Medical Decision Making:   Initial Assessment:   23-year-old female presents for evaluation of urinary frequency, generalized malaise and fever.  Physical exam reveals a nontoxic-appearing female in no acute distress. Patient is febrile tachycardic and mildly hypertensive.  Neurological exam reveals an alert and oriented patient.  TMs reveal no erythema.  Posterior pharynx reveals no erythema, edema or tonsillar exudate.  Neck is supple, no meningeal signs noted.  Lungs clear to auscultation bilaterally. No respiratory distress or accessory muscle use noted.  Abdominal exam reveals soft abdomen, mildly tender to palpation over the suprapubic and right lower quadrant.  No peritoneal signs noted. No CVA tenderness noted.  Differential Diagnosis:   Urinary tract infection  Pyelonephritis  Sepsis    ED Management:  Point of care glucose 92. UPT negative.  Urinalysis reveals trace blood and no evidence of UTI.  Beta hydroxybutyrate 0.2.  CBC reveals WBC 22.47, anemia with hemoglobin 10.2 and hematocrit 33.1.  CMP reveals sodium 134, potassium 3.4, total bilirubin 1.1.  Lactic acid 2.1.  Lipase  74.  CT of the abdomen/pelvis revealed moderate amount of right perinephric stranding.  No hydronephrosis or nephrolithiasis noted.  Moderate amount of haziness adjacent to the proximal portion of the right ureter.  There is no hydroureter or urolithiasis.  A urinary tract infection cannot be excluded.  The appendix appears normal.  Large umbilical hernia.  No evidence of obstruction associated with the hernia.  Discussed this patient with Dr. Chapman who will assume continued care of this patient for sepsis and pyelonephritis.  Dr. Liriano evaluated this patient and is in agreement course of treatment.                      Clinical Impression:       ICD-10-CM ICD-9-CM   1. Sepsis, due to unspecified organism A41.9 038.9     995.91   2. Pyelonephritis N12 590.80                                Ana Maria Garcia PA-C  09/13/19 1659       Olvin Liriano MD  09/29/19 1337

## 2019-09-13 NOTE — ED NOTES
Pt's father demanding to speak with Dr. Liriano. He refused to speak with SCOTT Rodgers. Explained that doctor is aware that he wants to speak with him and will be in as soon as he can. Father states they are leaving if doctor does not come in to talk with him. He is very short and irate. Nupur, charge nurse and Dr. Liriano updated on situation. Both at bedside to speak with pt and family.

## 2019-09-13 NOTE — ED TRIAGE NOTES
PT reports frequent urination, body aches and chills that started yesterday. Pt denies pain or burning with urination. Uknown fever

## 2019-09-13 NOTE — ED NOTES
[facility] ED charge nurse Ena called for CDU bed. States unit is full but someone is being discharged. Asked to call back in 15 mins.

## 2019-09-14 PROBLEM — D72.829 LEUKOCYTOSIS: Status: ACTIVE | Noted: 2019-09-14

## 2019-09-14 PROBLEM — D64.9 ANEMIA: Status: ACTIVE | Noted: 2019-09-14

## 2019-09-14 PROBLEM — E87.1 HYPONATREMIA: Status: ACTIVE | Noted: 2019-09-14

## 2019-09-14 PROBLEM — E87.6 HYPOKALEMIA: Status: ACTIVE | Noted: 2019-09-14

## 2019-09-14 LAB
ANION GAP SERPL CALC-SCNC: 8 MMOL/L (ref 8–16)
ANISOCYTOSIS BLD QL SMEAR: SLIGHT
BASOPHILS # BLD AUTO: 0.02 K/UL (ref 0–0.2)
BASOPHILS NFR BLD: 0.1 % (ref 0–1.9)
BUN SERPL-MCNC: 13 MG/DL (ref 6–20)
CALCIUM SERPL-MCNC: 9 MG/DL (ref 8.7–10.5)
CHLORIDE SERPL-SCNC: 105 MMOL/L (ref 95–110)
CO2 SERPL-SCNC: 23 MMOL/L (ref 23–29)
CREAT SERPL-MCNC: 1 MG/DL (ref 0.5–1.4)
DIFFERENTIAL METHOD: ABNORMAL
EOSINOPHIL # BLD AUTO: 0 K/UL (ref 0–0.5)
EOSINOPHIL NFR BLD: 0 % (ref 0–8)
ERYTHROCYTE [DISTWIDTH] IN BLOOD BY AUTOMATED COUNT: 19.2 % (ref 11.5–14.5)
EST. GFR  (AFRICAN AMERICAN): >60 ML/MIN/1.73 M^2
EST. GFR  (NON AFRICAN AMERICAN): >60 ML/MIN/1.73 M^2
GLUCOSE SERPL-MCNC: 93 MG/DL (ref 70–110)
HCT VFR BLD AUTO: 27.9 % (ref 37–48.5)
HGB BLD-MCNC: 8.2 G/DL (ref 12–16)
LYMPHOCYTES # BLD AUTO: 0.7 K/UL (ref 1–4.8)
LYMPHOCYTES NFR BLD: 3.5 % (ref 18–48)
MCH RBC QN AUTO: 19.8 PG (ref 27–31)
MCHC RBC AUTO-ENTMCNC: 29.4 G/DL (ref 32–36)
MCV RBC AUTO: 67 FL (ref 82–98)
MONOCYTES # BLD AUTO: 2.4 K/UL (ref 0.3–1)
MONOCYTES NFR BLD: 11.8 % (ref 4–15)
NEUTROPHILS # BLD AUTO: 17.3 K/UL (ref 1.8–7.7)
NEUTROPHILS NFR BLD: 84.6 % (ref 38–73)
PLATELET # BLD AUTO: 320 K/UL (ref 150–350)
PLATELET BLD QL SMEAR: ABNORMAL
PMV BLD AUTO: 10.2 FL (ref 9.2–12.9)
POCT GLUCOSE: 101 MG/DL (ref 70–110)
POCT GLUCOSE: 77 MG/DL (ref 70–110)
POIKILOCYTOSIS BLD QL SMEAR: SLIGHT
POTASSIUM SERPL-SCNC: 4.1 MMOL/L (ref 3.5–5.1)
RBC # BLD AUTO: 4.15 M/UL (ref 4–5.4)
SODIUM SERPL-SCNC: 136 MMOL/L (ref 136–145)
WBC # BLD AUTO: 20.53 K/UL (ref 3.9–12.7)

## 2019-09-14 PROCEDURE — 80048 BASIC METABOLIC PNL TOTAL CA: CPT

## 2019-09-14 PROCEDURE — 36415 COLL VENOUS BLD VENIPUNCTURE: CPT

## 2019-09-14 PROCEDURE — 94761 N-INVAS EAR/PLS OXIMETRY MLT: CPT

## 2019-09-14 PROCEDURE — 25000003 PHARM REV CODE 250: Performed by: NURSE PRACTITIONER

## 2019-09-14 PROCEDURE — 85025 COMPLETE CBC W/AUTO DIFF WBC: CPT

## 2019-09-14 PROCEDURE — 96376 TX/PRO/DX INJ SAME DRUG ADON: CPT | Performed by: EMERGENCY MEDICINE

## 2019-09-14 PROCEDURE — 87040 BLOOD CULTURE FOR BACTERIA: CPT

## 2019-09-14 PROCEDURE — 11000001 HC ACUTE MED/SURG PRIVATE ROOM

## 2019-09-14 PROCEDURE — 63600175 PHARM REV CODE 636 W HCPCS: Performed by: NURSE PRACTITIONER

## 2019-09-14 PROCEDURE — 25000003 PHARM REV CODE 250: Performed by: FAMILY MEDICINE

## 2019-09-14 PROCEDURE — 96361 HYDRATE IV INFUSION ADD-ON: CPT | Performed by: EMERGENCY MEDICINE

## 2019-09-14 PROCEDURE — 63600175 PHARM REV CODE 636 W HCPCS: Performed by: FAMILY MEDICINE

## 2019-09-14 RX ORDER — IBUPROFEN 400 MG/1
400 TABLET ORAL EVERY 4 HOURS PRN
Status: DISCONTINUED | OUTPATIENT
Start: 2019-09-14 | End: 2019-09-18 | Stop reason: HOSPADM

## 2019-09-14 RX ORDER — VANCOMYCIN HCL IN 5 % DEXTROSE 1G/250ML
1000 PLASTIC BAG, INJECTION (ML) INTRAVENOUS
Status: DISCONTINUED | OUTPATIENT
Start: 2019-09-15 | End: 2019-09-15

## 2019-09-14 RX ORDER — POTASSIUM CHLORIDE 20 MEQ/1
40 TABLET, EXTENDED RELEASE ORAL ONCE
Status: DISCONTINUED | OUTPATIENT
Start: 2019-09-14 | End: 2019-09-14

## 2019-09-14 RX ORDER — DIPHENHYDRAMINE HYDROCHLORIDE 50 MG/ML
25 INJECTION INTRAMUSCULAR; INTRAVENOUS ONCE
Status: COMPLETED | OUTPATIENT
Start: 2019-09-14 | End: 2019-09-14

## 2019-09-14 RX ORDER — SODIUM CHLORIDE 9 MG/ML
INJECTION, SOLUTION INTRAVENOUS CONTINUOUS
Status: DISCONTINUED | OUTPATIENT
Start: 2019-09-14 | End: 2019-09-18 | Stop reason: HOSPADM

## 2019-09-14 RX ADMIN — SODIUM CHLORIDE: 0.9 INJECTION, SOLUTION INTRAVENOUS at 05:09

## 2019-09-14 RX ADMIN — PIPERACILLIN AND TAZOBACTAM 4.5 G: 4; .5 INJECTION, POWDER, LYOPHILIZED, FOR SOLUTION INTRAVENOUS; PARENTERAL at 03:09

## 2019-09-14 RX ADMIN — DIPHENHYDRAMINE HYDROCHLORIDE 25 MG: 50 INJECTION, SOLUTION INTRAMUSCULAR; INTRAVENOUS at 11:09

## 2019-09-14 RX ADMIN — HYDROCODONE BITARTRATE AND ACETAMINOPHEN 1 TABLET: 5; 325 TABLET ORAL at 04:09

## 2019-09-14 RX ADMIN — AMLODIPINE BESYLATE 10 MG: 5 TABLET ORAL at 08:09

## 2019-09-14 RX ADMIN — HYDROCODONE BITARTRATE AND ACETAMINOPHEN 1 TABLET: 5; 325 TABLET ORAL at 06:09

## 2019-09-14 RX ADMIN — IBUPROFEN 400 MG: 400 TABLET, FILM COATED ORAL at 03:09

## 2019-09-14 RX ADMIN — PIPERACILLIN AND TAZOBACTAM 4.5 G: 4; .5 INJECTION, POWDER, LYOPHILIZED, FOR SOLUTION INTRAVENOUS; PARENTERAL at 06:09

## 2019-09-14 RX ADMIN — IBUPROFEN 400 MG: 400 TABLET, FILM COATED ORAL at 06:09

## 2019-09-14 RX ADMIN — ENOXAPARIN SODIUM 40 MG: 100 INJECTION SUBCUTANEOUS at 06:09

## 2019-09-14 RX ADMIN — VANCOMYCIN HYDROCHLORIDE 2250 MG: 1 INJECTION, POWDER, LYOPHILIZED, FOR SOLUTION INTRAVENOUS at 08:09

## 2019-09-14 RX ADMIN — PIPERACILLIN AND TAZOBACTAM 4.5 G: 4; .5 INJECTION, POWDER, LYOPHILIZED, FOR SOLUTION INTRAVENOUS; PARENTERAL at 11:09

## 2019-09-14 RX ADMIN — POTASSIUM CHLORIDE 40 MEQ: 20 SOLUTION ORAL at 12:09

## 2019-09-14 RX ADMIN — PIPERACILLIN AND TAZOBACTAM 4.5 G: 4; .5 INJECTION, POWDER, LYOPHILIZED, FOR SOLUTION INTRAVENOUS; PARENTERAL at 12:09

## 2019-09-14 NOTE — ED NOTES
Call made to [facility]shilpa regarding ETA, dispatcher reports 30 minutes until arrival.

## 2019-09-14 NOTE — ED NOTES
Patient aox4, lying on stretcher in no acute distress, vss taken, vss stable. Patient denies any pain but does report generalized body aches. Patient afebrile at this time. Patient denies any needs at this time. Boyfriend at beside. Call light within reach. Will continue to monitor.

## 2019-09-14 NOTE — ASSESSMENT & PLAN NOTE
Leukocytosis  Hyponatremia    WBC (22.47), Lactic acid (2.1), Temp 99.0F-103.3F  Continuous cardiac monitoring  Zosyn 4.5 gms IV every 8 hours  Blood cx pending  Gentle hydration, NS 125mg/hr

## 2019-09-14 NOTE — SUBJECTIVE & OBJECTIVE
Past Medical History:   Diagnosis Date    Anemia     Anxiety     Hypertension        History reviewed. No pertinent surgical history.    Review of patient's allergies indicates:  No Known Allergies    No current facility-administered medications on file prior to encounter.      Current Outpatient Medications on File Prior to Encounter   Medication Sig    amLODIPine (NORVASC) 10 MG tablet Take 10 mg by mouth once daily.    ferrous sulfate (FEOSOL) 325 mg (65 mg iron) Tab tablet Take 1 tablet (325 mg total) by mouth once daily.    medroxyPROGESTERone (PROVERA) 10 MG tablet Take 1 tablet (10 mg total) by mouth once daily. Take 1 pill daily for 10 days out the month for 10 days    moxifloxacin (VIGAMOX) 0.5 % ophthalmic solution Place 1 drop into the left eye 3 (three) times daily.     Family History     Problem Relation (Age of Onset)    Diabetes Father    Hypertension Father, Mother        Tobacco Use    Smoking status: Never Smoker    Smokeless tobacco: Never Used   Substance and Sexual Activity    Alcohol use: No    Drug use: No    Sexual activity: Yes     Partners: Male     Review of Systems   Constitutional: Positive for chills and fever.   HENT: Positive for trouble swallowing. Negative for congestion, dental problem and facial swelling.    Eyes: Negative for visual disturbance.   Respiratory: Negative for cough, choking, chest tightness and shortness of breath.    Cardiovascular: Negative for chest pain and palpitations.   Gastrointestinal: Positive for abdominal pain. Negative for abdominal distention, diarrhea, nausea and vomiting.   Endocrine: Negative for polydipsia and polyuria.   Genitourinary: Positive for flank pain, frequency and urgency. Negative for dysuria and hematuria.   Musculoskeletal: Negative for back pain, gait problem and joint swelling.   Skin: Negative for color change and rash.   Allergic/Immunologic: Negative for food allergies.   Neurological: Positive for weakness. Negative  for dizziness, syncope and headaches.   Psychiatric/Behavioral: Negative for agitation.     Objective:     Vital Signs (Most Recent):  Temp: (!) 103.2 °F (39.6 °C) (09/13/19 2350)  Pulse: (!) 126 (09/13/19 2325)  Resp: (!) 22 (09/13/19 2325)  BP: (!) 143/86 (09/13/19 2325)  SpO2: 99 % (09/13/19 2325) Vital Signs (24h Range):  Temp:  [98.3 °F (36.8 °C)-103.3 °F (39.6 °C)] 103.2 °F (39.6 °C)  Pulse:  [] 126  Resp:  [17-22] 22  SpO2:  [99 %-100 %] 99 %  BP: (129-158)/(65-97) 143/86     Weight: 78.5 kg (173 lb)  Body mass index is 30.65 kg/m².    Physical Exam   Constitutional: She is oriented to person, place, and time. No distress.   HENT:   Head: Normocephalic and atraumatic.   Eyes: Pupils are equal, round, and reactive to light.   Neck: Normal range of motion. No JVD present.   Cardiovascular: Regular rhythm.   Tachycardia   Pulmonary/Chest: Effort normal and breath sounds normal. No respiratory distress.   Abdominal: Soft. Bowel sounds are normal. She exhibits no distension. There is tenderness.   Musculoskeletal: Normal range of motion.   Neurological: She is alert and oriented to person, place, and time.   Skin: Skin is warm and dry. Capillary refill takes less than 2 seconds. She is not diaphoretic.   Psychiatric: She has a normal mood and affect.         CRANIAL NERVES     CN III, IV, VI   Pupils are equal, round, and reactive to light.       Significant Labs:   Bilirubin:   Recent Labs   Lab 09/13/19  1338   BILITOT 1.1*     BMP:   Recent Labs   Lab 09/13/19  1338      *   K 3.4*   CL 98   CO2 26   BUN 13   CREATININE 1.25   CALCIUM 9.5     CBC:   Recent Labs   Lab 09/13/19  1338   WBC 22.47*   HGB 10.2*   HCT 33.1*   *     CMP:   Recent Labs   Lab 09/13/19  1338   *   K 3.4*   CL 98   CO2 26      BUN 13   CREATININE 1.25   CALCIUM 9.5   PROT 8.7*   ALBUMIN 4.3   BILITOT 1.1*   ALKPHOS 100   AST 21   ALT 16   ANIONGAP 10   EGFRNONAA 59.9*     Lactic Acid:   Recent Labs    Lab 09/13/19  1338 09/13/19  1807   LACTATE 2.1 1.1     POCT Glucose:   Recent Labs   Lab 09/13/19  1344 09/13/19  2335   POCTGLUCOSE 92 74     Urine Studies:   Recent Labs   Lab 09/13/19  1347   COLORU Straw   APPEARANCEUA Clear   PHUR 5.0   SPECGRAV 1.015   PROTEINUA Negative   GLUCUA Negative   KETONESU Negative   BILIRUBINUA Negative   OCCULTUA Trace*   NITRITE Negative   UROBILINOGEN Negative   LEUKOCYTESUR Negative   RBCUA 1   WBCUA 1   BACTERIA Few*     All pertinent labs within the past 24 hours have been reviewed.    Significant Imaging: I have reviewed all pertinent imaging results/findings within the past 24 hours.     Imaging Results          CT Abdomen Pelvis With Contrast (Final result)  Result time 09/13/19 15:11:51    Final result by RICHARD Kumari Sr., MD (09/13/19 15:11:51)                 Impression:      1. There is a moderate amount of right perinephric stranding. There is no hydronephrosis or nephrolithiasis. There is a moderate amount of haziness adjacent to the proximal portion of the right ureter. There is no hydroureter or urolithiasis.  A urinary tract infection cannot be excluded.  2. The appendix is normal in appearance.  3. There is a large umbilical hernia. The width of the mouth of this hernia measures 32 mm. There are loops of small bowel that extend into this hernia. There is no evidence of obstruction associated with this hernia.  All CT scans at this facility use dose modulation, iterative reconstruction, and/or weight base dosing when appropriate to reduce radiation dose when appropriate to reduce radiation dose to as low as reasonably achievable.      Electronically signed by: José Luis Kumari MD  Date:    09/13/2019  Time:    15:11             Narrative:    EXAMINATION:  CT ABDOMEN PELVIS WITH CONTRAST    CLINICAL HISTORY:  RLQ pain, appendicitis suspected;    TECHNIQUE:  Standard abdomen and pelvis CT protocol with IV contrast was performed.  100 mL of Omnipaque 350  contrast material was used for this examination.    COMPARISON:  None    FINDINGS:  Finding: The size of the heart is within normal limits. The lungs are clear. There is no pneumothorax or pleural effusion.    The liver, gallbladder, pancreas, spleen, adrenals, and left kidney are normal in appearance.  There is a moderate amount of right perinephric stranding.  There is no hydronephrosis or nephrolithiasis.  There is a moderate amount of haziness adjacent to the proximal portion of the right ureter.  There is no hydroureter or urolithiasis.  The left ureter and the urinary bladder are normal in appearance.  There is a large umbilical hernia.  The width of the mouth of this hernia measures 32 mm.  There are loops of small bowel that extend into this hernia.  There is no evidence of obstruction associated with this hernia.  The appendix is normal in appearance. There is no free fluid within the abdomen or pelvis. There is no pneumoperitoneum.

## 2019-09-14 NOTE — PROGRESS NOTES
Pharmacokinetic Initial Assessment: IV Vancomycin    Assessment/Plan:    Initiate intravenous vancomycin with loading dose of 2250 mg once followed by a maintenance dose of vancomycin 1000 mg IV every 12 hours  Desired empiric serum trough concentration is 10 to 15 mcg/mL  Draw vancomycin trough level 30 min prior to fourth dose on 9-16-19 at approximately 0600   Pharmacy will continue to follow and monitor vancomycin.      Please contact pharmacy at extension [phone] with any questions regarding this assessment.     Thank you for the consult,   Robert Leong       Patient brief summary:  Meghna Dozier is a 23 y.o. female initiated on antimicrobial therapy with IV Vancomycin for treatment of suspected urinary tract infection    Drug Allergies:   Review of patient's allergies indicates:  No Known Allergies    Actual Body Weight:   78.8 kg    Renal Function:   Estimated Creatinine Clearance: 85.5 mL/min (based on SCr of 1 mg/dL).,     Dialysis Method (if applicable):  N/A    CBC (last 72 hours):  Recent Labs   Lab Result Units 09/13/19  1338 09/14/19  0618   WBC K/uL 22.47* 20.53*   Hemoglobin g/dL 10.2* 8.2*   Hematocrit % 33.1* 27.9*   Platelets K/uL 369* 320   Gran% % 81.6* 84.6*   Lymph% % 7.7* 3.5*   Mono% % 10.3 11.8   Eosinophil% % 0.0 0.0   Basophil% % 0.4 0.1   Differential Method  Automated Automated       Metabolic Panel (last 72 hours):  Recent Labs   Lab Result Units 09/13/19  1338 09/13/19  1347 09/14/19  0618   Sodium mmol/L 134*  --  136   Potassium mmol/L 3.4*  --  4.1   Chloride mmol/L 98  --  105   CO2 mmol/L 26  --  23   Glucose mg/dL 104  --  93   Glucose, UA   --  Negative  --    BUN, Bld mg/dL 13  --  13   Creatinine mg/dL 1.25  --  1.0   Albumin g/dL 4.3  --   --    Total Bilirubin mg/dL 1.1*  --   --    Alkaline Phosphatase U/L 100  --   --    AST U/L 21  --   --    ALT U/L 16  --   --        Drug levels (last 3 results):  No results for input(s): VANCOMYCINRA, VANCOMYCINPE, VANCOMYCINTR in the  last 72 hours.    Microbiologic Results:  Microbiology Results (last 7 days)       Procedure Component Value Units Date/Time    Blood culture [[id number]] Collected:  09/14/19 0617    Order Status:  Completed Specimen:  Blood Updated:  09/14/19 1715     Blood Culture, Routine No Growth to date    Blood culture [[id number]] Collected:  09/13/19 1339    Order Status:  Completed Specimen:  Blood from Peripheral, Antecubital, Left Updated:  09/14/19 0715     Blood Culture, Routine No Growth to date    Blood culture [[id number]] Collected:  09/13/19 1339    Order Status:  Completed Specimen:  Blood from Peripheral, Antecubital, Left Updated:  09/14/19 0715     Blood Culture, Routine No Growth to date    Urine culture [[id number]] Collected:  09/13/19 1649    Order Status:  Sent Specimen:  Urine, Clean Catch Updated:  09/13/19 8277

## 2019-09-14 NOTE — H&P
[facility] Medicine  History & Physical    Patient Name: Meghna Dozier  MRN: [id number]  Admission Date: 9/13/2019  Attending Physician: Lory Lopes*   Primary Care Provider: Hubert Still MD         Patient information was obtained from patient and ER records.     Subjective:     Principal Problem:Sepsis    Chief Complaint:   Chief Complaint   Patient presents with    Urinary Frequency     PT reports frequent urination, body aches and chills that started yesterday. Pt denies pain or burning with urination. Uknown fever        HPI: Meghna Dozier is a 23 year old female with a past medical history of Anemia, Anxiety and Hypertension. She lives in [place], [place]. Her PCP is Dr. Hubert Still.    She presented to [facility] 9/13/2019 with a chief complaint of urinary frequency that began one day prior. Associated symptoms include fever and generalized malaise.  She denies any burning with urination but just increased frequency of urination.  She reports mild lower abdominal pain and low back pain. She denies any vaginal bleeding, discharge or groin rash. She denies any nausea, vomiting, diarrhea or constipation.  She reports that she has a history of diabetes secondary to her PCOS.  She reports that she has not been taking her metformin for several months.  She states that she has not taken her blood glucose levels recently.     ED workup revealed Point of care glucose 92. UPT negative.  Urinalysis reveals trace blood and no evidence of UTI.  Beta hydroxybutyrate 0.2.  CBC reveals WBC 22.47, anemia with hemoglobin 10.2 and hematocrit 33.1.  CMP reveals sodium 134, potassium 3.4, total bilirubin 1.1.  Lactic acid 2.1.  Lipase 74.  CT of the abdomen/pelvis revealed moderate amount of right perinephric stranding.  No hydronephrosis or nephrolithiasis noted.  Moderate amount of haziness adjacent to the proximal portion of the right ureter.  There is no hydroureter or  urolithiasis. The appendix appears normal.  Large umbilical hernia.  No evidence of obstruction associated with the hernia. Transferred to [facility] for higher level of care and further evaluation and treatment of sepsi and pyelonephritis.     Past Medical History:   Diagnosis Date    Anemia     Anxiety     Hypertension        History reviewed. No pertinent surgical history.    Review of patient's allergies indicates:  No Known Allergies    No current facility-administered medications on file prior to encounter.      Current Outpatient Medications on File Prior to Encounter   Medication Sig    amLODIPine (NORVASC) 10 MG tablet Take 10 mg by mouth once daily.    ferrous sulfate (FEOSOL) 325 mg (65 mg iron) Tab tablet Take 1 tablet (325 mg total) by mouth once daily.    medroxyPROGESTERone (PROVERA) 10 MG tablet Take 1 tablet (10 mg total) by mouth once daily. Take 1 pill daily for 10 days out the month for 10 days    moxifloxacin (VIGAMOX) 0.5 % ophthalmic solution Place 1 drop into the left eye 3 (three) times daily.     Family History     Problem Relation (Age of Onset)    Diabetes Father    Hypertension Father, Mother        Tobacco Use    Smoking status: Never Smoker    Smokeless tobacco: Never Used   Substance and Sexual Activity    Alcohol use: No    Drug use: No    Sexual activity: Yes     Partners: Male     Review of Systems   Constitutional: Positive for chills and fever.   HENT: Positive for trouble swallowing. Negative for congestion, dental problem and facial swelling.    Eyes: Negative for visual disturbance.   Respiratory: Negative for cough, choking, chest tightness and shortness of breath.    Cardiovascular: Negative for chest pain and palpitations.   Gastrointestinal: Positive for abdominal pain. Negative for abdominal distention, diarrhea, nausea and vomiting.   Endocrine: Negative for polydipsia and polyuria.   Genitourinary: Positive for flank pain, frequency and  urgency. Negative for dysuria and hematuria.   Musculoskeletal: Negative for back pain, gait problem and joint swelling.   Skin: Negative for color change and rash.   Allergic/Immunologic: Negative for food allergies.   Neurological: Positive for weakness. Negative for dizziness, syncope and headaches.   Psychiatric/Behavioral: Negative for agitation.     Objective:     Vital Signs (Most Recent):  Temp: (!) 103.2 °F (39.6 °C) (09/13/19 2350)  Pulse: (!) 126 (09/13/19 2325)  Resp: (!) 22 (09/13/19 2325)  BP: (!) 143/86 (09/13/19 2325)  SpO2: 99 % (09/13/19 2325) Vital Signs (24h Range):  Temp:  [98.3 °F (36.8 °C)-103.3 °F (39.6 °C)] 103.2 °F (39.6 °C)  Pulse:  [] 126  Resp:  [17-22] 22  SpO2:  [99 %-100 %] 99 %  BP: (129-158)/(65-97) 143/86     Weight: 78.5 kg (173 lb)  Body mass index is 30.65 kg/m².    Physical Exam   Constitutional: She is oriented to person, place, and time. No distress.   HENT:   Head: Normocephalic and atraumatic.   Eyes: Pupils are equal, round, and reactive to light.   Neck: Normal range of motion. No JVD present.   Cardiovascular: Regular rhythm.   Tachycardia   Pulmonary/Chest: Effort normal and breath sounds normal. No respiratory distress.   Abdominal: Soft. Bowel sounds are normal. She exhibits no distension. There is tenderness.   Musculoskeletal: Normal range of motion.   Neurological: She is alert and oriented to person, place, and time.   Skin: Skin is warm and dry. Capillary refill takes less than 2 seconds. She is not diaphoretic.   Psychiatric: She has a normal mood and affect.         CRANIAL NERVES     CN III, IV, VI   Pupils are equal, round, and reactive to light.       Significant Labs:   Bilirubin:   Recent Labs   Lab 09/13/19  1338   BILITOT 1.1*     BMP:   Recent Labs   Lab 09/13/19  1338      *   K 3.4*   CL 98   CO2 26   BUN 13   CREATININE 1.25   CALCIUM 9.5     CBC:   Recent Labs   Lab 09/13/19  1338   WBC 22.47*   HGB 10.2*   HCT 33.1*   *      CMP:   Recent Labs   Lab 09/13/19  1338   *   K 3.4*   CL 98   CO2 26      BUN 13   CREATININE 1.25   CALCIUM 9.5   PROT 8.7*   ALBUMIN 4.3   BILITOT 1.1*   ALKPHOS 100   AST 21   ALT 16   ANIONGAP 10   EGFRNONAA 59.9*     Lactic Acid:   Recent Labs   Lab 09/13/19  1338 09/13/19  1807   LACTATE 2.1 1.1     POCT Glucose:   Recent Labs   Lab 09/13/19  1344 09/13/19  2335   POCTGLUCOSE 92 74     Urine Studies:   Recent Labs   Lab 09/13/19  1347   COLORU Straw   APPEARANCEUA Clear   PHUR 5.0   SPECGRAV 1.015   PROTEINUA Negative   GLUCUA Negative   KETONESU Negative   BILIRUBINUA Negative   OCCULTUA Trace*   NITRITE Negative   UROBILINOGEN Negative   LEUKOCYTESUR Negative   RBCUA 1   WBCUA 1   BACTERIA Few*     All pertinent labs within the past 24 hours have been reviewed.    Significant Imaging: I have reviewed all pertinent imaging results/findings within the past 24 hours.     Imaging Results          CT Abdomen Pelvis With Contrast (Final result)  Result time 09/13/19 15:11:51    Final result by RICHARD Kumari Sr., MD (09/13/19 15:11:51)                 Impression:      1. There is a moderate amount of right perinephric stranding. There is no hydronephrosis or nephrolithiasis. There is a moderate amount of haziness adjacent to the proximal portion of the right ureter. There is no hydroureter or urolithiasis.  A urinary tract infection cannot be excluded.  2. The appendix is normal in appearance.  3. There is a large umbilical hernia. The width of the mouth of this hernia measures 32 mm. There are loops of small bowel that extend into this hernia. There is no evidence of obstruction associated with this hernia.  All CT scans at this facility use dose modulation, iterative reconstruction, and/or weight base dosing when appropriate to reduce radiation dose when appropriate to reduce radiation dose to as low as reasonably achievable.      Electronically signed by: José Luis Kumari  MD  Date:    09/13/2019  Time:    15:11             Narrative:    EXAMINATION:  CT ABDOMEN PELVIS WITH CONTRAST    CLINICAL HISTORY:  RLQ pain, appendicitis suspected;    TECHNIQUE:  Standard abdomen and pelvis CT protocol with IV contrast was performed.  100 mL of Omnipaque 350 contrast material was used for this examination.    COMPARISON:  None    FINDINGS:  Finding: The size of the heart is within normal limits. The lungs are clear. There is no pneumothorax or pleural effusion.    The liver, gallbladder, pancreas, spleen, adrenals, and left kidney are normal in appearance.  There is a moderate amount of right perinephric stranding.  There is no hydronephrosis or nephrolithiasis.  There is a moderate amount of haziness adjacent to the proximal portion of the right ureter.  There is no hydroureter or urolithiasis.  The left ureter and the urinary bladder are normal in appearance.  There is a large umbilical hernia.  The width of the mouth of this hernia measures 32 mm.  There are loops of small bowel that extend into this hernia.  There is no evidence of obstruction associated with this hernia.  The appendix is normal in appearance. There is no free fluid within the abdomen or pelvis. There is no pneumoperitoneum.                                  Assessment/Plan:     * Sepsis  Leukocytosis  Hyponatremia    WBC (22.47), Lactic acid (2.1), Temp 99.0F-103.3F  Continuous cardiac monitoring  Zosyn 4.5 gms IV every 8 hours  Blood cx pending  Gentle hydration, NS 125mg/hr      Anemia  Iron deficiency anemia due to chronic blood loss    H&H stable  Monitor CBC      Hypokalemia  K 3.4, replace orally  Monitor BMP      PCOS (polycystic ovarian syndrome)  Follows Dr. Gilma Mejia, non compliant with Metformin.      Essential hypertension  -158  Continue Amlodipine 10 mg po daily       VTE Risk Mitigation (From admission, onward)        Ordered     enoxaparin injection 40 mg  Daily      09/13/19 2300     IP VTE HIGH  RISK PATIENT  Once      09/13/19 4678             Gracie Moore, APRN, FNP-C  Department of Hospital Medicine   [facility]

## 2019-09-14 NOTE — ED NOTES
Patient aox4, sitting up on stretcher watching television, vs obtained, vss, patient continues to be afebrile. Patient denies any needs at this time. Boyfriend at bedside. Call light within reach. Will continue to monitor.

## 2019-09-14 NOTE — ED NOTES
Call made to [facility] regarding ETA of transfer. Dispatcher reports another 45 minutes to 1 hour for transfer d/t football games in the area.

## 2019-09-14 NOTE — HPI
Meghna Dozier is a 23 year old female with a past medical history of Anemia, Anxiety and Hypertension. She lives in [place], [place]. Her PCP is Dr. Hubert Still.    She presented to [facility] 9/13/2019 with a chief complaint of urinary frequency that began one day prior. Associated symptoms include fever and generalized malaise.  She denies any burning with urination but just increased frequency of urination.  She reports mild lower abdominal pain and low back pain. She denies any vaginal bleeding, discharge or groin rash. She denies any nausea, vomiting, diarrhea or constipation.  She reports that she has a history of diabetes secondary to her PCOS.  She reports that she has not been taking her metformin for several months.  She states that she has not taken her blood glucose levels recently.     ED workup revealed Point of care glucose 92. UPT negative.  Urinalysis reveals trace blood and no evidence of UTI.  Beta hydroxybutyrate 0.2.  CBC reveals WBC 22.47, anemia with hemoglobin 10.2 and hematocrit 33.1.  CMP reveals sodium 134, potassium 3.4, total bilirubin 1.1.  Lactic acid 2.1.  Lipase 74.  CT of the abdomen/pelvis revealed moderate amount of right perinephric stranding.  No hydronephrosis or nephrolithiasis noted.  Moderate amount of haziness adjacent to the proximal portion of the right ureter.  There is no hydroureter or urolithiasis. The appendix appears normal.  Large umbilical hernia.  No evidence of obstruction associated with the hernia. Transferred to [facility] for higher level of care and further evaluation and treatment of sepsi and pyelonephritis.

## 2019-09-14 NOTE — PLAN OF CARE
Problem: Adult Inpatient Plan of Care  Goal: Plan of Care Review  Received pt on RA; no distress noted. Will cont to monitor

## 2019-09-14 NOTE — PROGRESS NOTES
[facility] Hospital Medicine  Progress Note    Patient Name: Meghna Dozier  MRN: [id number]  Patient Class: OP- Observation   Admission Date: 9/13/2019  Length of Stay: 0 days  Attending Physician: Lory Lopes*  Primary Care Provider: Hubert Still MD        Subjective:     Principal Problem:Sepsis        HPI:  Meghna Dozier is a 23 year old female with a past medical history of Anemia, Anxiety and Hypertension. She lives in [place], [place]. Her PCP is Dr. Hubert Still.    She presented to [facility] 9/13/2019 with a chief complaint of urinary frequency that began one day prior. Associated symptoms include fever and generalized malaise.  She denies any burning with urination but just increased frequency of urination.  She reports mild lower abdominal pain and low back pain. She denies any vaginal bleeding, discharge or groin rash. She denies any nausea, vomiting, diarrhea or constipation.  She reports that she has a history of diabetes secondary to her PCOS.  She reports that she has not been taking her metformin for several months.  She states that she has not taken her blood glucose levels recently.     ED workup revealed Point of care glucose 92. UPT negative.  Urinalysis reveals trace blood and no evidence of UTI.  Beta hydroxybutyrate 0.2.  CBC reveals WBC 22.47, anemia with hemoglobin 10.2 and hematocrit 33.1.  CMP reveals sodium 134, potassium 3.4, total bilirubin 1.1.  Lactic acid 2.1.  Lipase 74.  CT of the abdomen/pelvis revealed moderate amount of right perinephric stranding.  No hydronephrosis or nephrolithiasis noted.  Moderate amount of haziness adjacent to the proximal portion of the right ureter.  There is no hydroureter or urolithiasis. The appendix appears normal.  Large umbilical hernia.  No evidence of obstruction associated with the hernia. Transferred to [facility] for higher level of care and further evaluation and treatment of  sepsi and pyelonephritis.     Overview/Hospital Course:  Blood and urine cx -NGTD. Continue with Zosyh    Interval History: awake and alert. Sitting up in bed. Reported abdominal pain is better.  Wbc stable. Continue with Zosyn  Blood cx NGTD  Urine cx pending    Review of Systems   Constitutional: Negative for fever.   Respiratory: Negative for cough and shortness of breath.    Cardiovascular: Negative for chest pain and palpitations.   Gastrointestinal: Positive for abdominal pain. Negative for abdominal distention, diarrhea, nausea and vomiting.   Genitourinary: Negative for dysuria, flank pain, frequency and urgency.   Musculoskeletal: Negative for joint swelling.   Skin: Negative for color change and rash.   Neurological: Negative for weakness.   Psychiatric/Behavioral: Negative for agitation.     Objective:     Vital Signs (Most Recent):  Temp: 98 °F (36.7 °C) (09/14/19 0829)  Pulse: 103 (09/14/19 0829)  Resp: 20 (09/14/19 0829)  BP: 122/73 (09/14/19 0829)  SpO2: 98 % (09/14/19 0829) Vital Signs (24h Range):  Temp:  [98 °F (36.7 °C)-103.3 °F (39.6 °C)] 98 °F (36.7 °C)  Pulse:  [] 103  Resp:  [17-22] 20  SpO2:  [97 %-100 %] 98 %  BP: (122-158)/(65-97) 122/73     Weight: 78.8 kg (173 lb 11.2 oz)  Body mass index is 30.77 kg/m².    Intake/Output Summary (Last 24 hours) at 9/14/2019 0858  Last data filed at 9/14/2019 0700  Gross per 24 hour   Intake 3330.83 ml   Output --   Net 3330.83 ml      Physical Exam   Constitutional: She is oriented to person, place, and time. No distress.   HENT:   Head: Normocephalic and atraumatic.   Eyes: Pupils are equal, round, and reactive to light.   Neck: Normal range of motion. No JVD present.   Cardiovascular: Regular rhythm.   Tachycardia   Pulmonary/Chest: Effort normal and breath sounds normal. No respiratory distress.   Abdominal: Soft. Bowel sounds are normal. She exhibits no distension. There is tenderness.   Musculoskeletal: Normal range of motion.   Neurological:  She is alert and oriented to person, place, and time.   Skin: Skin is warm and dry. Capillary refill takes less than 2 seconds. She is not diaphoretic.   Psychiatric: She has a normal mood and affect.       Significant Labs:   Blood Culture:   Recent Labs   Lab 09/13/19  1339   LABBLOO No Growth to date  No Growth to date     CBC:   Recent Labs   Lab 09/13/19  1338 09/14/19  0618   WBC 22.47* 20.53*   HGB 10.2* 8.2*   HCT 33.1* 27.9*   * 320     CMP:   Recent Labs   Lab 09/13/19  1338 09/14/19  0618   * 136   K 3.4* 4.1   CL 98 105   CO2 26 23    93   BUN 13 13   CREATININE 1.25 1.0   CALCIUM 9.5 9.0   PROT 8.7*  --    ALBUMIN 4.3  --    BILITOT 1.1*  --    ALKPHOS 100  --    AST 21  --    ALT 16  --    ANIONGAP 10 8   EGFRNONAA 59.9* >60     Lactic Acid:   Recent Labs   Lab 09/13/19  1338 09/13/19  1807   LACTATE 2.1 1.1     Urine Culture: No results for input(s): LABURIN in the last 48 hours.  Urine Studies:   Recent Labs   Lab 09/13/19  1347   COLORU Straw   APPEARANCEUA Clear   PHUR 5.0   SPECGRAV 1.015   PROTEINUA Negative   GLUCUA Negative   KETONESU Negative   BILIRUBINUA Negative   OCCULTUA Trace*   NITRITE Negative   UROBILINOGEN Negative   LEUKOCYTESUR Negative   RBCUA 1   WBCUA 1   BACTERIA Few*       Significant Imaging: I have reviewed all pertinent imaging results/findings within the past 24 hours.      Assessment/Plan:      * Sepsis  Leukocytosis  Hyponatremia    WBC (22.47), Lactic acid (2.1), Temp 99.0F-103.3F  Continuous cardiac monitoring  Zosyn 4.5 gms IV every 8 hours  Blood cx pending  Gentle hydration, NS 125mg/hr      Anemia  Iron deficiency anemia due to chronic blood loss    H&H stable  Monitor CBC      Hypokalemia  K 3.4, replace orally  Monitor BMP      PCOS (polycystic ovarian syndrome)  Follows Dr. Gilma Mejia, non compliant with Metformin.      Essential hypertension  -158  Continue Amlodipine 10 mg po daily       VTE Risk Mitigation (From admission, onward)         Ordered     enoxaparin injection 40 mg  Daily      09/13/19 2300     IP VTE HIGH RISK PATIENT  Once      09/13/19 2300                Lory N MD Moses  Department of Hospital Medicine   [facility]

## 2019-09-14 NOTE — ED NOTES
[facility] supervisor contacted; states that [id number] will arrive to ED shortly for pt transport.

## 2019-09-14 NOTE — HOSPITAL COURSE
Blood and urine cx -NGTD. Continue with Zosyh  9/15 patient with fever and chills. Vancomycin added on 9/15, wbc improving. Repeat CT concerning for interval worsening right-sided pyelonephritis and potential proximal ureteritis.  No hydronephrosis. Switch Zosyn to Meropenem. ID recommend adding daptomycin in the setting of  Vanc allergy and will need urology consult for ureteritis  9/17 pt seen, have flank pain in the right side will check us to r/o hydronephrosis  9/18 wbc trending down nicely to normal range, it is down to 5 from as high as 20K. The US is unremarkable, pt has no fever. Can be discharged home safely with f/u with PCP in 3 days, on augmentin x 5 days.  Microbiology Results (last 7 days)     Procedure Component Value Units Date/Time    Blood culture [[id number]] Collected:  09/13/19 1339    Order Status:  Completed Specimen:  Blood from Peripheral, Antecubital, Left Updated:  09/18/19 0612     Blood Culture, Routine No Growth to date      No Growth to date      No Growth to date      No Growth to date      No Growth to date    Blood culture [[id number]] Collected:  09/13/19 1339    Order Status:  Completed Specimen:  Blood from Peripheral, Antecubital, Left Updated:  09/18/19 0612     Blood Culture, Routine No Growth to date      No Growth to date      No Growth to date      No Growth to date      No Growth to date    Urine Culture High Risk [[id number]] Collected:  09/16/19 1203    Order Status:  Completed Specimen:  Urine, Clean Catch Updated:  09/17/19 2111     Urine Culture, Routine No significant growth    Narrative:       Indicated criteria for high risk culture:->Other  Other (specify):->peristent fever    Blood culture [[id number]] Collected:  09/14/19 0617    Order Status:  Completed Specimen:  Blood Updated:  09/17/19 1012     Blood Culture, Routine No Growth to date      No Growth to date      No Growth to date      No Growth to date    Urine culture [[id number]] Collected:  09/13/19 4884     Order Status:  Completed Specimen:  Urine, Clean Catch Updated:  09/15/19 0318     Urine Culture, Routine No significant growth    Narrative:       Indicated criteria for high risk culture:->Other  Other (specify):->Sepsis

## 2019-09-14 NOTE — ED NOTES
[facility] supervisor contacted at this time regarding delay in patient transfer. Per supervisor, there are multiple emergencies tying up EMS trucks at this time and there is one truck currently covering the [facility] area. EMS supervisor to contact dispatch and attempted to hasten process. PA and pts primary RN notified.

## 2019-09-14 NOTE — SUBJECTIVE & OBJECTIVE
Interval History: awake and alert. Sitting up in bed. Reported abdominal pain is better.  Wbc stable. Continue with Zosyn  Blood cx NGTD  Urine cx pending    Review of Systems   Constitutional: Negative for fever.   Respiratory: Negative for cough and shortness of breath.    Cardiovascular: Negative for chest pain and palpitations.   Gastrointestinal: Positive for abdominal pain. Negative for abdominal distention, diarrhea, nausea and vomiting.   Genitourinary: Negative for dysuria, flank pain, frequency and urgency.   Musculoskeletal: Negative for joint swelling.   Skin: Negative for color change and rash.   Neurological: Negative for weakness.   Psychiatric/Behavioral: Negative for agitation.     Objective:     Vital Signs (Most Recent):  Temp: 98 °F (36.7 °C) (09/14/19 0829)  Pulse: 103 (09/14/19 0829)  Resp: 20 (09/14/19 0829)  BP: 122/73 (09/14/19 0829)  SpO2: 98 % (09/14/19 0829) Vital Signs (24h Range):  Temp:  [98 °F (36.7 °C)-103.3 °F (39.6 °C)] 98 °F (36.7 °C)  Pulse:  [] 103  Resp:  [17-22] 20  SpO2:  [97 %-100 %] 98 %  BP: (122-158)/(65-97) 122/73     Weight: 78.8 kg (173 lb 11.2 oz)  Body mass index is 30.77 kg/m².    Intake/Output Summary (Last 24 hours) at 9/14/2019 0858  Last data filed at 9/14/2019 0700  Gross per 24 hour   Intake 3330.83 ml   Output --   Net 3330.83 ml      Physical Exam   Constitutional: She is oriented to person, place, and time. No distress.   HENT:   Head: Normocephalic and atraumatic.   Eyes: Pupils are equal, round, and reactive to light.   Neck: Normal range of motion. No JVD present.   Cardiovascular: Regular rhythm.   Tachycardia   Pulmonary/Chest: Effort normal and breath sounds normal. No respiratory distress.   Abdominal: Soft. Bowel sounds are normal. She exhibits no distension. There is tenderness.   Musculoskeletal: Normal range of motion.   Neurological: She is alert and oriented to person, place, and time.   Skin: Skin is warm and dry. Capillary refill takes  less than 2 seconds. She is not diaphoretic.   Psychiatric: She has a normal mood and affect.       Significant Labs:   Blood Culture:   Recent Labs   Lab 09/13/19  1339   LABBLOO No Growth to date  No Growth to date     CBC:   Recent Labs   Lab 09/13/19 1338 09/14/19  0618   WBC 22.47* 20.53*   HGB 10.2* 8.2*   HCT 33.1* 27.9*   * 320     CMP:   Recent Labs   Lab 09/13/19 1338 09/14/19  0618   * 136   K 3.4* 4.1   CL 98 105   CO2 26 23    93   BUN 13 13   CREATININE 1.25 1.0   CALCIUM 9.5 9.0   PROT 8.7*  --    ALBUMIN 4.3  --    BILITOT 1.1*  --    ALKPHOS 100  --    AST 21  --    ALT 16  --    ANIONGAP 10 8   EGFRNONAA 59.9* >60     Lactic Acid:   Recent Labs   Lab 09/13/19  1338 09/13/19  1807   LACTATE 2.1 1.1     Urine Culture: No results for input(s): LABURIN in the last 48 hours.  Urine Studies:   Recent Labs   Lab 09/13/19  1347   COLORU Straw   APPEARANCEUA Clear   PHUR 5.0   SPECGRAV 1.015   PROTEINUA Negative   GLUCUA Negative   KETONESU Negative   BILIRUBINUA Negative   OCCULTUA Trace*   NITRITE Negative   UROBILINOGEN Negative   LEUKOCYTESUR Negative   RBCUA 1   WBCUA 1   BACTERIA Few*       Significant Imaging: I have reviewed all pertinent imaging results/findings within the past 24 hours.

## 2019-09-14 NOTE — NURSING
Orders noted for fluids & vanc. To be given IV. Inquired about zosyn already running & vanc. Started 2nd IV for fluids. MD ordered Zosyn to be run over 2 hours then vanc to run after.

## 2019-09-14 NOTE — NURSING
Pt c/o pain and shivering. LluviaMICHAEL willettN gave pain medication per MAR. Severe sepsis alert on screen. Notified Dr. Chapman of alert & vital signs. Ari Zepeda ordered to re-check heart rate. -125. MD notified.

## 2019-09-14 NOTE — PLAN OF CARE
09/14/19 1603   Discharge Assessment   Assessment Type Discharge Planning Assessment   Confirmed/corrected address and phone number on facesheet? Yes   Assessment information obtained from? Patient   Prior to hospitilization cognitive status: Alert/Oriented   Prior to hospitalization functional status: Independent   Current cognitive status: Alert/Oriented   Current Functional Status: Independent   Facility Arrived From: [facility]   Lives With parent(s)   Able to Return to Prior Arrangements yes   Is patient able to care for self after discharge? Yes   Who are your caregiver(s) and their phone number(s)? Manuel Dozier(father)[phone]   Patient's perception of discharge disposition home or selfcare   Readmission Within the Last 30 Days no previous admission in last 30 days   Patient currently being followed by outpatient case management? No   Patient currently receives any other outside agency services? No   Equipment Currently Used at Home none   Do you have any problems affording any of your prescribed medications? No   Is the patient taking medications as prescribed? no   If no, which medications is patient not taking? Metformin   Does the patient have transportation home? Yes   Transportation Anticipated family or friend will provide   Does the patient receive services at the [facility]? No   Discharge Plan A Home with family   Discharge Plan B Home   DME Needed Upon Discharge  none   Patient/Family in Agreement with Plan yes        09/14/19 1603   Discharge Assessment   Assessment Type Discharge Planning Assessment   Confirmed/corrected address and phone number on facesheet? Yes   Assessment information obtained from? Patient   Prior to hospitilization cognitive status: Alert/Oriented   Prior to hospitalization functional status: Independent   Current cognitive status: Alert/Oriented   Current Functional Status: Independent   Facility Arrived From: [facility]   Lives With parent(s)   Able to  Return to Prior Arrangements yes   Is patient able to care for self after discharge? Yes   Who are your caregiver(s) and their phone number(s)? Manuel Dozier(father)[phone]   Patient's perception of discharge disposition home or selfcare   Readmission Within the Last 30 Days no previous admission in last 30 days   Patient currently being followed by outpatient case management? No   Patient currently receives any other outside agency services? No   Equipment Currently Used at Home none   Do you have any problems affording any of your prescribed medications? No   Is the patient taking medications as prescribed? no   If no, which medications is patient not taking? Metformin   Does the patient have transportation home? Yes   Transportation Anticipated family or friend will provide   Does the patient receive services at the [facility]? No   Discharge Plan A Home with family   Discharge Plan B Home   DME Needed Upon Discharge  none   Patient/Family in Agreement with Plan yes   Principal Problem:Sepsis     Chief Complaint:        Chief Complaint   Patient presents with    Urinary Frequency       PT reports frequent urination, body aches and chills that started yesterday. Pt denies pain or burning with urination. Uknown fever         HPI: Meghna Dozier is a 23 year old female with a past medical history of Anemia, Anxiety and Hypertension. She lives in [place], [place]. Her PCP is Dr. Hubert Still.     She presented to [facility] 9/13/2019 with a chief complaint of urinary frequency that began one day prior. Associated symptoms include fever and generalized malaise.  She denies any burning with urination but just increased frequency of urination.  She reports mild lower abdominal pain and low back pain. She denies any vaginal bleeding, discharge or groin rash. She denies any nausea, vomiting, diarrhea or constipation.  She reports that she has a history of diabetes secondary to her PCOS.  She reports that  she has not been taking her metformin for several months.  She states that she has not taken her blood glucose levels recently.      ED workup revealed Point of care glucose 92. UPT negative.  Urinalysis reveals trace blood and no evidence of UTI.  Beta hydroxybutyrate 0.2.  CBC reveals WBC 22.47, anemia with hemoglobin 10.2 and hematocrit 33.1.  CMP reveals sodium 134, potassium 3.4, total bilirubin 1.1.  Lactic acid 2.1.  Lipase 74.  CT of the abdomen/pelvis revealed moderate amount of right perinephric stranding.  No hydronephrosis or nephrolithiasis noted.  Moderate amount of haziness adjacent to the proximal portion of the right ureter.  There is no hydroureter or urolithiasis. The appendix appears normal.  Large umbilical hernia.  No evidence of obstruction associated with the hernia. Transferred to [facility] for higher level of care and further evaluation and treatment of sepsi and pyelonephritis.      The Sw met with the pt to complete the assessment. The pt was transferred from [facility]. The pt lives with her parents and she's independent with all ad's. The pt has transportation home at d/c. The pt was in a lot of pain and crying during the assessment but the Sw alerted the pt's nurse. The Sw wrote her contact info on the white board and left her a d/c brochure. The Sw encouraged the pt to call if she has any questions or concerns.

## 2019-09-15 PROBLEM — N30.00 ACUTE CYSTITIS: Status: ACTIVE | Noted: 2019-09-15

## 2019-09-15 LAB
ANION GAP SERPL CALC-SCNC: 8 MMOL/L (ref 8–16)
ANISOCYTOSIS BLD QL SMEAR: SLIGHT
BACTERIA UR CULT: NORMAL
BASOPHILS # BLD AUTO: 0.02 K/UL (ref 0–0.2)
BASOPHILS NFR BLD: 0.1 % (ref 0–1.9)
BUN SERPL-MCNC: 9 MG/DL (ref 6–20)
CALCIUM SERPL-MCNC: 9.2 MG/DL (ref 8.7–10.5)
CHLORIDE SERPL-SCNC: 102 MMOL/L (ref 95–110)
CO2 SERPL-SCNC: 25 MMOL/L (ref 23–29)
CREAT SERPL-MCNC: 0.9 MG/DL (ref 0.5–1.4)
DIFFERENTIAL METHOD: ABNORMAL
EOSINOPHIL # BLD AUTO: 0.1 K/UL (ref 0–0.5)
EOSINOPHIL NFR BLD: 0.4 % (ref 0–8)
ERYTHROCYTE [DISTWIDTH] IN BLOOD BY AUTOMATED COUNT: 19.3 % (ref 11.5–14.5)
EST. GFR  (AFRICAN AMERICAN): >60 ML/MIN/1.73 M^2
EST. GFR  (NON AFRICAN AMERICAN): >60 ML/MIN/1.73 M^2
GLUCOSE SERPL-MCNC: 83 MG/DL (ref 70–110)
HCT VFR BLD AUTO: 29.9 % (ref 37–48.5)
HGB BLD-MCNC: 8.9 G/DL (ref 12–16)
HYPOCHROMIA BLD QL SMEAR: ABNORMAL
LYMPHOCYTES # BLD AUTO: 1.6 K/UL (ref 1–4.8)
LYMPHOCYTES NFR BLD: 12 % (ref 18–48)
MCH RBC QN AUTO: 20 PG (ref 27–31)
MCHC RBC AUTO-ENTMCNC: 29.8 G/DL (ref 32–36)
MCV RBC AUTO: 67 FL (ref 82–98)
MONOCYTES # BLD AUTO: 1.4 K/UL (ref 0.3–1)
MONOCYTES NFR BLD: 10.5 % (ref 4–15)
NEUTROPHILS # BLD AUTO: 10.4 K/UL (ref 1.8–7.7)
NEUTROPHILS NFR BLD: 77 % (ref 38–73)
PLATELET # BLD AUTO: 275 K/UL (ref 150–350)
PLATELET BLD QL SMEAR: ABNORMAL
PMV BLD AUTO: 10.1 FL (ref 9.2–12.9)
POCT GLUCOSE: 134 MG/DL (ref 70–110)
POIKILOCYTOSIS BLD QL SMEAR: SLIGHT
POLYCHROMASIA BLD QL SMEAR: ABNORMAL
POTASSIUM SERPL-SCNC: 3.8 MMOL/L (ref 3.5–5.1)
RBC # BLD AUTO: 4.46 M/UL (ref 4–5.4)
SODIUM SERPL-SCNC: 135 MMOL/L (ref 136–145)
WBC # BLD AUTO: 13.53 K/UL (ref 3.9–12.7)

## 2019-09-15 PROCEDURE — 36415 COLL VENOUS BLD VENIPUNCTURE: CPT

## 2019-09-15 PROCEDURE — 63600175 PHARM REV CODE 636 W HCPCS: Performed by: NURSE PRACTITIONER

## 2019-09-15 PROCEDURE — 25000003 PHARM REV CODE 250: Performed by: NURSE PRACTITIONER

## 2019-09-15 PROCEDURE — 85025 COMPLETE CBC W/AUTO DIFF WBC: CPT

## 2019-09-15 PROCEDURE — 63600175 PHARM REV CODE 636 W HCPCS: Performed by: FAMILY MEDICINE

## 2019-09-15 PROCEDURE — 11000001 HC ACUTE MED/SURG PRIVATE ROOM

## 2019-09-15 PROCEDURE — 94761 N-INVAS EAR/PLS OXIMETRY MLT: CPT

## 2019-09-15 PROCEDURE — 80048 BASIC METABOLIC PNL TOTAL CA: CPT

## 2019-09-15 PROCEDURE — 25500020 PHARM REV CODE 255: Performed by: FAMILY MEDICINE

## 2019-09-15 RX ORDER — MEROPENEM AND SODIUM CHLORIDE 1 G/50ML
1 INJECTION, SOLUTION INTRAVENOUS
Status: DISCONTINUED | OUTPATIENT
Start: 2019-09-15 | End: 2019-09-15

## 2019-09-15 RX ORDER — ACETAMINOPHEN 500 MG
1000 TABLET ORAL EVERY 6 HOURS PRN
Status: DISCONTINUED | OUTPATIENT
Start: 2019-09-15 | End: 2019-09-18 | Stop reason: HOSPADM

## 2019-09-15 RX ADMIN — PIPERACILLIN AND TAZOBACTAM 4.5 G: 4; .5 INJECTION, POWDER, LYOPHILIZED, FOR SOLUTION INTRAVENOUS; PARENTERAL at 06:09

## 2019-09-15 RX ADMIN — MEROPENEM 1 G: 1 INJECTION, POWDER, FOR SOLUTION INTRAVENOUS at 04:09

## 2019-09-15 RX ADMIN — PIPERACILLIN AND TAZOBACTAM 4.5 G: 4; .5 INJECTION, POWDER, LYOPHILIZED, FOR SOLUTION INTRAVENOUS; PARENTERAL at 03:09

## 2019-09-15 RX ADMIN — ACETAMINOPHEN 650 MG: 325 TABLET ORAL at 09:09

## 2019-09-15 RX ADMIN — ENOXAPARIN SODIUM 40 MG: 100 INJECTION SUBCUTANEOUS at 04:09

## 2019-09-15 RX ADMIN — ACETAMINOPHEN 650 MG: 325 TABLET ORAL at 03:09

## 2019-09-15 RX ADMIN — MEROPENEM 1 G: 1 INJECTION, POWDER, FOR SOLUTION INTRAVENOUS at 10:09

## 2019-09-15 RX ADMIN — HYDROCODONE BITARTRATE AND ACETAMINOPHEN 1 TABLET: 5; 325 TABLET ORAL at 04:09

## 2019-09-15 RX ADMIN — IOHEXOL 30 ML: 350 INJECTION, SOLUTION INTRAVENOUS at 11:09

## 2019-09-15 RX ADMIN — IBUPROFEN 400 MG: 400 TABLET, FILM COATED ORAL at 04:09

## 2019-09-15 RX ADMIN — AMLODIPINE BESYLATE 10 MG: 5 TABLET ORAL at 09:09

## 2019-09-15 RX ADMIN — IOHEXOL 75 ML: 350 INJECTION, SOLUTION INTRAVENOUS at 12:09

## 2019-09-15 NOTE — PROGRESS NOTES
Pt arrived from CDU with Vanc going. Shortly after arrival, pt called with complaints of itching and lip swelling. Vanc stopped and Chairs, NP notified. 25 mg Benadryl IV ordered and administered. /75, HR 96, Temp 97.9, RR 18, O2 sats 100%. Pt does not complain of SOB or trouble breathing. Will continue to monitor.

## 2019-09-15 NOTE — SUBJECTIVE & OBJECTIVE
Interval History: awake and alert. Reported chills overnight and now R flank pain, RLQ pain is resolved.   Wbc improving continue with Zosyn and vanc pending urine and blood culture- NGTD      Review of Systems   Constitutional: Negative for fever.   Respiratory: Negative for cough and shortness of breath.    Cardiovascular: Negative for chest pain and palpitations.   Gastrointestinal: Negative for abdominal distention, abdominal pain, diarrhea, nausea and vomiting.   Genitourinary: Positive for flank pain. Negative for dysuria, frequency and urgency.   Musculoskeletal: Negative for joint swelling.   Skin: Negative for color change and rash.   Neurological: Negative for weakness.   Psychiatric/Behavioral: Negative for agitation.     Objective:     Vital Signs (Most Recent):  Temp: 99.1 °F (37.3 °C) (09/15/19 0324)  Pulse: 109 (09/15/19 0748)  Resp: 16 (09/15/19 0748)  BP: 134/71 (09/15/19 0324)  SpO2: 99 % (09/15/19 0748) Vital Signs (24h Range):  Temp:  [97.9 °F (36.6 °C)-103.2 °F (39.6 °C)] 99.1 °F (37.3 °C)  Pulse:  [] 109  Resp:  [16-20] 16  SpO2:  [96 %-100 %] 99 %  BP: (111-134)/(54-75) 134/71     Weight: 76.1 kg (167 lb 12.3 oz)  Body mass index is 29.72 kg/m².    Intake/Output Summary (Last 24 hours) at 9/15/2019 0818  Last data filed at 9/14/2019 2316  Gross per 24 hour   Intake 1217.5 ml   Output --   Net 1217.5 ml      Physical Exam   Constitutional: She is oriented to person, place, and time. No distress.   HENT:   Head: Normocephalic and atraumatic.   Eyes: Pupils are equal, round, and reactive to light.   Neck: Normal range of motion. No JVD present.   Cardiovascular: Regular rhythm.   Tachycardia   Pulmonary/Chest: Effort normal and breath sounds normal. No respiratory distress.   Abdominal: Soft. Bowel sounds are normal. She exhibits no distension. There is no tenderness. There is CVA tenderness.   R flank tenderness   Musculoskeletal: Normal range of motion.   Neurological: She is alert and  oriented to person, place, and time.   Skin: Skin is warm and dry. Capillary refill takes less than 2 seconds. She is not diaphoretic.   Psychiatric: She has a normal mood and affect.       Significant Labs:   Blood Culture:   Recent Labs   Lab 09/13/19 1339 09/14/19  0617   LABBLOO No Growth to date  No Growth to date  No Growth to date  No Growth to date No Growth to date     CBC:   Recent Labs   Lab 09/13/19  1338 09/14/19  0618 09/15/19  0437   WBC 22.47* 20.53* 13.53*   HGB 10.2* 8.2* 8.9*   HCT 33.1* 27.9* 29.9*   * 320 275     CMP:   Recent Labs   Lab 09/13/19  1338 09/14/19  0618 09/15/19  0437   * 136 135*   K 3.4* 4.1 3.8   CL 98 105 102   CO2 26 23 25    93 83   BUN 13 13 9   CREATININE 1.25 1.0 0.9   CALCIUM 9.5 9.0 9.2   PROT 8.7*  --   --    ALBUMIN 4.3  --   --    BILITOT 1.1*  --   --    ALKPHOS 100  --   --    AST 21  --   --    ALT 16  --   --    ANIONGAP 10 8 8   EGFRNONAA 59.9* >60 >60     Lactic Acid:   Recent Labs   Lab 09/13/19 1338 09/13/19  1807   LACTATE 2.1 1.1     Urine Culture:   Recent Labs   Lab 09/13/19  1649   LABURIN No significant growth     Urine Studies:   Recent Labs   Lab 09/13/19  1347   COLORU Straw   APPEARANCEUA Clear   PHUR 5.0   SPECGRAV 1.015   PROTEINUA Negative   GLUCUA Negative   KETONESU Negative   BILIRUBINUA Negative   OCCULTUA Trace*   NITRITE Negative   UROBILINOGEN Negative   LEUKOCYTESUR Negative   RBCUA 1   WBCUA 1   BACTERIA Few*       Significant Imaging: I have reviewed all pertinent imaging results/findings within the past 24 hours.

## 2019-09-15 NOTE — NURSING
Notified Dr. Chapman, Temp. 103.5 administered tylenol. Rechecked Temp. An hour later T. 103.8 administered Ibuprofen and Ice packs. Will continue to monitor.

## 2019-09-15 NOTE — PLAN OF CARE
Problem: Adult Inpatient Plan of Care  Goal: Plan of Care Review  Outcome: Ongoing (interventions implemented as appropriate)  Plan of care reviewed patient and mother verbalized understanding. Medications administered. IV NS infusing at 100 ml/hr. IV antibiotics infused. CT of the abdomen done pending results. Temp. Increased tylenol administered and ice packs. Safety maintained bed in the lowest position, call bell within reach, bed alarm on.

## 2019-09-15 NOTE — PLAN OF CARE
Problem: Adult Inpatient Plan of Care  Goal: Plan of Care Review  Outcome: Ongoing (interventions implemented as appropriate)  Plan of care reviewed with patient, understanding verbalized. Pt remains SR on tele. No complaints of pain throughout shift. NaCl @ 150 maintained. Iv abx administered. Mom remains at bedside. Instructed to call for any assistance, understanding verbalized. Bed alarm on, call light in reach, fall precautions in place. Will continue to monitor.

## 2019-09-15 NOTE — PROGRESS NOTES
VN cued into patient's room with patient's permission. Patient AAOx4 and sitting up in bed. Patient denies any pain at this time. IVF infusing noted. POC reviewed with patient to continue IV antibiotics and IVF. Patient verbalizes clear understanding. Patient denies any needs/concerns at this time. Fall risk protocol discussed with patient. VN instructed patient to call for assistance. Patient aware and agreeable. NAD noted. Allowed time for questions. Will continue to be available and intervene as needed.        09/15/19 1208   Type of Frequent Check   Type Patient Rounds  (VN rounds)   Safety/Activity   Patient Rounds bed in low position;bed wheels locked;call light in patient/parent reach;clutter free environment maintained;placement of personal items at bedside;visualized patient   Safety Promotion/Fall Prevention assistive device/personal item within reach;bed alarm set;Fall Risk reviewed with patient/family;Fall Risk signage in place;side rails raised x 2;instructed to call staff for mobility   Positioning   Body Position sitting up in bed   Pain/Comfort/Sleep   Preferred Pain Scale number (Numeric Rating Pain Scale)   Comfort/Acceptable Pain Level 5   Pain Rating (0-10): Rest 0   Pain Rating (0-10): Activity 0   Sleep/Rest/Relaxation no problem identified;awake   Assessments (Pre/Post)   Level of Consciousness (AVPU) alert

## 2019-09-15 NOTE — ASSESSMENT & PLAN NOTE
Urinary tract infection  Leukocytosis  Hyponatremia    WBC (22.47), Lactic acid (2.1), Temp 99.0F-103.3F  Continuous cardiac monitoring  Zosyn 4.5 gms IV every 8 hours  Blood and urine cx NGTD  Continue IVF   Add vanc

## 2019-09-15 NOTE — PROGRESS NOTES
[facility] Hospital Medicine  Progress Note    Patient Name: Meghna Dozier  MRN: [id number]  Patient Class: IP- Inpatient   Admission Date: 9/13/2019  Length of Stay: 1 days  Attending Physician: Lory Lopes*  Primary Care Provider: Hubert Still MD        Subjective:     Principal Problem:Sepsis        HPI:  Meghna Dozier is a 23 year old female with a past medical history of Anemia, Anxiety and Hypertension. She lives in [place], [place]. Her PCP is Dr. Hubert Still.    She presented to [facility] 9/13/2019 with a chief complaint of urinary frequency that began one day prior. Associated symptoms include fever and generalized malaise.  She denies any burning with urination but just increased frequency of urination.  She reports mild lower abdominal pain and low back pain. She denies any vaginal bleeding, discharge or groin rash. She denies any nausea, vomiting, diarrhea or constipation.  She reports that she has a history of diabetes secondary to her PCOS.  She reports that she has not been taking her metformin for several months.  She states that she has not taken her blood glucose levels recently.     ED workup revealed Point of care glucose 92. UPT negative.  Urinalysis reveals trace blood and no evidence of UTI.  Beta hydroxybutyrate 0.2.  CBC reveals WBC 22.47, anemia with hemoglobin 10.2 and hematocrit 33.1.  CMP reveals sodium 134, potassium 3.4, total bilirubin 1.1.  Lactic acid 2.1.  Lipase 74.  CT of the abdomen/pelvis revealed moderate amount of right perinephric stranding.  No hydronephrosis or nephrolithiasis noted.  Moderate amount of haziness adjacent to the proximal portion of the right ureter.  There is no hydroureter or urolithiasis. The appendix appears normal.  Large umbilical hernia.  No evidence of obstruction associated with the hernia. Transferred to [facility] for higher level of care and further evaluation and treatment of sepsi  and pyelonephritis.     Overview/Hospital Course:  Blood and urine cx -NGTD. Continue with Zosyh  9/15 patient with fever and chills. Vancomycin added on 9/15, wbc improving    Interval History: awake and alert. Reported chills overnight and now R flank pain, RLQ pain is resolved.   Wbc improving continue with Zosyn and vanc pending urine and blood culture- NGTD      Review of Systems   Constitutional: Negative for fever.   Respiratory: Negative for cough and shortness of breath.    Cardiovascular: Negative for chest pain and palpitations.   Gastrointestinal: Negative for abdominal distention, abdominal pain, diarrhea, nausea and vomiting.   Genitourinary: Positive for flank pain. Negative for dysuria, frequency and urgency.   Musculoskeletal: Negative for joint swelling.   Skin: Negative for color change and rash.   Neurological: Negative for weakness.   Psychiatric/Behavioral: Negative for agitation.     Objective:     Vital Signs (Most Recent):  Temp: 99.1 °F (37.3 °C) (09/15/19 0324)  Pulse: 109 (09/15/19 0748)  Resp: 16 (09/15/19 0748)  BP: 134/71 (09/15/19 0324)  SpO2: 99 % (09/15/19 0748) Vital Signs (24h Range):  Temp:  [97.9 °F (36.6 °C)-103.2 °F (39.6 °C)] 99.1 °F (37.3 °C)  Pulse:  [] 109  Resp:  [16-20] 16  SpO2:  [96 %-100 %] 99 %  BP: (111-134)/(54-75) 134/71     Weight: 76.1 kg (167 lb 12.3 oz)  Body mass index is 29.72 kg/m².    Intake/Output Summary (Last 24 hours) at 9/15/2019 0818  Last data filed at 9/14/2019 2316  Gross per 24 hour   Intake 1217.5 ml   Output --   Net 1217.5 ml      Physical Exam   Constitutional: She is oriented to person, place, and time. No distress.   HENT:   Head: Normocephalic and atraumatic.   Eyes: Pupils are equal, round, and reactive to light.   Neck: Normal range of motion. No JVD present.   Cardiovascular: Regular rhythm.   Tachycardia   Pulmonary/Chest: Effort normal and breath sounds normal. No respiratory distress.   Abdominal: Soft. Bowel sounds are normal.  She exhibits no distension. There is no tenderness. There is CVA tenderness.   R flank tenderness   Musculoskeletal: Normal range of motion.   Neurological: She is alert and oriented to person, place, and time.   Skin: Skin is warm and dry. Capillary refill takes less than 2 seconds. She is not diaphoretic.   Psychiatric: She has a normal mood and affect.       Significant Labs:   Blood Culture:   Recent Labs   Lab 09/13/19 1339 09/14/19  0617   LABBLOO No Growth to date  No Growth to date  No Growth to date  No Growth to date No Growth to date     CBC:   Recent Labs   Lab 09/13/19 1338 09/14/19 0618 09/15/19  0437   WBC 22.47* 20.53* 13.53*   HGB 10.2* 8.2* 8.9*   HCT 33.1* 27.9* 29.9*   * 320 275     CMP:   Recent Labs   Lab 09/13/19 1338 09/14/19 0618 09/15/19  0437   * 136 135*   K 3.4* 4.1 3.8   CL 98 105 102   CO2 26 23 25    93 83   BUN 13 13 9   CREATININE 1.25 1.0 0.9   CALCIUM 9.5 9.0 9.2   PROT 8.7*  --   --    ALBUMIN 4.3  --   --    BILITOT 1.1*  --   --    ALKPHOS 100  --   --    AST 21  --   --    ALT 16  --   --    ANIONGAP 10 8 8   EGFRNONAA 59.9* >60 >60     Lactic Acid:   Recent Labs   Lab 09/13/19 1338 09/13/19  1807   LACTATE 2.1 1.1     Urine Culture:   Recent Labs   Lab 09/13/19  1649   LABURIN No significant growth     Urine Studies:   Recent Labs   Lab 09/13/19  1347   COLORU Straw   APPEARANCEUA Clear   PHUR 5.0   SPECGRAV 1.015   PROTEINUA Negative   GLUCUA Negative   KETONESU Negative   BILIRUBINUA Negative   OCCULTUA Trace*   NITRITE Negative   UROBILINOGEN Negative   LEUKOCYTESUR Negative   RBCUA 1   WBCUA 1   BACTERIA Few*       Significant Imaging: I have reviewed all pertinent imaging results/findings within the past 24 hours.      Assessment/Plan:      * Sepsis  Urinary tract infection  Leukocytosis  Hyponatremia    WBC (22.47), Lactic acid (2.1), Temp 99.0F-103.3F  Continuous cardiac monitoring  Zosyn 4.5 gms IV every 8 hours  Blood and urine cx  NGTD  Continue IVF   Add vanc     Anemia  Iron deficiency anemia due to chronic blood loss    H&H stable  Monitor CBC      Hypokalemia  K 3.4, replace orally  Monitor BMP      PCOS (polycystic ovarian syndrome)  Follows Dr. Gilma Mejia, non compliant with Metformin.      Essential hypertension  -158  Continue Amlodipine 10 mg po daily       VTE Risk Mitigation (From admission, onward)        Ordered     enoxaparin injection 40 mg  Daily      09/13/19 2300     IP VTE HIGH RISK PATIENT  Once      09/13/19 2300                Lory Lopes MD  Department of Hospital Medicine   [facility]

## 2019-09-15 NOTE — NURSING
VN noted that patient has a temp of 102 orally. Bedside nurse notified. Nurse states that she will be administering PRN Tylenol.

## 2019-09-16 LAB
ANION GAP SERPL CALC-SCNC: 8 MMOL/L (ref 8–16)
ANISOCYTOSIS BLD QL SMEAR: SLIGHT
BASOPHILS # BLD AUTO: 0.01 K/UL (ref 0–0.2)
BASOPHILS NFR BLD: 0.1 % (ref 0–1.9)
BUN SERPL-MCNC: 7 MG/DL (ref 6–20)
CALCIUM SERPL-MCNC: 9.1 MG/DL (ref 8.7–10.5)
CHLORIDE SERPL-SCNC: 104 MMOL/L (ref 95–110)
CO2 SERPL-SCNC: 25 MMOL/L (ref 23–29)
CREAT SERPL-MCNC: 0.8 MG/DL (ref 0.5–1.4)
DIFFERENTIAL METHOD: ABNORMAL
EOSINOPHIL # BLD AUTO: 0 K/UL (ref 0–0.5)
EOSINOPHIL NFR BLD: 0.4 % (ref 0–8)
ERYTHROCYTE [DISTWIDTH] IN BLOOD BY AUTOMATED COUNT: 19.3 % (ref 11.5–14.5)
EST. GFR  (AFRICAN AMERICAN): >60 ML/MIN/1.73 M^2
EST. GFR  (NON AFRICAN AMERICAN): >60 ML/MIN/1.73 M^2
GLUCOSE SERPL-MCNC: 108 MG/DL (ref 70–110)
HCT VFR BLD AUTO: 27.4 % (ref 37–48.5)
HGB BLD-MCNC: 8.3 G/DL (ref 12–16)
HYPOCHROMIA BLD QL SMEAR: ABNORMAL
LYMPHOCYTES # BLD AUTO: 0.8 K/UL (ref 1–4.8)
LYMPHOCYTES NFR BLD: 8.9 % (ref 18–48)
MCH RBC QN AUTO: 19.9 PG (ref 27–31)
MCHC RBC AUTO-ENTMCNC: 30.3 G/DL (ref 32–36)
MCV RBC AUTO: 66 FL (ref 82–98)
MONOCYTES # BLD AUTO: 1.5 K/UL (ref 0.3–1)
MONOCYTES NFR BLD: 16.5 % (ref 4–15)
NEUTROPHILS # BLD AUTO: 6.8 K/UL (ref 1.8–7.7)
NEUTROPHILS NFR BLD: 74.1 % (ref 38–73)
PLATELET # BLD AUTO: 334 K/UL (ref 150–350)
PLATELET BLD QL SMEAR: ABNORMAL
PMV BLD AUTO: 10.5 FL (ref 9.2–12.9)
POCT GLUCOSE: 100 MG/DL (ref 70–110)
POCT GLUCOSE: 110 MG/DL (ref 70–110)
POCT GLUCOSE: 145 MG/DL (ref 70–110)
POCT GLUCOSE: 84 MG/DL (ref 70–110)
POIKILOCYTOSIS BLD QL SMEAR: SLIGHT
POLYCHROMASIA BLD QL SMEAR: ABNORMAL
POTASSIUM SERPL-SCNC: 3.4 MMOL/L (ref 3.5–5.1)
RBC # BLD AUTO: 4.17 M/UL (ref 4–5.4)
SODIUM SERPL-SCNC: 137 MMOL/L (ref 136–145)
VANCOMYCIN TROUGH SERPL-MCNC: <1.1 UG/ML (ref 10–22)
WBC # BLD AUTO: 9.23 K/UL (ref 3.9–12.7)

## 2019-09-16 PROCEDURE — 36415 COLL VENOUS BLD VENIPUNCTURE: CPT

## 2019-09-16 PROCEDURE — 63600175 PHARM REV CODE 636 W HCPCS: Performed by: FAMILY MEDICINE

## 2019-09-16 PROCEDURE — 80048 BASIC METABOLIC PNL TOTAL CA: CPT

## 2019-09-16 PROCEDURE — 80202 ASSAY OF VANCOMYCIN: CPT

## 2019-09-16 PROCEDURE — 25000003 PHARM REV CODE 250: Performed by: NURSE PRACTITIONER

## 2019-09-16 PROCEDURE — 63600175 PHARM REV CODE 636 W HCPCS: Performed by: NURSE PRACTITIONER

## 2019-09-16 PROCEDURE — 85025 COMPLETE CBC W/AUTO DIFF WBC: CPT

## 2019-09-16 PROCEDURE — 63600175 PHARM REV CODE 636 W HCPCS: Mod: JG | Performed by: STUDENT IN AN ORGANIZED HEALTH CARE EDUCATION/TRAINING PROGRAM

## 2019-09-16 PROCEDURE — 94761 N-INVAS EAR/PLS OXIMETRY MLT: CPT

## 2019-09-16 PROCEDURE — 25000003 PHARM REV CODE 250: Performed by: FAMILY MEDICINE

## 2019-09-16 PROCEDURE — 11000001 HC ACUTE MED/SURG PRIVATE ROOM

## 2019-09-16 PROCEDURE — 87086 URINE CULTURE/COLONY COUNT: CPT

## 2019-09-16 RX ADMIN — ENOXAPARIN SODIUM 40 MG: 100 INJECTION SUBCUTANEOUS at 04:09

## 2019-09-16 RX ADMIN — ACETAMINOPHEN 1000 MG: 500 TABLET ORAL at 05:09

## 2019-09-16 RX ADMIN — Medication 1 G: at 05:09

## 2019-09-16 RX ADMIN — DAPTOMYCIN 455 MG: 350 INJECTION, POWDER, LYOPHILIZED, FOR SOLUTION INTRAVENOUS at 06:09

## 2019-09-16 RX ADMIN — AMLODIPINE BESYLATE 10 MG: 5 TABLET ORAL at 08:09

## 2019-09-16 RX ADMIN — MEROPENEM 1 G: 1 INJECTION, POWDER, FOR SOLUTION INTRAVENOUS at 10:09

## 2019-09-16 RX ADMIN — MEROPENEM 1 G: 1 INJECTION, POWDER, FOR SOLUTION INTRAVENOUS at 04:09

## 2019-09-16 NOTE — PROGRESS NOTES
Pharmacy New Medication Education    Patient and/or Caregiver ACCEPTED medication education.    Pharmacy has provided education on the name, indication, and possible side effects of the medication(s) prescribed, using teach-back method.     Learners of pharmacy medication education includes:  patient    Medication Indication Side Effects   Norco,ibuprofen pain constipation and GI distress   merrem uti rash and diarrhea       The following medications have also been discussed, during this admission.     Current Facility-Administered Medications   Medication Frequency    0.9%  NaCl infusion Continuous    acetaminophen tablet 1,000 mg Q6H PRN    amLODIPine tablet 10 mg Daily    dextrose 10% (D10W) Bolus PRN    dextrose 10% (D10W) Bolus PRN    enoxaparin injection 40 mg Daily    glucagon (human recombinant) injection 1 mg PRN    glucose chewable tablet 16 g PRN    glucose chewable tablet 24 g PRN    HYDROcodone-acetaminophen 5-325 mg per tablet 1 tablet Q6H PRN    ibuprofen tablet 400 mg Q4H PRN    meropenem 1 g in sodium chloride 0.9 % 100 mL IVPB (ready to mix system) Q6H    ondansetron disintegrating tablet 8 mg Q8H PRN    potassium chloride 10% oral solution 40 mEq PRN    sodium chloride 0.9% flush 10 mL PRN          Thank you  Sergo Wolfe, PharmD

## 2019-09-16 NOTE — SUBJECTIVE & OBJECTIVE
Interval History: awake and alert. Persistent fever,     Wbc improved, urine and blood culture- NGTD  Patient started with Zosyn, with persistent fever, Add Vanc but had allergic reaction. Now on Meropenem.  ID recommend adding daptomycin in the setting of  Vanc allergy and will need urology consult for ureteritis    Review of Systems   Constitutional: Negative for fever.   Respiratory: Negative for cough and shortness of breath.    Cardiovascular: Negative for chest pain and palpitations.   Gastrointestinal: Negative for abdominal distention, abdominal pain, diarrhea, nausea and vomiting.   Genitourinary: Positive for flank pain. Negative for dysuria, frequency and urgency.   Musculoskeletal: Negative for joint swelling.   Skin: Negative for color change and rash.   Neurological: Negative for weakness.   Psychiatric/Behavioral: Negative for agitation.     Objective:     Vital Signs (Most Recent):  Temp: (!) 100.4 °F (38 °C) (09/16/19 0800)  Pulse: 106 (09/16/19 0800)  Resp: 18 (09/16/19 0800)  BP: 131/82 (09/16/19 0800)  SpO2: 97 % (09/16/19 0842) Vital Signs (24h Range):  Temp:  [98.4 °F (36.9 °C)-103.8 °F (39.9 °C)] 100.4 °F (38 °C)  Pulse:  [] 106  Resp:  [18-19] 18  SpO2:  [97 %-100 %] 97 %  BP: (116-140)/(55-82) 131/82     Weight: 76.1 kg (167 lb 12.3 oz)  Body mass index is 29.72 kg/m².    Intake/Output Summary (Last 24 hours) at 9/16/2019 0952  Last data filed at 9/16/2019 0500  Gross per 24 hour   Intake 1200 ml   Output 1550 ml   Net -350 ml      Physical Exam   Constitutional: She is oriented to person, place, and time. No distress.   HENT:   Head: Normocephalic and atraumatic.   Eyes: Pupils are equal, round, and reactive to light.   Neck: Normal range of motion. No JVD present.   Cardiovascular: Regular rhythm.   Tachycardia   Pulmonary/Chest: Effort normal and breath sounds normal. No respiratory distress.   Abdominal: Soft. Bowel sounds are normal. She exhibits no distension. There is no  tenderness. There is CVA tenderness.   R flank tenderness   Musculoskeletal: Normal range of motion.   Neurological: She is alert and oriented to person, place, and time.   Skin: Skin is warm and dry. Capillary refill takes less than 2 seconds. She is not diaphoretic.   Psychiatric: She has a normal mood and affect.       Significant Labs:   Blood Culture:   No results for input(s): LABBLOO in the last 48 hours.  CBC:   Recent Labs   Lab 09/15/19  0437 09/16/19  0628   WBC 13.53* 9.23   HGB 8.9* 8.3*   HCT 29.9* 27.4*    334     CMP:   Recent Labs   Lab 09/15/19  0437 09/16/19  0628   * 137   K 3.8 3.4*    104   CO2 25 25   GLU 83 108   BUN 9 7   CREATININE 0.9 0.8   CALCIUM 9.2 9.1   ANIONGAP 8 8   EGFRNONAA >60 >60     Lactic Acid:   No results for input(s): LACTATE in the last 48 hours.  Urine Culture:   No results for input(s): LABURIN in the last 48 hours.  Urine Studies:   No results for input(s): COLORU, APPEARANCEUA, PHUR, SPECGRAV, PROTEINUA, GLUCUA, KETONESU, BILIRUBINUA, OCCULTUA, NITRITE, UROBILINOGEN, LEUKOCYTESUR, RBCUA, WBCUA, BACTERIA, SQUAMEPITHEL, HYALINECASTS in the last 48 hours.    Invalid input(s): LANNY    Significant Imaging: I have reviewed all pertinent imaging results/findings within the past 24 hours.

## 2019-09-16 NOTE — PLAN OF CARE
Patient remains in hospital  No dc needs identified at this time       09/16/19 1133   Discharge Reassessment   Assessment Type Discharge Planning Reassessment   Provided patient/caregiver education on the expected discharge date and the discharge plan Yes   Discharge Plan A Home with family;Home     Dian Solo RN, CCM, CMSRN  RN Transition Navigator  [phone]

## 2019-09-16 NOTE — CONSULTS
Infectious Disease Consult Note    Consulting Physician: Innocent-Ituah    Reason for Consult: Antibiotic management    Chief Complaint: Chills, body aches and cold all the time.    History of Present Illness:  Ms. Dozier is a 23 y.o. lady with past medical history of PCOS, Anemia, Hypertension, Diabetes, and Anxiety who presented on 9/13 with suspected pyelonephritis and sepsis. She was feeling well until 9/12 when she started having chills, shakes, body aches and dizziness. She did not take her temperature at home but was very cold at work and at home. She also noticed increased urinary urgency and frequency, stating she would suddenly feel the urge to urinate, but could not make it to the bathroom in time. On 9/13 she started having right flank pain, so she presented to [facility], who then transferred her to [facility] for further work-up and management. 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 She also reports she has not been taking her metformin. She has not taken any antibiotics prior to coming to hospital either.    Review of Symptoms:  Constitutional: Reported chills and body aches.  Eyes: Denies changes in vision.  ENT: Denies dysphagia  Cardiovascular: Denies chest pain, palpitations  Respiratory: Denies shortness of breath, cough  GI: Denies nausea/vomitting,  Did report decrease in appetite.  : Denies dysuria, incontinence, or hematuria. Reports frequency and urgency  Musculoskeletal: Denies joint pain. Positive body aches.  Skin/breast: Denies rashes,  Neurologic: Denies headache, reported dizziness    Past Medical History:  Past Medical History:   Diagnosis Date    Anemia     Anxiety     Hypertension    Diabetes - Off meds    Past Surgical History:  History reviewed. No pertinent surgical history.    Family History:  Family History   Problem Relation Age of Onset    Hypertension Father     Diabetes Father     Hypertension Mother          Social  History:  Social History     Socioeconomic History    Marital status: Single     Spouse name: Not on file    Number of children: Not on file    Years of education: Not on file    Highest education level: Not on file   Occupational History    Not on file   Social Needs    Financial resource strain: Not on file    Food insecurity:     Worry: Not on file     Inability: Not on file    Transportation needs:     Medical: Not on file     Non-medical: Not on file   Tobacco Use    Smoking status: Never Smoker    Smokeless tobacco: Never Used   Substance and Sexual Activity    Alcohol use: No    Drug use: No    Sexual activity: Yes     Partners: Male   Lifestyle    Physical activity:     Days per week: Not on file     Minutes per session: Not on file    Stress: Not on file   Relationships    Social connections:     Talks on phone: Not on file     Gets together: Not on file     Attends [employer]ism service: Not on file     Active member of club or organization: Not on file     Attends meetings of clubs or organizations: Not on file     Relationship status: Not on file   Other Topics Concern    Not on file   Social History Narrative    Not on file       Allergies:  Review of patient's allergies indicates:   Allergen Reactions    Vancomycin analogues Itching     Swollen lips        Pertinent Medications:  Antibiotics:   Antibiotics (From admission, onward)    Start     Stop Route Frequency Ordered    09/16/19 1845  meropenem 1 g in sodium chloride 0.9 % 100 mL IVPB (ready to mix system)      -- IV Every 8 hours (non-standard times) 09/16/19 1601    09/16/19 1800  DAPTOmycin (CUBICIN) 455 mg in sodium chloride 0.9% IVPB      -- IV Every 24 hours (non-standard times) 09/16/19 1641          Physical Exam:  VS (24h):   Vitals:    09/16/19 1755   BP:    Pulse:    Resp:    Temp: (!) 102.3 °F (39.1 °C)     Temp:  [98.4 °F (36.9 °C)-102.7 °F (39.3 °C)]       General: Ill-appearing but not septic appearing, she is febrile  and mildly uncomfortable but alert and in no acute distress.   HEENT: MARLENE. EOMI, no scleral icterus. No sinus tenderness.  Pulmonary: Non labored,clear to auscultation A/P/L. No wheezing, crackles, or rhonchi.  Cardiac: normal S1 & S2 w/o rubs/murmurs/gallops. No JVD.   Back - Significant Right CVA tenderness with minimal palpation  Abdominal: Slightly tender right mid abdominal area,Umbilical hernia on CT but not protruding (Mother at bedside states she had a huge hernia as a baby but they did not know it was still there.  non-distended.Bowel sounds present x 4. No appreciable hepatosplenomegaly.  Extremities: Moves all extremities x 4. No peripheral edema. 2+ pulses.  Skin: No jaundice, rashes, or visible lesions.  Neuro:  Alert and oriented x 4.  CN II-XII grossly intact, sensation intact x 4.     Lines: Left upper extremity peripheral IV        Labs:  CBC:   Lab Results   Component Value Date    WBC 9.23 09/16/2019    WBC 13.53 (H) 09/15/2019    WBC 20.53 (H) 09/14/2019    WBC 22.47 (H) 09/13/2019    WBC 7.04 01/09/2019    HGB 8.3 (L) 09/16/2019    HCT 27.4 (L) 09/16/2019    MCV 66 (L) 09/16/2019     09/16/2019       BMP:   Recent Labs   Lab 09/16/19  0628         K 3.4*      CO2 25   BUN 7   CREATININE 0.8   CALCIUM 9.1       LFT:   Lab Results   Component Value Date    ALT 16 09/13/2019    AST 21 09/13/2019    ALKPHOS 100 09/13/2019    BILITOT 1.1 (H) 09/13/2019         Microbiology x 7d:   Microbiology Results (last 7 days)     Procedure Component Value Units Date/Time    Urine Culture High Risk [[id number]] Collected:  09/16/19 1203    Order Status:  Sent Specimen:  Urine, Clean Catch Updated:  09/16/19 1647    Blood culture [[id number]] Collected:  09/14/19 0617    Order Status:  Completed Specimen:  Blood Updated:  09/16/19 1012     Blood Culture, Routine No Growth to date      No Growth to date      No Growth to date    Blood culture [[id number]] Collected:  09/13/19 1339    Order  Status:  Completed Specimen:  Blood from Peripheral, Antecubital, Left Updated:  09/16/19 0612     Blood Culture, Routine No Growth to date      No Growth to date      No Growth to date    Blood culture [[id number]] Collected:  09/13/19 1339    Order Status:  Completed Specimen:  Blood from Peripheral, Antecubital, Left Updated:  09/16/19 0612     Blood Culture, Routine No Growth to date      No Growth to date      No Growth to date    Urine culture [[id number]] Collected:  09/13/19 1649    Order Status:  Completed Specimen:  Urine, Clean Catch Updated:  09/15/19 0318     Urine Culture, Routine No significant growth    Narrative:       Indicated criteria for high risk culture:->Other  Other (specify):->Sepsis            Imaging:    CT Abdomen With Contrast   (9/13/19):   1. There is a moderate amount of right perinephric stranding. There is no hydronephrosis or nephrolithiasis. There is a moderate amount of haziness adjacent to the proximal portion of the right ureter. There is no hydroureter or urolithiasis.  A urinary tract infection cannot be excluded.  2. The appendix is normal in appearance.  3. There is a large umbilical hernia. The width of the mouth of this hernia measures 32 mm. There are loops of small bowel that extend into this hernia. There is no evidence of obstruction associated with this hernia.  All CT scans at this facility use dose modulation, iterative reconstruction, and/or weight base dosing when appropriate to reduce radiation dose when appropriate to reduce radiation dose to as low as reasonably achievable.     (9/15/19):   1. Findings concerning for interval worsening right-sided pyelonephritis and potential proximal ureteritis.  No hydronephrosis.  2. Grossly stable few additional findings as above.      Assessment and Plan:    1. Right Pyelonephritis   * Significant findings on CT that are worsening but no perinephric or kidney abscess at this time   * Not responding to broad gram negative  therapy with Meropenem. Some gram positives are covered but will add specific gram positive coverage.   * Unfortunately, she is allergic to Vancomycin so will begin Daptomycin that will cover enterococcal species and staph including MRSA   * Continue Meropenem   * Please consult urology - Will be on board already if she continues to be unresponsive to antibiotics alone and needs surgical intervention.   * Surprising and unfortunate that both blood and urine cultures are negative from [facility] prior to antibiotics.    * U/A is not remarkable either - very strange so maybe but unlikely to all be upper tract infection at this point.   * Fever continues to spike to over 103   * WBC has come down from 22,000 to 9,200 today however    2. Proximal Right Ureteritis   * Consult Urology   * Watch for phlegmon, abscess formation or medullary necrosis   * Rpt scan or obtain ultrasound tomorrow or next day depending on fever and clinical state.    3. Will follow.      Josefina Patterson MD  [facility] Infectious Diseases Staff

## 2019-09-16 NOTE — PLAN OF CARE
Problem: Adult Inpatient Plan of Care  Goal: Plan of Care Review  Outcome: Ongoing (interventions implemented as appropriate)  Patient is awake and orientedx4. Care plan explained to patient; she verbalized understanding. On room air, O2 saturation at 100%. Hooked to heart monitor running sinus tachycardia. Assisted patient to the bathroom, she said she was dizzy while standing. No complaints of pain/n/v/d. Due medications given. Accuchcecks completed. Encouraged to turn every 2 hours as tolerated. Maintained on fall risk precaution. Bed in lowest position, bed alarm on, call light/personal items within reach and instructed to call for help when needed. Will continue to monitor.

## 2019-09-16 NOTE — PLAN OF CARE
Problem: Adult Inpatient Plan of Care  Goal: Plan of Care Review  Outcome: Ongoing (interventions implemented as appropriate)  Plan of care reviewed patient and mother verbalized understanding. Medications administered. IV NS infusing at 100 ml/hr. IV antibiotics infused. Cardiac monitoring Sinus Tachycardia. Safety maintained bed in the lowest position, call bell within reach, bed alarm on.

## 2019-09-16 NOTE — PROGRESS NOTES
[facility] Hospital Medicine  Progress Note    Patient Name: Meghna Dozier  MRN: [id number]  Patient Class: IP- Inpatient   Admission Date: 9/13/2019  Length of Stay: 2 days  Attending Physician: Lory Lopes*  Primary Care Provider: Hubert Still MD        Subjective:     Principal Problem:Sepsis        HPI:  Meghna Dozier is a 23 year old female with a past medical history of Anemia, Anxiety and Hypertension. She lives in [place], [place]. Her PCP is Dr. Hubert Still.    She presented to [facility] 9/13/2019 with a chief complaint of urinary frequency that began one day prior. Associated symptoms include fever and generalized malaise.  She denies any burning with urination but just increased frequency of urination.  She reports mild lower abdominal pain and low back pain. She denies any vaginal bleeding, discharge or groin rash. She denies any nausea, vomiting, diarrhea or constipation.  She reports that she has a history of diabetes secondary to her PCOS.  She reports that she has not been taking her metformin for several months.  She states that she has not taken her blood glucose levels recently.     ED workup revealed Point of care glucose 92. UPT negative.  Urinalysis reveals trace blood and no evidence of UTI.  Beta hydroxybutyrate 0.2.  CBC reveals WBC 22.47, anemia with hemoglobin 10.2 and hematocrit 33.1.  CMP reveals sodium 134, potassium 3.4, total bilirubin 1.1.  Lactic acid 2.1.  Lipase 74.  CT of the abdomen/pelvis revealed moderate amount of right perinephric stranding.  No hydronephrosis or nephrolithiasis noted.  Moderate amount of haziness adjacent to the proximal portion of the right ureter.  There is no hydroureter or urolithiasis. The appendix appears normal.  Large umbilical hernia.  No evidence of obstruction associated with the hernia. Transferred to [facility] for higher level of care and further evaluation and treatment of sepsi  and pyelonephritis.     Overview/Hospital Course:  Blood and urine cx -NGTD. Continue with Zosyh  9/15 patient with fever and chills. Vancomycin added on 9/15, wbc improving. Repeat CT concerning for interval worsening right-sided pyelonephritis and potential proximal ureteritis.  No hydronephrosis. Switch Zosyn to Meropenem. ID recommend adding daptomycin in the setting of  Vanc allergy and will need urology consult for ureteritis    Interval History: awake and alert. Persistent fever,     Wbc improved, urine and blood culture- NGTD  Patient started with Zosyn, with persistent fever, Add Vanc but had allergic reaction. Now on Meropenem.  ID recommend adding daptomycin in the setting of  Vanc allergy and will need urology consult for ureteritis    Review of Systems   Constitutional: Negative for fever.   Respiratory: Negative for cough and shortness of breath.    Cardiovascular: Negative for chest pain and palpitations.   Gastrointestinal: Negative for abdominal distention, abdominal pain, diarrhea, nausea and vomiting.   Genitourinary: Positive for flank pain. Negative for dysuria, frequency and urgency.   Musculoskeletal: Negative for joint swelling.   Skin: Negative for color change and rash.   Neurological: Negative for weakness.   Psychiatric/Behavioral: Negative for agitation.     Objective:     Vital Signs (Most Recent):  Temp: (!) 100.4 °F (38 °C) (09/16/19 0800)  Pulse: 106 (09/16/19 0800)  Resp: 18 (09/16/19 0800)  BP: 131/82 (09/16/19 0800)  SpO2: 97 % (09/16/19 0842) Vital Signs (24h Range):  Temp:  [98.4 °F (36.9 °C)-103.8 °F (39.9 °C)] 100.4 °F (38 °C)  Pulse:  [] 106  Resp:  [18-19] 18  SpO2:  [97 %-100 %] 97 %  BP: (116-140)/(55-82) 131/82     Weight: 76.1 kg (167 lb 12.3 oz)  Body mass index is 29.72 kg/m².    Intake/Output Summary (Last 24 hours) at 9/16/2019 0939  Last data filed at 9/16/2019 0500  Gross per 24 hour   Intake 1200 ml   Output 1550 ml   Net -350 ml      Physical Exam    Constitutional: She is oriented to person, place, and time. No distress.   HENT:   Head: Normocephalic and atraumatic.   Eyes: Pupils are equal, round, and reactive to light.   Neck: Normal range of motion. No JVD present.   Cardiovascular: Regular rhythm.   Tachycardia   Pulmonary/Chest: Effort normal and breath sounds normal. No respiratory distress.   Abdominal: Soft. Bowel sounds are normal. She exhibits no distension. There is no tenderness. There is CVA tenderness.   R flank tenderness   Musculoskeletal: Normal range of motion.   Neurological: She is alert and oriented to person, place, and time.   Skin: Skin is warm and dry. Capillary refill takes less than 2 seconds. She is not diaphoretic.   Psychiatric: She has a normal mood and affect.       Significant Labs:   Blood Culture:   No results for input(s): LABBLOO in the last 48 hours.  CBC:   Recent Labs   Lab 09/15/19  0437 09/16/19  0628   WBC 13.53* 9.23   HGB 8.9* 8.3*   HCT 29.9* 27.4*    334     CMP:   Recent Labs   Lab 09/15/19  0437 09/16/19  0628   * 137   K 3.8 3.4*    104   CO2 25 25   GLU 83 108   BUN 9 7   CREATININE 0.9 0.8   CALCIUM 9.2 9.1   ANIONGAP 8 8   EGFRNONAA >60 >60     Lactic Acid:   No results for input(s): LACTATE in the last 48 hours.  Urine Culture:   No results for input(s): LABURIN in the last 48 hours.  Urine Studies:   No results for input(s): COLORU, APPEARANCEUA, PHUR, SPECGRAV, PROTEINUA, GLUCUA, KETONESU, BILIRUBINUA, OCCULTUA, NITRITE, UROBILINOGEN, LEUKOCYTESUR, RBCUA, WBCUA, BACTERIA, SQUAMEPITHEL, HYALINECASTS in the last 48 hours.    Invalid input(s): WRIGHTSUR    Significant Imaging: I have reviewed all pertinent imaging results/findings within the past 24 hours.      Assessment/Plan:      * Sepsis  Urinary tract infection  Leukocytosis  Hyponatremia    WBC (22.47), Lactic acid (2.1), Temp 99.0F-103.3F  Continuous cardiac monitoring  Zosyn 4.5 gms IV every 8 hours  Blood and urine cx  NGTD  Continue IVF   Add vanc ID- d/c due to allergy  Repeat CT abdomen reported worsening right-sided pyelonephritis with possible ureteritis.     Consult ID- recommend adding daptomycin in the setting of  Vanc allergy and will need urology consult for ureteritis and Urology consult             Anemia  Iron deficiency anemia due to chronic blood loss    H&H stable  Monitor CBC      Hypokalemia  K 3.4, replace orally  Monitor BMP      PCOS (polycystic ovarian syndrome)  Follows Dr. Gilma Mejia, non compliant with Metformin.      Essential hypertension  -158  Continue Amlodipine 10 mg po daily       VTE Risk Mitigation (From admission, onward)        Ordered     enoxaparin injection 40 mg  Daily      09/13/19 2300     IP VTE HIGH RISK PATIENT  Once      09/13/19 2300                Lory Lopes MD  Department of Hospital Medicine   [facility]

## 2019-09-16 NOTE — MEDICAL/APP STUDENT
Consult Note  Infectious Disease    Consult Requested By: Lory Lopes*    Reason for Consult: Antibiotics management for Sepsis    SUBJECTIVE:     History of Present Illness:  Ms. Dozier is a 23 y.o.  female with past medical history of PCOS, Anemia, Hypertension, Diabetes, and Anxiety who presented on 9/13 with suspected pyelonephritis and sepsis. She reports she was in her usual state of health until 9/12 when she started having chills, shakes, body aches and dizziness. She did not take her temperature at home. She also noticed increased urinary urgency and frequency, stating she would suddenly feel the urge to urinate, but could not make it to the bathroom in time. On 9/13 she started having right flank pain, so she presented to [facility], who then transferred her to [facility] for further work-up and management. 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 She also reports she has not been taking her metformin.     This morning she reports continued right flank pain and decreased appetite. She states she is not eating at this time, but drinking water. She reports continued urinary frequency. She also reports one episode of diarrhea this morning, stating it was soft, non-watery in character. She denies nausea, vomiting, dysuria, hematuria, cough, SOB, abdominal pain, leg cramps or swelling.       Past Medical History:   - Polycystic Ovarian Syndrome (PCOS)  - Hypertension   - Diabetes   - Anxiety         History reviewed. No pertinent surgical history.  Family History   Problem Relation Age of Onset    Hypertension Father     Diabetes Father     Hypertension Mother      Social History     Tobacco Use    Smoking status: Never Smoker    Smokeless tobacco: Never Used   Substance Use Topics    Alcohol use: No    Drug use: No       Review of patient's allergies indicates:   Allergen Reactions    Vancomycin analogues Itching     Swollen lips          Antibiotics (From admission, onward)    Start     Stop Route Frequency Ordered    09/15/19 1630  meropenem 1 g in sodium chloride 0.9 % 100 mL IVPB (ready to mix system)      -- IV Every 6 hours (non-standard times) 09/15/19 1630          Review of Systems:  Constitutional: Chills, Fever, Decreased appetite, Generalized body aches   Respiratory: no cough or shorness of breath  Cardiovascular: no chest pain or palpitations  Gastrointestinal: Diarrhea. no nausea or vomiting, no abdominal pain  Genitourinary: Right flank pain. Urinary urgency and frequency. No dysuria   All other systems reviewed and are negative.     OBJECTIVE:     Vital Signs (Most Recent)  Temp: (!) 100.4 °F (38 °C) (09/16/19 0800)  Pulse: 106 (09/16/19 0800)  Resp: 18 (09/16/19 0800)  BP: 131/82 (09/16/19 0800)  SpO2: 97 % (09/16/19 0842)    Temperature Range Min/Max (Last 24H):  Temp:  [98.4 °F (36.9 °C)-103.8 °F (39.9 °C)]     Physical Exam:  General: Awake, lying in bed, in no apparent distress  Head: Normocephalic, atraumatic  CV: Mild tachycardia, regular rhythm. No murmur, rubs, or gallops appreciated  Lungs: Clear to auscultation bilaterally, normal respiratory effort   Abdomen: Soft, non-tender non-distented; no masses, no organomegaly  Back: Right CVA tenderness. No left CVA tenderness   Extremities: No cyanosis or edema, or clubbing    Laboratory:  Recent Labs   Lab 09/13/19  1338 09/14/19  0618 09/15/19  0437 09/16/19  0628   WBC 22.47* 20.53* 13.53* 9.23   HGB 10.2* 8.2* 8.9* 8.3*   HCT 33.1* 27.9* 29.9* 27.4*   * 320 275 334   * 136 135* 137   K 3.4* 4.1 3.8 3.4*   CL 98 105 102 104   CO2 26 23 25 25   BUN 13 13 9 7   CREATININE 1.25 1.0 0.9 0.8   AST 21  --   --   --    ALT 16  --   --   --    ALKPHOS 100  --   --   --    BILITOT 1.1*  --   --   --        Recent Labs   Lab 09/13/19  1347   COLORU Straw   SPECGRAV 1.015   PHUR 5.0   PROTEINUA Negative   BACTERIA Few*   NITRITE Negative   LEUKOCYTESUR Negative    UROBILINOGEN Negative       Microbiology Results (last 7 days)     Procedure Component Value Units Date/Time    Blood culture [[id number]] Collected:  09/14/19 0617    Order Status:  Completed Specimen:  Blood Updated:  09/16/19 1012     Blood Culture, Routine No Growth to date      No Growth to date      No Growth to date    Urine Culture High Risk [[id number]]     Order Status:  No result Specimen:  Urine     Blood culture [[id number]] Collected:  09/13/19 1339    Order Status:  Completed Specimen:  Blood from Peripheral, Antecubital, Left Updated:  09/16/19 0612     Blood Culture, Routine No Growth to date      No Growth to date      No Growth to date    Blood culture [[id number]] Collected:  09/13/19 1339    Order Status:  Completed Specimen:  Blood from Peripheral, Antecubital, Left Updated:  09/16/19 0612     Blood Culture, Routine No Growth to date      No Growth to date      No Growth to date    Urine culture [[id number]] Collected:  09/13/19 1649    Order Status:  Completed Specimen:  Urine, Clean Catch Updated:  09/15/19 0318     Urine Culture, Routine No significant growth    Narrative:       Indicated criteria for high risk culture:->Other  Other (specify):->Sepsis          Radiology:  CT Abdomen With Contrast   (9/13/19):   1. There is a moderate amount of right perinephric stranding. There is no hydronephrosis or nephrolithiasis. There is a moderate amount of haziness adjacent to the proximal portion of the right ureter. There is no hydroureter or urolithiasis.  A urinary tract infection cannot be excluded.  2. The appendix is normal in appearance.  3. There is a large umbilical hernia. The width of the mouth of this hernia measures 32 mm. There are loops of small bowel that extend into this hernia. There is no evidence of obstruction associated with this hernia.  All CT scans at this facility use dose modulation, iterative reconstruction, and/or weight base dosing when appropriate to reduce radiation  dose when appropriate to reduce radiation dose to as low as reasonably achievable.    (9/15/19):   1. Findings concerning for interval worsening right-sided pyelonephritis and potential proximal ureteritis.  No hydronephrosis.  2. Grossly stable few additional findings as above.    ASSESSMENT/PLAN:     Active Hospital Problems    Diagnosis  POA    *Sepsis [A41.9]  Yes    Acute cystitis [N30.00]  Yes    Leukocytosis [D72.829]  Yes    Hyponatremia [E87.1]  Yes    Hypokalemia [E87.6]  Yes    Anemia [D64.9]  Yes    Essential hypertension [I10]  Yes    PCOS (polycystic ovarian syndrome) [E28.2]  Yes      Resolved Hospital Problems   No resolved problems to display.       - Continues to spike a fever, but trending down.   - Blood cultures and urine cultures continue to be negative.   - Currently on broad-spectrum coverage with Meropenem. Recommend continuing at this time with the addition of Daptomycin 6mg/kg daily for possible MRSA coverage in setting of Vancomycin allergy  - CT concerning for worsening right-sided pyelonephritis with possible ureteritis. Recommend Urology consult         Thank you for the consult. Will continue to follow at this time.    Katherin Limon, MS3  Infectious Disease

## 2019-09-16 NOTE — ASSESSMENT & PLAN NOTE
Urinary tract infection  Leukocytosis  Hyponatremia    WBC (22.47), Lactic acid (2.1), Temp 99.0F-103.3F  Continuous cardiac monitoring  Zosyn 4.5 gms IV every 8 hours  Blood and urine cx NGTD  Continue IVF   Add vanc ID- d/c due to allergy  Repeat CT abdomen reported worsening right-sided pyelonephritis with possible ureteritis.     Consult ID- recommend adding daptomycin in the setting of  Vanc allergy and will need urology consult for ureteritis and Urology consult

## 2019-09-17 LAB
ANION GAP SERPL CALC-SCNC: 8 MMOL/L (ref 8–16)
ANISOCYTOSIS BLD QL SMEAR: SLIGHT
BACTERIA UR CULT: NORMAL
BASOPHILS # BLD AUTO: 0.01 K/UL (ref 0–0.2)
BASOPHILS NFR BLD: 0.1 % (ref 0–1.9)
BUN SERPL-MCNC: 7 MG/DL (ref 6–20)
CALCIUM SERPL-MCNC: 9.2 MG/DL (ref 8.7–10.5)
CHLORIDE SERPL-SCNC: 104 MMOL/L (ref 95–110)
CO2 SERPL-SCNC: 26 MMOL/L (ref 23–29)
CREAT SERPL-MCNC: 0.8 MG/DL (ref 0.5–1.4)
DIFFERENTIAL METHOD: ABNORMAL
EOSINOPHIL # BLD AUTO: 0.1 K/UL (ref 0–0.5)
EOSINOPHIL NFR BLD: 1.6 % (ref 0–8)
ERYTHROCYTE [DISTWIDTH] IN BLOOD BY AUTOMATED COUNT: 19.3 % (ref 11.5–14.5)
EST. GFR  (AFRICAN AMERICAN): >60 ML/MIN/1.73 M^2
EST. GFR  (NON AFRICAN AMERICAN): >60 ML/MIN/1.73 M^2
GLUCOSE SERPL-MCNC: 98 MG/DL (ref 70–110)
HCT VFR BLD AUTO: 28.2 % (ref 37–48.5)
HGB BLD-MCNC: 8.4 G/DL (ref 12–16)
HYPOCHROMIA BLD QL SMEAR: ABNORMAL
LYMPHOCYTES # BLD AUTO: 1.7 K/UL (ref 1–4.8)
LYMPHOCYTES NFR BLD: 25.4 % (ref 18–48)
MCH RBC QN AUTO: 19.7 PG (ref 27–31)
MCHC RBC AUTO-ENTMCNC: 29.8 G/DL (ref 32–36)
MCV RBC AUTO: 66 FL (ref 82–98)
MONOCYTES # BLD AUTO: 1.5 K/UL (ref 0.3–1)
MONOCYTES NFR BLD: 21.5 % (ref 4–15)
NEUTROPHILS # BLD AUTO: 3.4 K/UL (ref 1.8–7.7)
NEUTROPHILS NFR BLD: 51.4 % (ref 38–73)
PLATELET # BLD AUTO: 345 K/UL (ref 150–350)
PLATELET BLD QL SMEAR: ABNORMAL
PMV BLD AUTO: 10.1 FL (ref 9.2–12.9)
POCT GLUCOSE: 164 MG/DL (ref 70–110)
POCT GLUCOSE: 76 MG/DL (ref 70–110)
POCT GLUCOSE: 89 MG/DL (ref 70–110)
POIKILOCYTOSIS BLD QL SMEAR: SLIGHT
POLYCHROMASIA BLD QL SMEAR: ABNORMAL
POTASSIUM SERPL-SCNC: 3.1 MMOL/L (ref 3.5–5.1)
RBC # BLD AUTO: 4.26 M/UL (ref 4–5.4)
SODIUM SERPL-SCNC: 138 MMOL/L (ref 136–145)
WBC # BLD AUTO: 6.74 K/UL (ref 3.9–12.7)

## 2019-09-17 PROCEDURE — 80048 BASIC METABOLIC PNL TOTAL CA: CPT

## 2019-09-17 PROCEDURE — 63600175 PHARM REV CODE 636 W HCPCS: Performed by: NURSE PRACTITIONER

## 2019-09-17 PROCEDURE — 85025 COMPLETE CBC W/AUTO DIFF WBC: CPT

## 2019-09-17 PROCEDURE — 11000001 HC ACUTE MED/SURG PRIVATE ROOM

## 2019-09-17 PROCEDURE — 25000003 PHARM REV CODE 250: Performed by: HOSPITALIST

## 2019-09-17 PROCEDURE — 63600175 PHARM REV CODE 636 W HCPCS: Mod: JG | Performed by: STUDENT IN AN ORGANIZED HEALTH CARE EDUCATION/TRAINING PROGRAM

## 2019-09-17 PROCEDURE — 94761 N-INVAS EAR/PLS OXIMETRY MLT: CPT

## 2019-09-17 PROCEDURE — 63600175 PHARM REV CODE 636 W HCPCS: Performed by: FAMILY MEDICINE

## 2019-09-17 PROCEDURE — 25000003 PHARM REV CODE 250: Performed by: NURSE PRACTITIONER

## 2019-09-17 PROCEDURE — 36415 COLL VENOUS BLD VENIPUNCTURE: CPT

## 2019-09-17 RX ORDER — POTASSIUM CHLORIDE 20 MEQ/1
40 TABLET, EXTENDED RELEASE ORAL ONCE
Status: COMPLETED | OUTPATIENT
Start: 2019-09-17 | End: 2019-09-17

## 2019-09-17 RX ADMIN — DAPTOMYCIN 455 MG: 350 INJECTION, POWDER, LYOPHILIZED, FOR SOLUTION INTRAVENOUS at 07:09

## 2019-09-17 RX ADMIN — ENOXAPARIN SODIUM 40 MG: 100 INJECTION SUBCUTANEOUS at 05:09

## 2019-09-17 RX ADMIN — SODIUM CHLORIDE: 0.9 INJECTION, SOLUTION INTRAVENOUS at 06:09

## 2019-09-17 RX ADMIN — AMLODIPINE BESYLATE 10 MG: 5 TABLET ORAL at 09:09

## 2019-09-17 RX ADMIN — Medication 1 G: at 02:09

## 2019-09-17 RX ADMIN — Medication 1 G: at 10:09

## 2019-09-17 RX ADMIN — POTASSIUM CHLORIDE 40 MEQ: 20 TABLET, EXTENDED RELEASE ORAL at 09:09

## 2019-09-17 RX ADMIN — Medication 1 G: at 09:09

## 2019-09-17 NOTE — ASSESSMENT & PLAN NOTE
Right pyelonephritis and right Ureteritis with leukocytosis  24 Yo F with h/o PCOS on metformin not taking, HTN , anemia, anxiety and possible DM transferred from [facility] on 9/13 for sepsis, fever 103, lactate elevated and WBC 22 and pyelo. Blood and urine cx still negative. Leukocytosis resolved though but still spiking fevers after 1 dose of vanco and on pip-tazo 9/13-9/15 and Meropenem 9/15. ID consulted 9/16 and added Daptomycin since allergic to vanco and [facility] urology consult since CT abdmen 9/15 showed right ureteritis with right pyelo, no hydro and no stones. Renal US 9/17 normal.    Currently on Meropenem and Daptomycin. Cx negative. Noted renal ultrasound results    Recommendations  -- Continue Daptomycin 6mg/kg Q24 hrs  -- Cotinue Meropenem 1gm Iv Q8hrs  -- Follow repeat urine cx results

## 2019-09-17 NOTE — SUBJECTIVE & OBJECTIVE
Interval History: awake and alert. Persistent fever,     Wbc improved, urine and blood culture- NGTD  Patient started with Zosyn, with persistent fever, Add Vanc but had allergic reaction. Now on Meropenem.  ID recommend adding daptomycin in the setting of  Vanc allergy and will need urology consult for ureteritis    Review of Systems   Constitutional: Negative for fever.   HENT: Negative for congestion and nosebleeds.    Respiratory: Negative for cough and shortness of breath.    Cardiovascular: Negative for chest pain and palpitations.   Gastrointestinal: Negative for abdominal distention, abdominal pain, diarrhea, nausea and vomiting.   Genitourinary: Positive for flank pain (positive right sided flank pain on plapation). Negative for dysuria, frequency and urgency.   Musculoskeletal: Negative for joint swelling.   Skin: Negative for color change and rash.   Neurological: Negative for weakness.   Psychiatric/Behavioral: Negative for agitation.     Objective:     Vital Signs (Most Recent):  Temp: 99.5 °F (37.5 °C) (09/17/19 1156)  Pulse: 95 (09/17/19 1542)  Resp: 18 (09/17/19 1156)  BP: 139/80 (09/17/19 1156)  SpO2: 99 % (09/17/19 0832) Vital Signs (24h Range):  Temp:  [96.2 °F (35.7 °C)-102.3 °F (39.1 °C)] 99.5 °F (37.5 °C)  Pulse:  [] 95  Resp:  [16-18] 18  SpO2:  [99 %-100 %] 99 %  BP: (128-139)/(77-83) 139/80     Weight: 76.1 kg (167 lb 12.3 oz)  Body mass index is 29.72 kg/m².    Intake/Output Summary (Last 24 hours) at 9/17/2019 1620  Last data filed at 9/17/2019 1300  Gross per 24 hour   Intake 670 ml   Output 500 ml   Net 170 ml      Physical Exam   Constitutional: She is oriented to person, place, and time. No distress.   HENT:   Head: Normocephalic and atraumatic.   Eyes: Pupils are equal, round, and reactive to light.   Neck: Normal range of motion. Neck supple. No JVD present.   Cardiovascular: Regular rhythm.   Tachycardia   Pulmonary/Chest: Effort normal and breath sounds normal. No respiratory  distress.   Abdominal: Soft. Bowel sounds are normal. She exhibits no distension. There is no tenderness. There is CVA tenderness.   R flank tenderness   Musculoskeletal: Normal range of motion.   Neurological: She is alert and oriented to person, place, and time.   Skin: Skin is warm and dry. Capillary refill takes less than 2 seconds. She is not diaphoretic.   Psychiatric: She has a normal mood and affect. Her behavior is normal. Judgment and thought content normal.   Nursing note and vitals reviewed.      Significant Labs:   Recent Labs   Lab 09/15/19  0437 09/16/19  0628 09/17/19  0443   WBC 13.53* 9.23 6.74   HGB 8.9* 8.3* 8.4*   HCT 29.9* 27.4* 28.2*    334 345     Recent Labs   Lab 09/15/19  0437 09/16/19  0628 09/17/19  0444   * 137 138   K 3.8 3.4* 3.1*    104 104   CO2 25 25 26   BUN 9 7 7   CREATININE 0.9 0.8 0.8   GLU 83 108 98   CALCIUM 9.2 9.1 9.2     Recent Labs   Lab 09/13/19  1338   ALKPHOS 100   ALT 16   AST 21   ALBUMIN 4.3   PROT 8.7*   BILITOT 1.1*      No results for input(s): CPK, CPKMB, MB, TROPONINI in the last 72 hours.  Recent Labs   Lab 09/16/19  0526 09/16/19  1115 09/16/19  1636 09/16/19  2105 09/17/19  0501 09/17/19  1103   POCTGLUCOSE 145* 84 110 100 89 76     Microbiology Results (last 7 days)     Procedure Component Value Units Date/Time    Blood culture [[id number]] Collected:  09/14/19 0617    Order Status:  Completed Specimen:  Blood Updated:  09/17/19 1012     Blood Culture, Routine No Growth to date      No Growth to date      No Growth to date      No Growth to date    Blood culture [[id number]] Collected:  09/13/19 1339    Order Status:  Completed Specimen:  Blood from Peripheral, Antecubital, Left Updated:  09/17/19 0612     Blood Culture, Routine No Growth to date      No Growth to date      No Growth to date      No Growth to date    Blood culture [[id number]] Collected:  09/13/19 1339    Order Status:  Completed Specimen:  Blood from Peripheral,  Antecubital, Left Updated:  09/17/19 0612     Blood Culture, Routine No Growth to date      No Growth to date      No Growth to date      No Growth to date    Urine Culture High Risk [[id number]] Collected:  09/16/19 1203    Order Status:  Sent Specimen:  Urine, Clean Catch Updated:  09/16/19 1647    Urine culture [[id number]] Collected:  09/13/19 1649    Order Status:  Completed Specimen:  Urine, Clean Catch Updated:  09/15/19 0318     Urine Culture, Routine No significant growth    Narrative:       Indicated criteria for high risk culture:->Other  Other (specify):->Sepsis            No results found for: HGBA1C  Scheduled Meds:   amLODIPine  10 mg Oral Daily    DAPTOmycin (CUBICIN)  IV  6 mg/kg Intravenous Q24H    enoxparin  40 mg Subcutaneous Daily    meropenem (MERREM) IVPB  1 g Intravenous Q8H     Continuous Infusions:   sodium chloride 0.9% 100 mL/hr at 09/15/19 0824     As Needed: acetaminophen, Dextrose 10% Bolus, Dextrose 10% Bolus, glucagon (human recombinant), glucose, glucose, HYDROcodone-acetaminophen, ibuprofen, ondansetron, potassium chloride 10%, sodium chloride 0.9%      Significant Imaging: I have reviewed all pertinent imaging results/findings within the past 24 hours.

## 2019-09-17 NOTE — PHYSICIAN QUERY
"PT Name: Meghna Dozier  MR #: [id number]     PHYSICIAN QUERY -  ACUITY OF CONDITION CLARIFICATION      CDS: Barbie Araya RN, CCDS         Contact information :[phone]  [email or web address]     This form is a permanent document in the medical record.     Query Date: September 16, 2019    By submitting this query, we are merely seeking further clarification of documentation to reflect the severity of illness of your patient. Please utilize your independent clinical judgment when addressing the question(s) below.    The Medical record reflects the following:     Indicators   Supporting Clinical Findings Location in Medical Record   x Documentation of condition "Transferred to [facility] for higher level of care and further evaluation and treatment of sepsis and pyelonephritis." H&P 9/13/19    Lab Value(s)     x Radiology Findings Findings concerning for interval worsening right-sided pyelonephritis and potential proximal ureteritis.  No hydronephrosis. CT 9/15/19    Treatment                                 Medication     x Other "Sepsis  Leukocytosis  Hyponatremia   WBC (22.47), Lactic acid (2.1), Temp 99.0F-103.3F  Continuous cardiac monitoring  Zosyn 4.5 gms IV every 8 hours  Blood cx pending  Gentle hydration, NS 125mg/hr" H&P 9/13/19     Provider, please specify the acuity/chronicity of _pyelonephritis__:    [  X   ] Acute   [   ] Chronic   [   ] Acute and/on chronic   [   ] Other, ________   [   ]  Clinically Undetermined       Please document in your progress notes daily for the duration of treatment until resolved, and include in your discharge summary.  "

## 2019-09-17 NOTE — PROGRESS NOTES
[facility]  Infectious Disease  Progress Note    Patient Name: Meghna Dozier  MRN: [id number]  Admission Date: 9/13/2019  Length of Stay: 3 days  Attending Physician: Gopal Abrams DO  Primary Care Provider: Hubert Still MD    Isolation Status: No active isolations  Assessment/Plan:      Pyelonephritis  Right pyelonephritis and right Ureteritis with leukocytosis  24 Yo F with h/o PCOS on metformin not taking, HTN , anemia, anxiety and possible DM transferred from [facility] on 9/13 for sepsis, fever 103, lactate elevated and WBC 22 and pyelo. Blood and urine cx still negative. Leukocytosis resolved though but still spiking fevers after 1 dose of vanco and on pip-tazo 9/13-9/15 and Meropenem 9/15. ID consulted 9/16 and added Daptomycin since allergic to vanco and recc urology consult since CT abdmen 9/15 showed right ureteritis with right pyelo, no hydro and no stones. Renal US 9/17 normal.    Currently on Meropenem and Daptomycin. Cx negative. Noted renal ultrasound results    Recommendations  -- Continue Daptomycin 6mg/kg Q24 hrs  -- Cotinue Meropenem 1gm Iv Q8hrs  -- Follow repeat urine cx results    Fever curve improved    Anticipated Disposition: follow fever curve    Thank you for your consult. I will follow-up with patient. Please contact us if you have any additional questions.    Case was discussed with Dr Yesenia Giordano MD  Infectious Disease Fellow  [facility]    Subjective:     Principal Problem:Sepsis    HPI: Ms. Dozier is a 23 y.o. lady with past medical history of PCOS on Metformin, Anemia, Hypertension, ?Diabetes, and Anxiety who presented on 9/13 with suspected pyelonephritis and sepsis. She was feeling well until 9/12 when she started having chills, shakes, body aches and dizziness. She did not take her temperature at home but was very cold at work and at home. She also noticed increased urinary urgency and frequency, stating she would suddenly feel  the urge to urinate, but could not make it to the bathroom in time. On 9/13 she started having right flank pain, so she presented to [facility], who then transferred her to [facility] for further work-up and management. 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 She also reports she has not been taking her metformin. She has not taken any antibiotics prior to coming to hospital either.  ID consulted 9/16 for stil having spikes of fevers on 1 dose vanco and on Pip-tazo from 9/13-9/15. Meropenem started 9/15. Ct Abdomen showed right pyelo no hydro or stone but prox right ureteritis  9/16 ID consulted pt on 9/16 added Dapto and recommended Urology consult  9/17 feels better, still has CVA tenderness, urology recommended renal US that was normal, Tmax 100  Interval History: Tmax noted 100 today, feeling better, still c/o CVA tenderness    Review of Systems   Constitutional: Negative for activity change, appetite change and chills.   HENT: Negative for ear discharge and sinus pain.    Eyes: Negative for discharge.   Respiratory: Negative for apnea, cough, choking and shortness of breath.    Cardiovascular: Negative for chest pain and palpitations.   Gastrointestinal: Positive for diarrhea. Negative for abdominal distention, abdominal pain, constipation, nausea and vomiting.        But mild not watery   Genitourinary: Positive for flank pain. Negative for difficulty urinating, dysuria and urgency.   Musculoskeletal: Negative for back pain and gait problem.   Skin: Negative for rash and wound.   Neurological: Negative for headaches.   Psychiatric/Behavioral: Negative for agitation and behavioral problems.     Objective:     Vital Signs (Most Recent):  Temp: 99.5 °F (37.5 °C) (09/17/19 1156)  Pulse: 95 (09/17/19 1542)  Resp: 18 (09/17/19 1156)  BP: 139/80 (09/17/19 1156)  SpO2: 99 % (09/17/19 0832) Vital Signs (24h Range):  Temp:  [96.2 °F (35.7 °C)-102.3 °F (39.1 °C)] 99.5 °F (37.5  °C)  Pulse:  [] 95  Resp:  [16-18] 18  SpO2:  [99 %-100 %] 99 %  BP: (128-139)/(77-83) 139/80     Weight: 76.1 kg (167 lb 12.3 oz)  Body mass index is 29.72 kg/m².    Estimated Creatinine Clearance: 105 mL/min (based on SCr of 0.8 mg/dL).    Physical Exam   Constitutional: She is oriented to person, place, and time. She appears well-developed. No distress.   HENT:   Head: Normocephalic and atraumatic.   Mouth/Throat: No oropharyngeal exudate.   Eyes: Pupils are equal, round, and reactive to light. EOM are normal. Right eye exhibits no discharge. Left eye exhibits no discharge. No scleral icterus.   Neck: Normal range of motion. Neck supple. No JVD present. No thyromegaly present.   Cardiovascular: Normal rate, regular rhythm, normal heart sounds and intact distal pulses. Exam reveals no friction rub.   No murmur heard.  Pulmonary/Chest: Effort normal and breath sounds normal. No stridor. No respiratory distress. She has no wheezes. She has no rales.   Abdominal: Soft. Bowel sounds are normal. She exhibits no distension. There is tenderness. There is no rebound.   Right CVA tenderness +   Musculoskeletal: Normal range of motion. She exhibits no edema, tenderness or deformity.   Lymphadenopathy:     She has no cervical adenopathy.   Neurological: She is alert and oriented to person, place, and time.   Skin: She is not diaphoretic.   Psychiatric: She has a normal mood and affect. Her behavior is normal.   Nursing note and vitals reviewed.      Significant Labs:   CBC:   Recent Labs   Lab 09/16/19  0628 09/17/19  0443   WBC 9.23 6.74   HGB 8.3* 8.4*   HCT 27.4* 28.2*    345     CMP:   Recent Labs   Lab 09/16/19 0628 09/17/19  0444    138   K 3.4* 3.1*    104   CO2 25 26    98   BUN 7 7   CREATININE 0.8 0.8   CALCIUM 9.1 9.2   ANIONGAP 8 8   EGFRNONAA >60 >60     Microbiology Results (last 7 days)     Procedure Component Value Units Date/Time    Blood culture [[id number]] Collected:   09/14/19 0617    Order Status:  Completed Specimen:  Blood Updated:  09/17/19 1012     Blood Culture, Routine No Growth to date      No Growth to date      No Growth to date      No Growth to date    Blood culture [[id number]] Collected:  09/13/19 1339    Order Status:  Completed Specimen:  Blood from Peripheral, Antecubital, Left Updated:  09/17/19 0612     Blood Culture, Routine No Growth to date      No Growth to date      No Growth to date      No Growth to date    Blood culture [[id number]] Collected:  09/13/19 1339    Order Status:  Completed Specimen:  Blood from Peripheral, Antecubital, Left Updated:  09/17/19 0612     Blood Culture, Routine No Growth to date      No Growth to date      No Growth to date      No Growth to date    Urine Culture High Risk [[id number]] Collected:  09/16/19 1203    Order Status:  Sent Specimen:  Urine, Clean Catch Updated:  09/16/19 1647    Urine culture [[id number]] Collected:  09/13/19 1649    Order Status:  Completed Specimen:  Urine, Clean Catch Updated:  09/15/19 0318     Urine Culture, Routine No significant growth    Narrative:       Indicated criteria for high risk culture:->Other  Other (specify):->Sepsis        All pertinent labs within the past 24 hours have been reviewed.    Significant Imaging: I have reviewed all pertinent imaging results/findings within the past 24 hours.

## 2019-09-17 NOTE — PROGRESS NOTES
Pharmacy New Medication Education    Patient and/or Caregiver ACCEPTED medication education.    Pharmacy has provided education on the name, indication, and possible side effects of the medication(s) prescribed, using teach-back method.     Learners of pharmacy medication education includes:  patient    2Medication Indication Side Effects   daptomycin Kidney infection nausea, rash and             The following medications have also been discussed, during this admission.     Current Facility-Administered Medications   Medication Frequency    0.9%  NaCl infusion Continuous    acetaminophen tablet 1,000 mg Q6H PRN    amLODIPine tablet 10 mg Daily    DAPTOmycin (CUBICIN) 455 mg in sodium chloride 0.9% IVPB Q24H    dextrose 10% (D10W) Bolus PRN    dextrose 10% (D10W) Bolus PRN    enoxaparin injection 40 mg Daily    glucagon (human recombinant) injection 1 mg PRN    glucose chewable tablet 16 g PRN    glucose chewable tablet 24 g PRN    HYDROcodone-acetaminophen 5-325 mg per tablet 1 tablet Q6H PRN    ibuprofen tablet 400 mg Q4H PRN    meropenem 1 g in sodium chloride 0.9 % 100 mL IVPB (ready to mix system) Q8H    ondansetron disintegrating tablet 8 mg Q8H PRN    potassium chloride 10% oral solution 40 mEq PRN    sodium chloride 0.9% flush 10 mL PRN          Thank you  Ginny Mcleod, FelipeD

## 2019-09-17 NOTE — PLAN OF CARE
Problem: Adult Inpatient Plan of Care  Goal: Plan of Care Review  Outcome: Ongoing (interventions implemented as appropriate)  Patient is resting in bed. Awakens to verbal stimuli. Oriented x4. Moves all extremities and follows commands. See flowsheets for details. All lines are intact and fluids are infusing without difficulty. See MAR for gtts. Bed in lowest position. Siderails x3. Mother at bedside. Will continue to monitor

## 2019-09-17 NOTE — SUBJECTIVE & OBJECTIVE
Interval History: Tmax noted 100 today, feeling better, still c/o CVA tenderness    Review of Systems   Constitutional: Negative for activity change, appetite change and chills.   HENT: Negative for ear discharge and sinus pain.    Eyes: Negative for discharge.   Respiratory: Negative for apnea, cough, choking and shortness of breath.    Cardiovascular: Negative for chest pain and palpitations.   Gastrointestinal: Positive for diarrhea. Negative for abdominal distention, abdominal pain, constipation, nausea and vomiting.        But mild not watery   Genitourinary: Positive for flank pain. Negative for difficulty urinating, dysuria and urgency.   Musculoskeletal: Negative for back pain and gait problem.   Skin: Negative for rash and wound.   Neurological: Negative for headaches.   Psychiatric/Behavioral: Negative for agitation and behavioral problems.     Objective:     Vital Signs (Most Recent):  Temp: 99.5 °F (37.5 °C) (09/17/19 1156)  Pulse: 95 (09/17/19 1542)  Resp: 18 (09/17/19 1156)  BP: 139/80 (09/17/19 1156)  SpO2: 99 % (09/17/19 0832) Vital Signs (24h Range):  Temp:  [96.2 °F (35.7 °C)-102.3 °F (39.1 °C)] 99.5 °F (37.5 °C)  Pulse:  [] 95  Resp:  [16-18] 18  SpO2:  [99 %-100 %] 99 %  BP: (128-139)/(77-83) 139/80     Weight: 76.1 kg (167 lb 12.3 oz)  Body mass index is 29.72 kg/m².    Estimated Creatinine Clearance: 105 mL/min (based on SCr of 0.8 mg/dL).    Physical Exam   Constitutional: She is oriented to person, place, and time. She appears well-developed. No distress.   HENT:   Head: Normocephalic and atraumatic.   Mouth/Throat: No oropharyngeal exudate.   Eyes: Pupils are equal, round, and reactive to light. EOM are normal. Right eye exhibits no discharge. Left eye exhibits no discharge. No scleral icterus.   Neck: Normal range of motion. Neck supple. No JVD present. No thyromegaly present.   Cardiovascular: Normal rate, regular rhythm, normal heart sounds and intact distal pulses. Exam reveals no  friction rub.   No murmur heard.  Pulmonary/Chest: Effort normal and breath sounds normal. No stridor. No respiratory distress. She has no wheezes. She has no rales.   Abdominal: Soft. Bowel sounds are normal. She exhibits no distension. There is tenderness. There is no rebound.   Right CVA tenderness +   Musculoskeletal: Normal range of motion. She exhibits no edema, tenderness or deformity.   Lymphadenopathy:     She has no cervical adenopathy.   Neurological: She is alert and oriented to person, place, and time.   Skin: She is not diaphoretic.   Psychiatric: She has a normal mood and affect. Her behavior is normal.   Nursing note and vitals reviewed.      Significant Labs:   CBC:   Recent Labs   Lab 09/16/19 0628 09/17/19 0443   WBC 9.23 6.74   HGB 8.3* 8.4*   HCT 27.4* 28.2*    345     CMP:   Recent Labs   Lab 09/16/19 0628 09/17/19 0444    138   K 3.4* 3.1*    104   CO2 25 26    98   BUN 7 7   CREATININE 0.8 0.8   CALCIUM 9.1 9.2   ANIONGAP 8 8   EGFRNONAA >60 >60     Microbiology Results (last 7 days)     Procedure Component Value Units Date/Time    Blood culture [[id number]] Collected:  09/14/19 0617    Order Status:  Completed Specimen:  Blood Updated:  09/17/19 1012     Blood Culture, Routine No Growth to date      No Growth to date      No Growth to date      No Growth to date    Blood culture [[id number]] Collected:  09/13/19 1339    Order Status:  Completed Specimen:  Blood from Peripheral, Antecubital, Left Updated:  09/17/19 0612     Blood Culture, Routine No Growth to date      No Growth to date      No Growth to date      No Growth to date    Blood culture [[id number]] Collected:  09/13/19 1339    Order Status:  Completed Specimen:  Blood from Peripheral, Antecubital, Left Updated:  09/17/19 0612     Blood Culture, Routine No Growth to date      No Growth to date      No Growth to date      No Growth to date    Urine Culture High Risk [[id number]] Collected:  09/16/19 1203     Order Status:  Sent Specimen:  Urine, Clean Catch Updated:  09/16/19 1647    Urine culture [[id number]] Collected:  09/13/19 1649    Order Status:  Completed Specimen:  Urine, Clean Catch Updated:  09/15/19 0318     Urine Culture, Routine No significant growth    Narrative:       Indicated criteria for high risk culture:->Other  Other (specify):->Sepsis        All pertinent labs within the past 24 hours have been reviewed.    Significant Imaging: I have reviewed all pertinent imaging results/findings within the past 24 hours.

## 2019-09-17 NOTE — HPI
Ms. Dozier is a 23 y.o. lady with past medical history of PCOS on Metformin, Anemia, Hypertension, ?Diabetes, and Anxiety who presented on 9/13 with suspected pyelonephritis and sepsis. She was feeling well until 9/12 when she started having chills, shakes, body aches and dizziness. She did not take her temperature at home but was very cold at work and at home. She also noticed increased urinary urgency and frequency, stating she would suddenly feel the urge to urinate, but could not make it to the bathroom in time. On 9/13 she started having right flank pain, so she presented to [facility], who then transferred her to [facility] for further work-up and management. 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 She also reports she has not been taking her metformin. She has not taken any antibiotics prior to coming to hospital either.  ID consulted 9/16 for stil having spikes of fevers on 1 dose vanco and on Pip-tazo from 9/13-9/15. Meropenem started 9/15. Ct Abdomen showed right pyelo no hydro or stone but prox right ureteritis  9/16 ID consulted pt on 9/16 added Dapto and recommended Urology consult  9/17 feels better, still has CVA tenderness, urology recommended renal US that was normal, Tmax 100

## 2019-09-17 NOTE — ASSESSMENT & PLAN NOTE
Urinary tract infection  Leukocytosis  Hyponatremia    WBC (22.47), Lactic acid (2.1), Temp 99.0F-103.3F  Continuous cardiac monitoring  Zosyn 4.5 gms IV every 8 hours  Blood and urine cx NGTD  Continue IVF   Add vanc ID- d/c due to allergy  Repeat CT abdomen reported worsening right-sided pyelonephritis with possible ureteritis.     Consult ID- recommend adding daptomycin in the setting of  Vanc allergy and will need urology consult for ureteritis and Urology consult     Right flamk pain

## 2019-09-17 NOTE — MEDICAL/APP STUDENT
Progress Note  Infectious Disease    Admit Date: 9/13/2019   LOS: 3 days     SUBJECTIVE:     Follow-up for: Pyelonephritis     Ms. Dozier reports she is doing alright. Reports continued urinary frequency and right flank pain, but denies dysuria or hematuria. Also reports continued soft stool, but denies watery consistency or blood in stool. Also denies chest pain, shortness of breath, and cough. She reports on this admission she received Vancomycin, which caused her lips to swell.    OBJECTIVE:     Vital Signs (Most Recent)  Temp: 100 °F (37.8 °C) (09/17/19 0821)  Pulse: 108 (09/17/19 0821)  Resp: 18 (09/17/19 0821)  BP: 137/78 (09/17/19 0821)  SpO2: 99 % (09/17/19 0832)    Temperature Range Min/Max (Last 24H):  Temp:  [96.2 °F (35.7 °C)-102.3 °F (39.1 °C)]     I & O (Last 24H):    Intake/Output Summary (Last 24 hours) at 9/17/2019 0921  Last data filed at 9/17/2019 0245  Gross per 24 hour   Intake 250 ml   Output 500 ml   Net -250 ml       Physical Exam:  General: Awake, alert, lying in bed, in no apparent distress  Head: Normocephalic, Atraumatic  Eyes: EOMI, Sclera anicteric   CV: Tachycardic, regular rhythm, no murmurs, rubs, or gallops   Resp: CTAB, no wheezes, rhonchi, or crackles   Abdomen: Soft, mild tenderness to right mid abdomen, non-distended. Normoactive bowel sounds  Back: Significant right CVA tenderness. No left CVA tenderness  Extremities: No edema, cyanosis, or clubbing noted.   Neuro: Oriented to person, place, and time. Moves all extremities freely.       Lines/Drains:       Peripheral IV - Single Lumen 09/16/19 2200 20 G Left;Posterior Hand (Active)   Site Assessment Clean;Dry;Intact;No redness;No swelling 9/16/2019 11:25 PM   Line Status Infusing 9/16/2019 11:25 PM   Dressing Status Clean;Dry;Intact 9/16/2019 11:25 PM   Dressing Change Due 09/20/19 9/16/2019 11:25 PM   Site Change Due 09/20/19 9/16/2019 11:25 PM       Laboratory:  Recent Labs   Lab 09/13/19  1338  09/15/19  0437 09/16/19  0628  19  0443 19  0444   WBC 22.47*   < > 13.53* 9.23 6.74  --    HGB 10.2*   < > 8.9* 8.3* 8.4*  --    HCT 33.1*   < > 29.9* 27.4* 28.2*  --    *   < > 275 334 345  --    *   < > 135* 137  --  138   K 3.4*   < > 3.8 3.4*  --  3.1*   CL 98   < > 102 104  --  104   CO2 26   < > 25 25  --  26   BUN 13   < > 9 7  --  7   CREATININE 1.25   < > 0.9 0.8  --  0.8   AST 21  --   --   --   --   --    ALT 16  --   --   --   --   --    ALKPHOS 100  --   --   --   --   --    BILITOT 1.1*  --   --   --   --   --     < > = values in this interval not displayed.       Microbiology:   Blood cultures (): No growth to date  Blood cultures (): No growth to date  Urine culture (): No growth to date  Urine culture (): No growth to date      Radiology:  CT Abdomen With Contrast   (19):   1. There is a moderate amount of right perinephric stranding. There is no hydronephrosis or nephrolithiasis. There is a moderate amount of haziness adjacent to the proximal portion of the right ureter. There is no hydroureter or urolithiasis.  A urinary tract infection cannot be excluded.  2. The appendix is normal in appearance.  3. There is a large umbilical hernia. The width of the mouth of this hernia measures 32 mm. There are loops of small bowel that extend into this hernia. There is no evidence of obstruction associated with this hernia.  All CT scans at this facility use dose modulation, iterative reconstruction, and/or weight base dosing when appropriate to reduce radiation dose when appropriate to reduce radiation dose to as low as reasonably achievable.     (9/15/19):   1. Findings concerning for interval worsening right-sided pyelonephritis and potential proximal ureteritis.  No hydronephrosis.  2. Grossly stable few additional findings as above.      Antibiotics:  - Zosyn 4.5g q8h: -9/15   - Meropenem 1g q6h, now q8h: 9/15-present   - Daptomycin 6mg/k/16-present  - Single dose of Vancomycin,  stopped for lip swelling       ASSESSMENT/PLAN:     Active Hospital Problems    Diagnosis  POA    *Sepsis [A41.9]  Yes    Pyelonephritis [N12]  Unknown    Acute cystitis [N30.00]  Yes    Leukocytosis [D72.829]  Yes    Hyponatremia [E87.1]  Yes    Hypokalemia [E87.6]  Yes    Anemia [D64.9]  Yes    Essential hypertension [I10]  Yes    PCOS (polycystic ovarian syndrome) [E28.2]  Yes      Resolved Hospital Problems   No resolved problems to display.       - Continued significant right CVA tenderness on exam with CT concerning for worsening right-sided pyelonephritis with possible ureteritis.  - Continues to spike a fever, but trending down. Continue to follow fever curve.   - Blood cultures and urine cultures continue to be negative.   - Currently on broad-spectrum coverage with Meropenem and Daptomycin, due to Vancomycin allergy, for infection of unknown pathogen. Recommend continuing at this time.   - Recommend Urology consult      Thank you for the consult. Will continue to follow at this time.    Katherin Limon, MS3  Infectious Disease

## 2019-09-17 NOTE — NURSING
Patient complaining of burning to IV site. Upon assessment, unable to flush/pull back blood with noticeable swelling above site. IV removed. RNs x3 attempts with no success.

## 2019-09-17 NOTE — PROGRESS NOTES
[facility] Hospital Medicine  Progress Note    Patient Name: Meghna Dozier  MRN: [id number]  Patient Class: IP- Inpatient   Admission Date: 9/13/2019  Length of Stay: 3 days  Attending Physician: Gopal Abrams DO  Primary Care Provider: Hubert Still MD        Subjective:     Principal Problem:Sepsis        HPI:  Meghna Dozier is a 23 year old female with a past medical history of Anemia, Anxiety and Hypertension. She lives in [place], [place]. Her PCP is Dr. Hubert Still.    She presented to [facility] 9/13/2019 with a chief complaint of urinary frequency that began one day prior. Associated symptoms include fever and generalized malaise.  She denies any burning with urination but just increased frequency of urination.  She reports mild lower abdominal pain and low back pain. She denies any vaginal bleeding, discharge or groin rash. She denies any nausea, vomiting, diarrhea or constipation.  She reports that she has a history of diabetes secondary to her PCOS.  She reports that she has not been taking her metformin for several months.  She states that she has not taken her blood glucose levels recently.     ED workup revealed Point of care glucose 92. UPT negative.  Urinalysis reveals trace blood and no evidence of UTI.  Beta hydroxybutyrate 0.2.  CBC reveals WBC 22.47, anemia with hemoglobin 10.2 and hematocrit 33.1.  CMP reveals sodium 134, potassium 3.4, total bilirubin 1.1.  Lactic acid 2.1.  Lipase 74.  CT of the abdomen/pelvis revealed moderate amount of right perinephric stranding.  No hydronephrosis or nephrolithiasis noted.  Moderate amount of haziness adjacent to the proximal portion of the right ureter.  There is no hydroureter or urolithiasis. The appendix appears normal.  Large umbilical hernia.  No evidence of obstruction associated with the hernia. Transferred to [facility] for higher level of care and further evaluation and treatment of sepsi and  pyelonephritis.     Overview/Hospital Course:  Blood and urine cx -NGTD. Continue with Zosyh  9/15 patient with fever and chills. Vancomycin added on 9/15, wbc improving. Repeat CT concerning for interval worsening right-sided pyelonephritis and potential proximal ureteritis.  No hydronephrosis. Switch Zosyn to Meropenem. ID recommend adding daptomycin in the setting of  Vanc allergy and will need urology consult for ureteritis  9/17 pt seen, have flank pain in the right side will check us to r/o hydronephrosis    Interval History: awake and alert. Persistent fever,     Wbc improved, urine and blood culture- NGTD  Patient started with Zosyn, with persistent fever, Add Vanc but had allergic reaction. Now on Meropenem.  ID recommend adding daptomycin in the setting of  Vanc allergy and will need urology consult for ureteritis    Review of Systems   Constitutional: Negative for fever.   HENT: Negative for congestion and nosebleeds.    Respiratory: Negative for cough and shortness of breath.    Cardiovascular: Negative for chest pain and palpitations.   Gastrointestinal: Negative for abdominal distention, abdominal pain, diarrhea, nausea and vomiting.   Genitourinary: Positive for flank pain (positive right sided flank pain on plapation). Negative for dysuria, frequency and urgency.   Musculoskeletal: Negative for joint swelling.   Skin: Negative for color change and rash.   Neurological: Negative for weakness.   Psychiatric/Behavioral: Negative for agitation.     Objective:     Vital Signs (Most Recent):  Temp: 99.5 °F (37.5 °C) (09/17/19 1156)  Pulse: 95 (09/17/19 1542)  Resp: 18 (09/17/19 1156)  BP: 139/80 (09/17/19 1156)  SpO2: 99 % (09/17/19 0832) Vital Signs (24h Range):  Temp:  [96.2 °F (35.7 °C)-102.3 °F (39.1 °C)] 99.5 °F (37.5 °C)  Pulse:  [] 95  Resp:  [16-18] 18  SpO2:  [99 %-100 %] 99 %  BP: (128-139)/(77-83) 139/80     Weight: 76.1 kg (167 lb 12.3 oz)  Body mass index is 29.72 kg/m².    Intake/Output  Summary (Last 24 hours) at 9/17/2019 1620  Last data filed at 9/17/2019 1300  Gross per 24 hour   Intake 670 ml   Output 500 ml   Net 170 ml      Physical Exam   Constitutional: She is oriented to person, place, and time. No distress.   HENT:   Head: Normocephalic and atraumatic.   Eyes: Pupils are equal, round, and reactive to light.   Neck: Normal range of motion. Neck supple. No JVD present.   Cardiovascular: Regular rhythm.   Tachycardia   Pulmonary/Chest: Effort normal and breath sounds normal. No respiratory distress.   Abdominal: Soft. Bowel sounds are normal. She exhibits no distension. There is no tenderness. There is CVA tenderness.   R flank tenderness   Musculoskeletal: Normal range of motion.   Neurological: She is alert and oriented to person, place, and time.   Skin: Skin is warm and dry. Capillary refill takes less than 2 seconds. She is not diaphoretic.   Psychiatric: She has a normal mood and affect. Her behavior is normal. Judgment and thought content normal.   Nursing note and vitals reviewed.      Significant Labs:   Recent Labs   Lab 09/15/19  0437 09/16/19  0628 09/17/19  0443   WBC 13.53* 9.23 6.74   HGB 8.9* 8.3* 8.4*   HCT 29.9* 27.4* 28.2*    334 345     Recent Labs   Lab 09/15/19  0437 09/16/19  0628 09/17/19  0444   * 137 138   K 3.8 3.4* 3.1*    104 104   CO2 25 25 26   BUN 9 7 7   CREATININE 0.9 0.8 0.8   GLU 83 108 98   CALCIUM 9.2 9.1 9.2     Recent Labs   Lab 09/13/19  1338   ALKPHOS 100   ALT 16   AST 21   ALBUMIN 4.3   PROT 8.7*   BILITOT 1.1*      No results for input(s): CPK, CPKMB, MB, TROPONINI in the last 72 hours.  Recent Labs   Lab 09/16/19  0526 09/16/19  1115 09/16/19  1636 09/16/19  2105 09/17/19  0501 09/17/19  1103   POCTGLUCOSE 145* 84 110 100 89 76     Microbiology Results (last 7 days)     Procedure Component Value Units Date/Time    Blood culture [[id number]] Collected:  09/14/19 0617    Order Status:  Completed Specimen:  Blood Updated:  09/17/19  1012     Blood Culture, Routine No Growth to date      No Growth to date      No Growth to date      No Growth to date    Blood culture [[id number]] Collected:  09/13/19 1339    Order Status:  Completed Specimen:  Blood from Peripheral, Antecubital, Left Updated:  09/17/19 0612     Blood Culture, Routine No Growth to date      No Growth to date      No Growth to date      No Growth to date    Blood culture [[id number]] Collected:  09/13/19 1339    Order Status:  Completed Specimen:  Blood from Peripheral, Antecubital, Left Updated:  09/17/19 0612     Blood Culture, Routine No Growth to date      No Growth to date      No Growth to date      No Growth to date    Urine Culture High Risk [[id number]] Collected:  09/16/19 1203    Order Status:  Sent Specimen:  Urine, Clean Catch Updated:  09/16/19 1647    Urine culture [[id number]] Collected:  09/13/19 1649    Order Status:  Completed Specimen:  Urine, Clean Catch Updated:  09/15/19 0318     Urine Culture, Routine No significant growth    Narrative:       Indicated criteria for high risk culture:->Other  Other (specify):->Sepsis            No results found for: HGBA1C  Scheduled Meds:   amLODIPine  10 mg Oral Daily    DAPTOmycin (CUBICIN)  IV  6 mg/kg Intravenous Q24H    enoxparin  40 mg Subcutaneous Daily    meropenem (MERREM) IVPB  1 g Intravenous Q8H     Continuous Infusions:   sodium chloride 0.9% 100 mL/hr at 09/15/19 0824     As Needed: acetaminophen, Dextrose 10% Bolus, Dextrose 10% Bolus, glucagon (human recombinant), glucose, glucose, HYDROcodone-acetaminophen, ibuprofen, ondansetron, potassium chloride 10%, sodium chloride 0.9%      Significant Imaging: I have reviewed all pertinent imaging results/findings within the past 24 hours.      Assessment/Plan:      * Sepsis  Urinary tract infection  Leukocytosis  Hyponatremia    WBC (22.47), Lactic acid (2.1), Temp 99.0F-103.3F  Continuous cardiac monitoring  Zosyn 4.5 gms IV every 8 hours  Blood and urine cx  NGTD  Continue IVF   Add vanc ID- d/c due to allergy  Repeat CT abdomen reported worsening right-sided pyelonephritis with possible ureteritis.     Consult ID- recommend adding daptomycin in the setting of  Vanc allergy and will need urology consult for ureteritis and Urology consult     Right flamk pain            Anemia  Iron deficiency anemia due to chronic blood loss    H&H stable  Monitor CBC      Hypokalemia  K 3.4, replace orally  Monitor BMP      PCOS (polycystic ovarian syndrome)  Follows Dr. Gilma Mejia, non compliant with Metformin.      Essential hypertension  -158  Continue Amlodipine 10 mg po daily       VTE Risk Mitigation (From admission, onward)        Ordered     enoxaparin injection 40 mg  Daily      09/13/19 2300     IP VTE HIGH RISK PATIENT  Once      09/13/19 2300                Gopal Abrams DO  Department of Hospital Medicine   [facility]

## 2019-09-17 NOTE — PLAN OF CARE
VN rounds: VN cued into pt's room with pt's permission. Pt resting in bed. VN instructed pt to call for assistance. Pt aware and agreeable. NAD noted. Allowed time for questions. Will cont to be available and intervene as needed.        09/17/19 1116   Type of Frequent Check   Type Patient Rounds;Other (see comments)  (VN rounds)   Safety/Activity   Patient Rounds visualized patient;call light in patient/parent reach   Safety Promotion/Fall Prevention assistive device/personal item within reach;instructed to call staff for mobility   Positioning   Body Position positioned/repositioned independently   Pain/Comfort/Sleep   Preferred Pain Scale number (Numeric Rating Pain Scale)   Pain Body Location back   Pain Rating (0-10): Rest 4   Sleep/Rest/Relaxation awake   Assessments (Pre/Post)   Level of Consciousness (AVPU) alert

## 2019-09-18 ENCOUNTER — DOCUMENTATION ONLY (OUTPATIENT)
Dept: UROLOGY | Facility: HOSPITAL | Age: 24
End: 2019-09-18

## 2019-09-18 VITALS
RESPIRATION RATE: 19 BRPM | HEART RATE: 92 BPM | DIASTOLIC BLOOD PRESSURE: 96 MMHG | WEIGHT: 167.75 LBS | HEIGHT: 63 IN | SYSTOLIC BLOOD PRESSURE: 141 MMHG | OXYGEN SATURATION: 100 % | TEMPERATURE: 98 F | BODY MASS INDEX: 29.72 KG/M2

## 2019-09-18 LAB
ANION GAP SERPL CALC-SCNC: 7 MMOL/L (ref 8–16)
ANISOCYTOSIS BLD QL SMEAR: SLIGHT
BASOPHILS # BLD AUTO: 0.04 K/UL (ref 0–0.2)
BASOPHILS NFR BLD: 0.7 % (ref 0–1.9)
BUN SERPL-MCNC: 8 MG/DL (ref 6–20)
CALCIUM SERPL-MCNC: 9.5 MG/DL (ref 8.7–10.5)
CHLORIDE SERPL-SCNC: 105 MMOL/L (ref 95–110)
CO2 SERPL-SCNC: 27 MMOL/L (ref 23–29)
CREAT SERPL-MCNC: 0.8 MG/DL (ref 0.5–1.4)
DIFFERENTIAL METHOD: ABNORMAL
EOSINOPHIL # BLD AUTO: 0.2 K/UL (ref 0–0.5)
EOSINOPHIL NFR BLD: 3.5 % (ref 0–8)
ERYTHROCYTE [DISTWIDTH] IN BLOOD BY AUTOMATED COUNT: 19.5 % (ref 11.5–14.5)
EST. GFR  (AFRICAN AMERICAN): >60 ML/MIN/1.73 M^2
EST. GFR  (NON AFRICAN AMERICAN): >60 ML/MIN/1.73 M^2
GLUCOSE SERPL-MCNC: 104 MG/DL (ref 70–110)
HCT VFR BLD AUTO: 27.7 % (ref 37–48.5)
HGB BLD-MCNC: 8.2 G/DL (ref 12–16)
HYPOCHROMIA BLD QL SMEAR: ABNORMAL
LYMPHOCYTES # BLD AUTO: 2 K/UL (ref 1–4.8)
LYMPHOCYTES NFR BLD: 35.5 % (ref 18–48)
MCH RBC QN AUTO: 19.7 PG (ref 27–31)
MCHC RBC AUTO-ENTMCNC: 29.6 G/DL (ref 32–36)
MCV RBC AUTO: 66 FL (ref 82–98)
MONOCYTES # BLD AUTO: 1 K/UL (ref 0.3–1)
MONOCYTES NFR BLD: 17.2 % (ref 4–15)
NEUTROPHILS # BLD AUTO: 2.5 K/UL (ref 1.8–7.7)
NEUTROPHILS NFR BLD: 43.1 % (ref 38–73)
PLATELET # BLD AUTO: 382 K/UL (ref 150–350)
PLATELET BLD QL SMEAR: ABNORMAL
PMV BLD AUTO: 9.9 FL (ref 9.2–12.9)
POCT GLUCOSE: 88 MG/DL (ref 70–110)
POCT GLUCOSE: 93 MG/DL (ref 70–110)
POIKILOCYTOSIS BLD QL SMEAR: SLIGHT
POLYCHROMASIA BLD QL SMEAR: ABNORMAL
POTASSIUM SERPL-SCNC: 3.8 MMOL/L (ref 3.5–5.1)
RBC # BLD AUTO: 4.17 M/UL (ref 4–5.4)
SODIUM SERPL-SCNC: 139 MMOL/L (ref 136–145)
WBC # BLD AUTO: 5.74 K/UL (ref 3.9–12.7)

## 2019-09-18 PROCEDURE — 25000003 PHARM REV CODE 250: Performed by: NURSE PRACTITIONER

## 2019-09-18 PROCEDURE — 63600175 PHARM REV CODE 636 W HCPCS: Performed by: FAMILY MEDICINE

## 2019-09-18 PROCEDURE — 80048 BASIC METABOLIC PNL TOTAL CA: CPT

## 2019-09-18 PROCEDURE — 36415 COLL VENOUS BLD VENIPUNCTURE: CPT

## 2019-09-18 RX ORDER — AMOXICILLIN AND CLAVULANATE POTASSIUM 875; 125 MG/1; MG/1
1 TABLET, FILM COATED ORAL EVERY 12 HOURS
Qty: 10 TABLET | Refills: 0 | Status: SHIPPED | OUTPATIENT
Start: 2019-09-18 | End: 2019-09-24 | Stop reason: SDUPTHER

## 2019-09-18 RX ADMIN — HYDROCODONE BITARTRATE AND ACETAMINOPHEN 1 TABLET: 5; 325 TABLET ORAL at 03:09

## 2019-09-18 RX ADMIN — Medication 1 G: at 03:09

## 2019-09-18 RX ADMIN — AMLODIPINE BESYLATE 10 MG: 5 TABLET ORAL at 09:09

## 2019-09-18 NOTE — PLAN OF CARE
VN reviewed discharge instructions with pt.  AVS printed and handed to pt by bedside nurse.  Reviewed follow-up appointments, medications, diet, and importance of medication compliance.  Reviewed home care instructions, treatment plan, self-management, and when to seek medical attention.  Allowed time for questions.  All questions answered.  Patient verbalized complete understanding of discharge instructions and voices no concerns.     Discharge instructions complete.  Bedside delivery done.  Transport/wheelchair requested.  Bedside nurse notified.

## 2019-09-18 NOTE — NURSING
In bed awake has no c/o pain/discomfort no distress noted call light in reach refused bed alarm to be set will continue to montior.

## 2019-09-18 NOTE — PROGRESS NOTES
Meghna Dozier is a 23 y.o. female admitted with sepsis and imaging findings on CT scan suggesting pyelonephritis. Cultures pending. On broad spectrum antibiotic coverage.  CT scan- right perinephric stranding, no hydronephrosis, no stone, no abcess or mass.  Sonogram- normal study  Creatinine 0.8  I have reviewed imaging studies.  Recommendation: Continue current antibiotic coverage as recommended by ID. Await culture and sensitivity results and adjust accordingly. As long as she continues to improve would not recommend and urologic intervention at this time.

## 2019-09-18 NOTE — PLAN OF CARE
Problem: Adult Inpatient Plan of Care  Goal: Plan of Care Review  Outcome: Ongoing (interventions implemented as appropriate)  Pt in bed watching tv at this time. Pt AA0x4 today. Continues to call staff for assistance. Pt continues on IV ABT with 0 ase noted. Took and tolerated all meds today. Pt denies pain when asked. NRS on tele with 0 alarms. Side rails up x2., and call light within reach.

## 2019-09-18 NOTE — NURSING
Sitting up in bed now rates pain 3/10 mak has no additional  complaints no distress noted call light in reach will continue to monitor refused bed alarm to be set male visitor at bedside.

## 2019-09-18 NOTE — DISCHARGE SUMMARY
[facility] Hospital Medicine  Discharge Summary      Patient Name: Meghna Dozier  MRN: [id number]  Admission Date: 9/13/2019  Hospital Length of Stay: 4 days  Discharge Date and Time: No discharge date for patient encounter.  Attending Physician: Gopal Abrams DO   Discharging Provider: Gopal Abrams DO  Primary Care Provider: Hubert Still MD      HPI:   Meghna Dozier is a 23 year old female with a past medical history of Anemia, Anxiety and Hypertension. She lives in [place], [place]. Her PCP is Dr. Hubert Still.    She presented to [facility] 9/13/2019 with a chief complaint of urinary frequency that began one day prior. Associated symptoms include fever and generalized malaise.  She denies any burning with urination but just increased frequency of urination.  She reports mild lower abdominal pain and low back pain. She denies any vaginal bleeding, discharge or groin rash. She denies any nausea, vomiting, diarrhea or constipation.  She reports that she has a history of diabetes secondary to her PCOS.  She reports that she has not been taking her metformin for several months.  She states that she has not taken her blood glucose levels recently.     ED workup revealed Point of care glucose 92. UPT negative.  Urinalysis reveals trace blood and no evidence of UTI.  Beta hydroxybutyrate 0.2.  CBC reveals WBC 22.47, anemia with hemoglobin 10.2 and hematocrit 33.1.  CMP reveals sodium 134, potassium 3.4, total bilirubin 1.1.  Lactic acid 2.1.  Lipase 74.  CT of the abdomen/pelvis revealed moderate amount of right perinephric stranding.  No hydronephrosis or nephrolithiasis noted.  Moderate amount of haziness adjacent to the proximal portion of the right ureter.  There is no hydroureter or urolithiasis. The appendix appears normal.  Large umbilical hernia.  No evidence of obstruction associated with the hernia. Transferred to [facility] for higher level of care and  further evaluation and treatment of sepsi and pyelonephritis.     * No surgery found *      Hospital Course:   Blood and urine cx -NGTD. Continue with Zosyh  9/15 patient with fever and chills. Vancomycin added on 9/15, wbc improving. Repeat CT concerning for interval worsening right-sided pyelonephritis and potential proximal ureteritis.  No hydronephrosis. Switch Zosyn to Meropenem. ID recommend adding daptomycin in the setting of  Vanc allergy and will need urology consult for ureteritis  9/17 pt seen, have flank pain in the right side will check us to r/o hydronephrosis  9/18 wbc trending down nicely to normal range, it is down to 5 from as high as 20K. The US is unremarkable, pt has no fever. Can be discharged home safely with f/u with PCP in 3 days, on augmentin x 5 days.  Microbiology Results (last 7 days)     Procedure Component Value Units Date/Time    Blood culture [[id number]] Collected:  09/13/19 1339    Order Status:  Completed Specimen:  Blood from Peripheral, Antecubital, Left Updated:  09/18/19 0612     Blood Culture, Routine No Growth to date      No Growth to date      No Growth to date      No Growth to date      No Growth to date    Blood culture [[id number]] Collected:  09/13/19 1339    Order Status:  Completed Specimen:  Blood from Peripheral, Antecubital, Left Updated:  09/18/19 0612     Blood Culture, Routine No Growth to date      No Growth to date      No Growth to date      No Growth to date      No Growth to date    Urine Culture High Risk [[id number]] Collected:  09/16/19 1203    Order Status:  Completed Specimen:  Urine, Clean Catch Updated:  09/17/19 2111     Urine Culture, Routine No significant growth    Narrative:       Indicated criteria for high risk culture:->Other  Other (specify):->peristent fever    Blood culture [[id number]] Collected:  09/14/19 0617    Order Status:  Completed Specimen:  Blood Updated:  09/17/19 1012     Blood Culture, Routine No Growth to date      No Growth to  date      No Growth to date      No Growth to date    Urine culture [[id number]] Collected:  09/13/19 1649    Order Status:  Completed Specimen:  Urine, Clean Catch Updated:  09/15/19 0318     Urine Culture, Routine No significant growth    Narrative:       Indicated criteria for high risk culture:->Other  Other (specify):->Sepsis           Consults:   Consults (From admission, onward)        Status Ordering Provider     Inpatient consult to Infectious Diseases  Once     Provider:  Sydney Moore MD    Completed [facility] DECKER     Inpatient consult to Urology  Once     Provider:  (Not yet assigned)    Acknowledged [facility]-PAR[facility] DECKER          No new Assessment & Plan notes have been filed under this hospital service since the last note was generated.  Service: Hospital Medicine    Final Active Diagnoses:    Diagnosis Date Noted POA    PRINCIPAL PROBLEM:  Sepsis [A41.9] 09/13/2019 Yes    Pyelonephritis [N12]  Unknown    Acute cystitis [N30.00] 09/15/2019 Yes    Leukocytosis [D72.829] 09/14/2019 Yes    Hyponatremia [E87.1] 09/14/2019 Yes    Hypokalemia [E87.6] 09/14/2019 Yes    Anemia [D64.9] 09/14/2019 Yes    Essential hypertension [I10] 04/11/2018 Yes    PCOS (polycystic ovarian syndrome) [E28.2] 04/11/2018 Yes      Problems Resolved During this Admission:       Discharged Condition: stable    Disposition: Home or Self Care    Follow Up:  Follow-up Information     Hubert Still MD In 3 days.    Specialty:  Family Medicine  Contact information:  [address] [id number] [phone]                 Patient Instructions:      Diet Adult Regular     Notify your health care provider if you experience any of the following:  temperature >100.4     Notify your health care provider if you experience any of the following:  persistent nausea and vomiting or diarrhea     Notify your health care provider if you experience any of the following:  severe uncontrolled pain     Notify  your health care provider if you experience any of the following:  difficulty breathing or increased cough     Notify your health care provider if you experience any of the following:  worsening rash     Activity as tolerated       Significant Diagnostic Studies: Labs:   BMP:   Recent Labs   Lab 09/17/19  0444 09/18/19 0457   GLU 98 104    139   K 3.1* 3.8    105   CO2 26 27   BUN 7 8   CREATININE 0.8 0.8   CALCIUM 9.2 9.5   , CMP   Recent Labs   Lab 09/17/19 0444 09/18/19  0457    139   K 3.1* 3.8    105   CO2 26 27   GLU 98 104   BUN 7 8   CREATININE 0.8 0.8   CALCIUM 9.2 9.5   ANIONGAP 8 7*   ESTGFRAFRICA >60 >60   EGFRNONAA >60 >60    and CBC   Recent Labs   Lab 09/17/19 0443 09/18/19 0457   WBC 6.74 5.74   HGB 8.4* 8.2*   HCT 28.2* 27.7*    382*     Microbiology:   Blood Culture   Lab Results   Component Value Date    LABBLOO No Growth to date 09/14/2019    LABBLOO No Growth to date 09/14/2019    LABBLOO No Growth to date 09/14/2019    LABBLOO No Growth to date 09/14/2019    and Urine Culture    Lab Results   Component Value Date    LABURIN No significant growth 09/16/2019       Pending Diagnostic Studies:     None         Medications:  Reconciled Home Medications:      Medication List      START taking these medications    amoxicillin-clavulanate 875-125mg 875-125 mg per tablet  Commonly known as:  AUGMENTIN  Take 1 tablet by mouth every 12 (twelve) hours. for 5 days        CONTINUE taking these medications    amLODIPine 10 MG tablet  Commonly known as:  NORVASC  Take 10 mg by mouth once daily.     ferrous sulfate 325 mg (65 mg iron) Tab tablet  Commonly known as:  FEOSOL  Take 1 tablet (325 mg total) by mouth once daily.     medroxyPROGESTERone 10 MG tablet  Commonly known as:  PROVERA  Take 1 tablet (10 mg total) by mouth once daily. Take 1 pill daily for 10 days out the month for 10 days     moxifloxacin 0.5 % ophthalmic solution  Commonly known as:  VIGAMOX  Place 1 drop  into the left eye 3 (three) times daily.            Indwelling Lines/Drains at time of discharge:   Lines/Drains/Airways          None          Time spent on the discharge of patient: 37 minutes  Patient was seen and examined on the date of discharge and determined to be suitable for discharge.         Gopal Abrams DO  Department of Hospital Medicine  [facility]

## 2019-09-18 NOTE — PLAN OF CARE
Problem: Fall Injury Risk  Goal: Absence of Fall and Fall-Related Injury  Outcome: Ongoing (interventions implemented as appropriate)  Refused bed alarm to be set instructed pt to call when assist verbalized understanding.

## 2019-09-18 NOTE — NURSING
Sitting up in bed has ha 5/10 medicated with prn norco as ordered doc well. No distress noted call light in reach refused bed alarm to be set will continue to monitor male visitor at bedside.

## 2019-09-18 NOTE — NURSING
Bedside report received from offgoing nurse received care of pt in bed aaox4 has no c/o pain/discomfort has on eye glasses. Clear breath sounds upon auscultation no respiratory distress noted pulses palpable active bowel sounds x4 quads. Telemetry sr 94. No distress noted call light in reach refused bed alarm to be set will continue to monitor. Mother at bedside

## 2019-09-18 NOTE — PLAN OF CARE
Follow-up With  Details  Why  Contact Info   Hubert Still MD  On 9/24/2019  12:55pm  [address] [facility] [address]  [phone]     This  put name on white board and explained blue discharge folder to patient. Discharge planning brochure and/or business card given to patient.  Patient verbalized understanding.       09/18/19 1044   Final Note   Assessment Type Final Discharge Note   Anticipated Discharge Disposition Home   Hospital Follow Up  Appt(s) scheduled? Yes   Discharge plans and expectations educations in teach back method with documentation complete? Yes   Right Care Referral Info   Post Acute Recommendation No Care     Dian Solo, RN, CCM, CMSRN  RN Transition Navigator  [phone]

## 2019-09-18 NOTE — NURSING
Upon making end of shift rounds. Noted that pt left hand peripheral iv cath was out of vein with cath intact. Pressure drsg applied pt doc well. Will report to oncomimg nurse to followup and assume care pt now needs new iv access site.no distress noted from pt call light in reach refused bed alarm to be set.

## 2019-09-19 LAB
BACTERIA BLD CULT: NORMAL

## 2020-05-11 ENCOUNTER — PATIENT MESSAGE (OUTPATIENT)
Dept: OBSTETRICS AND GYNECOLOGY | Facility: CLINIC | Age: 25
End: 2020-05-11

## 2020-05-20 ENCOUNTER — LAB VISIT (OUTPATIENT)
Dept: LAB | Facility: HOSPITAL | Age: 25
End: 2020-05-20
Attending: OBSTETRICS & GYNECOLOGY
Payer: MEDICAID

## 2020-05-20 ENCOUNTER — OFFICE VISIT (OUTPATIENT)
Dept: OBSTETRICS AND GYNECOLOGY | Facility: CLINIC | Age: 25
End: 2020-05-20
Payer: MEDICAID

## 2020-05-20 VITALS
HEIGHT: 63 IN | BODY MASS INDEX: 31.62 KG/M2 | DIASTOLIC BLOOD PRESSURE: 92 MMHG | WEIGHT: 178.44 LBS | SYSTOLIC BLOOD PRESSURE: 152 MMHG

## 2020-05-20 DIAGNOSIS — Z31.69 ENCOUNTER FOR INFERTILITY COUNSELING: ICD-10-CM

## 2020-05-20 DIAGNOSIS — E28.2 PCOS (POLYCYSTIC OVARIAN SYNDROME): ICD-10-CM

## 2020-05-20 DIAGNOSIS — N89.8 VAGINAL DISCHARGE: ICD-10-CM

## 2020-05-20 DIAGNOSIS — E28.2 PCOS (POLYCYSTIC OVARIAN SYNDROME): Primary | ICD-10-CM

## 2020-05-20 LAB
DHEA-S SERPL-MCNC: 118.8 UG/DL (ref 95.8–511.7)
FSH SERPL-ACNC: 6.6 MIU/ML
PROLACTIN SERPL IA-MCNC: 9.2 NG/ML (ref 5.2–26.5)

## 2020-05-20 PROCEDURE — 87481 CANDIDA DNA AMP PROBE: CPT | Mod: 59

## 2020-05-20 PROCEDURE — 84402 ASSAY OF FREE TESTOSTERONE: CPT

## 2020-05-20 PROCEDURE — 83498 ASY HYDROXYPROGESTERONE 17-D: CPT

## 2020-05-20 PROCEDURE — 87801 DETECT AGNT MULT DNA AMPLI: CPT

## 2020-05-20 PROCEDURE — 99999 PR PBB SHADOW E&M-EST. PATIENT-LVL III: ICD-10-PCS | Mod: PBBFAC,,, | Performed by: OBSTETRICS & GYNECOLOGY

## 2020-05-20 PROCEDURE — 84146 ASSAY OF PROLACTIN: CPT

## 2020-05-20 PROCEDURE — 83036 HEMOGLOBIN GLYCOSYLATED A1C: CPT

## 2020-05-20 PROCEDURE — 83001 ASSAY OF GONADOTROPIN (FSH): CPT

## 2020-05-20 PROCEDURE — 99214 PR OFFICE/OUTPT VISIT, EST, LEVL IV, 30-39 MIN: ICD-10-PCS | Mod: S$PBB,,, | Performed by: OBSTETRICS & GYNECOLOGY

## 2020-05-20 PROCEDURE — 99999 PR PBB SHADOW E&M-EST. PATIENT-LVL III: CPT | Mod: PBBFAC,,, | Performed by: OBSTETRICS & GYNECOLOGY

## 2020-05-20 PROCEDURE — 99213 OFFICE O/P EST LOW 20 MIN: CPT | Mod: PBBFAC | Performed by: OBSTETRICS & GYNECOLOGY

## 2020-05-20 PROCEDURE — 99214 OFFICE O/P EST MOD 30 MIN: CPT | Mod: S$PBB,,, | Performed by: OBSTETRICS & GYNECOLOGY

## 2020-05-20 PROCEDURE — 82627 DEHYDROEPIANDROSTERONE: CPT

## 2020-05-20 RX ORDER — MEDROXYPROGESTERONE ACETATE 10 MG/1
10 TABLET ORAL DAILY
Qty: 10 TABLET | Refills: 2 | Status: SHIPPED | OUTPATIENT
Start: 2020-05-20 | End: 2020-12-04 | Stop reason: SDUPTHER

## 2020-05-20 NOTE — PATIENT INSTRUCTIONS
Understanding Fertility Problems: The Womans Evaluation    To help your doctor look for the cause of fertility issues, you will have an evaluation. It will include a medical history, physical exam, and some basic tests. If needed, your doctor may also suggest procedures. These allow him or her to look at your reproductive organs.  Medical history  Your doctor will ask about your health and lifestyle. Youll also be asked about factors that can affect your ability to get pregnant such as how often you have sex, your menstrual cycle and any medicines or herbs you take. Be sure to mention any prior pregnancies or surgeries. You should also mention if youve had any pelvic infections or sexually transmitted infections.  Physical exam  A physical exam helps your doctor learn about your health. It includes a pelvic exam to check for swelling, infection, or other problems. Your hormone function is also checked. To do this, your doctor looks at your breast development, body fat, and hair distribution.  Basic tests  Your evaluation will likely include some basic tests. These include blood tests. In some cases, it also includes tests that check the health of your cervix.  · Blood tests can be used to check the following factors:  ¨ Hormone levels (FSH, LH, AMH, estrogen, and progesterone, prolactin)  ¨ Blood sugar and insulin levels  ¨ Tests for current or prior pelvic infection  ¨ Thyroid function  · Cervical cultures are samples taken from the cervix using a sterile swab. The sample is then sent to a lab and checked for signs of infections that can affect fertility.  Imaging tests  Your doctor may recommend that you have imaging tests. These show the reproductive organs in more detail. These tests may be done in the doctors office or in a hospital or surgery center. In most cases, they cause little or no discomfort.  · HSG (hysterosalpingogram). This is an X-ray test. It is used to view the shape of the uterus and make  sure the fallopian tubes are open. During the test, contrast fluid is placed in the uterus and fallopian tubes. The contrast makes it easier to see problems on the X-rays.  · Ultrasound. This uses painless sound waves to make images of internal organs. This can help show problems with the ovaries or uterine lining.  · Sonohysterogram. This is an ultrasound done with sterile saltwater (saline) solution placed in the uterus. The saline makes it easier to view the inside of the uterus.  Other tests  If needed, your doctor may suggest other, less common tests. Some can be done in your doctors office. Others are done in a hospital or surgery center. For certain procedures, youll be given anesthesia to prevent discomfort.  · Hysteroscopy. This uses a small, lighted tube called a hysteroscope to view the inside of the cervix and uterus. It is usually done just after your period.  · Laparoscopy. This is a type of surgery that can help reveal problems on the surface of the reproductive organs. A thin, lighted tube device called a laparoscope is inserted into the body. Your doctor then views the reproductive organs through the scope.  Date Last Reviewed: 5/21/2015  © 8599-6070 [employer]. [address] [place] [address], [place] [address]. All rights reserved. This information is not intended as a substitute for professional medical care. Always follow your healthcare professional's instructions.

## 2020-05-20 NOTE — PROGRESS NOTES
History & Physical  Gynecology      SUBJECTIVE:     Chief Complaint: Menstrual Problem       History of Present Illness:  Ms. Dozier is a 23 y/o female who presents to clinic to discuss irregular periods and infertility. She reports that her periods have been abnormal for years. She was last seen by an OBGYN in 2018 at which time she was diagnosed with PCOS and started on Metformin. She reports that she was prescribed provera once but does not believe that she took it correctly. She reports that she does not know when to expect ovulation now does she know her cycle days. She reports that her  Patient's last menstrual period was 05/06/2020. Prior to this month, her last period was in February. She reports that she also stopped Metformin as it has been causing her GI issues.       Indication  ========  PCOS.    Assessment  ==========  LMP on 5/24/2018. Day of cycle: 86.    Method  ======  Transvaginal ultrasound examination.    Uterus  ======  Uterus: Normal  Uterus position: anteverted  Endometrium: clearly visualized, thickened  Uterus long 7.4 cm  Uterus ap 3.1 cm  Uterus tr 3.9 cm  Uterus vol 47.2 cmÂ³  Endometrial thickness, total 9.7 mm    Right Ovary  =========  Rt ovary: Visualized  Rt ovary morphology: polycystic  Rt ovary D1 3.2 cm  Rt ovary D2 2.9 cm  Rt ovary D3 2.2 cm  Rt ovary mean 2.8 cm  Rt ovary vol 10.7 cmÂ³    Left Ovary  ========  Lt ovary: Visualized  Lt ovary morphology: polycystic  Lt ovary D1 2.4 cm  Lt ovary D2 2.4 cm  Lt ovary D3 2.1 cm  Lt ovary mean 2.3 cm  Lt ovary vol 6.6 cmÂ³    Impression  =========  anteverted normal sized uterus with right ovary >10 cm3 but left ovary normal sized, both have multiple small follicles and are consistent with polycystic ovaries.                                                        Review of patient's allergies indicates:   Allergen Reactions    Vancomycin analogues Itching     Swollen lips        Past Medical History:   Diagnosis Date    Anemia      Anxiety     Hypertension      History reviewed. No pertinent surgical history.  OB History        0    Para   0    Term   0       0    AB   0    Living   0       SAB   0    TAB   0    Ectopic   0    Multiple   0    Live Births   0               Family History   Problem Relation Age of Onset    Hypertension Father     Diabetes Father     Hypertension Mother      Social History     Tobacco Use    Smoking status: Never Smoker    Smokeless tobacco: Never Used   Substance Use Topics    Alcohol use: No    Drug use: No       Current Outpatient Medications   Medication Sig    amLODIPine (NORVASC) 10 MG tablet Take 10 mg by mouth once daily.    ferrous sulfate (FEOSOL) 325 mg (65 mg iron) Tab tablet Take 1 tablet (325 mg total) by mouth once daily.    medroxyPROGESTERone (PROVERA) 10 MG tablet Take 1 tablet (10 mg total) by mouth once daily. for 10 days    moxifloxacin (VIGAMOX) 0.5 % ophthalmic solution Place 1 drop into the left eye 3 (three) times daily.     No current facility-administered medications for this visit.          Review of Systems:  Review of Systems   Constitutional: Negative for chills and fever.   Eyes: Negative for visual disturbance.   Respiratory: Negative for cough and wheezing.    Cardiovascular: Negative for chest pain and palpitations.   Gastrointestinal: Negative for abdominal pain, nausea and vomiting.   Genitourinary: Positive for menstrual problem. Negative for dysuria, frequency, hematuria, pelvic pain, vaginal bleeding, vaginal discharge and vaginal pain.   Neurological: Negative for headaches.   Psychiatric/Behavioral: Negative for depression.        OBJECTIVE:     Physical Exam:  Physical Exam   Constitutional: She is oriented to person, place, and time. She appears well-developed and well-nourished.   Cardiovascular: Normal rate.   Pulmonary/Chest: Effort normal. No respiratory distress.   Abdominal: Soft. She exhibits no distension. There is no tenderness.    Genitourinary: Pelvic exam was performed with patient supine.   Neurological: She is oriented to person, place, and time.   Skin: Skin is warm and dry.   Psychiatric: She has a normal mood and affect.   Nursing note and vitals reviewed.    ASSESSMENT:       ICD-10-CM ICD-9-CM    1. PCOS (polycystic ovarian syndrome) E28.2 256.4 Prolactin      Testosterone, free      Hemoglobin A1C      17-Hydroxyprogesterone      DHEA-sulfate      Follicle stimulating hormone      medroxyPROGESTERone (PROVERA) 10 MG tablet   2. Encounter for infertility counseling Z31.69 V26.49        Plan:      Meghna was seen today for menstrual problem.    Diagnoses and all orders for this visit:    PCOS (polycystic ovarian syndrome)  -     Prolactin; Future  -     Testosterone, free; Future  -     Hemoglobin A1C; Future  -     17-Hydroxyprogesterone; Future  -     DHEA-sulfate; Future  -     Follicle stimulating hormone; Future  -     medroxyPROGESTERone (PROVERA) 10 MG tablet; Take 1 tablet (10 mg total) by mouth once daily. for 10 days  - Discussed PCOS with patient using [place] Criteria. Patient with facial hair growth and oligomenorrhea. Discussed lab tests ordered and TVUS reviewed to further explore the diagnosis.  - Will start cyclic provera to regulate periods  - Discussed insulin resistance and cardiovascular disease that has been linked to PCOS. Discussed the most important step to take at this time is lifestyle modifications with exercise and diet modification.     Encounter for infertility counseling  - Extensive conversation about infertility. Discussed cycles with patient.  - Patient with history of PCOS and abnormal periods. Has never counted her cycles days or knew when to expect ovulation.  - Discussed ovulatory, tubal and male factor infertility  - Discussed ovulation with patient and when to expect ovulation. Advised patient to start the use of OPKs to denote id she ovulating.  - Discussed timed intercourse with  patient  - Low risk factor for tubal infertility. Likely ovulatory factor.          Orders Placed This Encounter   Procedures    Prolactin    Testosterone, free    Hemoglobin A1C    17-Hydroxyprogesterone    DHEA-sulfate    Follicle stimulating hormone       Follow up in about 3 months (around 8/20/2020) for routine OB.    Counseling time: 30 minutes    Alexis Rahman

## 2020-05-21 LAB
ESTIMATED AVG GLUCOSE: 111 MG/DL (ref 68–131)
HBA1C MFR BLD HPLC: 5.5 % (ref 4–5.6)

## 2020-05-22 LAB
BACTERIAL VAGINOSIS DNA: NEGATIVE
CANDIDA GLABRATA DNA: NEGATIVE
CANDIDA KRUSEI DNA: NEGATIVE
CANDIDA RRNA VAG QL PROBE: POSITIVE
T VAGINALIS RRNA GENITAL QL PROBE: NEGATIVE
TESTOST FREE SERPL-MCNC: 0.8 PG/ML

## 2020-05-23 DIAGNOSIS — B37.9 YEAST INFECTION: Primary | ICD-10-CM

## 2020-05-23 RX ORDER — FLUCONAZOLE 150 MG/1
150 TABLET ORAL DAILY
Qty: 1 TABLET | Refills: 0 | Status: SHIPPED | OUTPATIENT
Start: 2020-05-23 | End: 2020-05-24

## 2020-05-26 LAB — 17OHP SERPL-MCNC: 72 NG/DL (ref 35–413)

## 2020-06-19 ENCOUNTER — OCCUPATIONAL HEALTH (OUTPATIENT)
Dept: URGENT CARE | Facility: CLINIC | Age: 25
End: 2020-06-19

## 2020-06-19 DIAGNOSIS — Z02.1 PRE-EMPLOYMENT EXAMINATION: Primary | ICD-10-CM

## 2020-06-19 LAB
CTP QC/QA: YES
POC 10 PANEL DRUG SCREEN: NEGATIVE

## 2020-06-19 PROCEDURE — 80305 DRUG TEST PRSMV DIR OPT OBS: CPT | Mod: QW,S$GLB,, | Performed by: PHYSICIAN ASSISTANT

## 2020-06-19 PROCEDURE — 86580 TB INTRADERMAL TEST: CPT | Mod: S$GLB,,, | Performed by: PHYSICIAN ASSISTANT

## 2020-06-19 PROCEDURE — 99499 UNLISTED E&M SERVICE: CPT | Mod: S$GLB,,, | Performed by: PHYSICIAN ASSISTANT

## 2020-06-19 PROCEDURE — 99499 PHYSICAL, BASIC COMPLEXITY: ICD-10-PCS | Mod: S$GLB,,, | Performed by: PHYSICIAN ASSISTANT

## 2020-06-19 PROCEDURE — 86580 POCT TB SKIN TEST: ICD-10-PCS | Mod: S$GLB,,, | Performed by: PHYSICIAN ASSISTANT

## 2020-06-19 PROCEDURE — 80305 POCT RAPID DRUG SCREEN 10 PANEL: ICD-10-PCS | Mod: QW,S$GLB,, | Performed by: PHYSICIAN ASSISTANT

## 2020-07-02 ENCOUNTER — OCCUPATIONAL HEALTH (OUTPATIENT)
Dept: URGENT CARE | Facility: CLINIC | Age: 25
End: 2020-07-02
Payer: OTHER MISCELLANEOUS

## 2020-07-02 DIAGNOSIS — Z02.83 ENCOUNTER FOR DRUG SCREENING: Primary | ICD-10-CM

## 2020-07-02 PROCEDURE — 80305 DRUG TEST PRSMV DIR OPT OBS: CPT | Mod: QW,S$GLB,, | Performed by: NURSE PRACTITIONER

## 2020-07-02 PROCEDURE — 80305 POCT RAPID DRUG SCREEN 10 PANEL: ICD-10-PCS | Mod: QW,S$GLB,, | Performed by: NURSE PRACTITIONER

## 2020-07-02 NOTE — PROGRESS NOTES
Subjective:       Patient ID: Meghna Dozier is a 24 y.o. female.    Vitals:  vitals were not taken for this visit.     Chief Complaint: Drug / Alcohol Assessment    Drug test rapid 10 pre employment    Drug / Alcohol Assessment      ROS    Objective:      Physical Exam      Assessment:       No diagnosis found.    Plan:         There are no diagnoses linked to this encounter.

## 2020-07-09 LAB
CTP QC/QA: YES
POC 10 PANEL DRUG SCREEN: NEGATIVE

## 2020-07-09 NOTE — ADDENDUM NOTE
Addended by: ARDHA DOMINGO on: 7/9/2020 02:13 PM     Modules accepted: Orders    
Addended by: RADHA DOMINGO on: 7/9/2020 02:16 PM     Modules accepted: Orders    
negative...

## 2020-10-23 ENCOUNTER — HOSPITAL ENCOUNTER (EMERGENCY)
Facility: HOSPITAL | Age: 25
Discharge: HOME | End: 2020-10-23
Attending: EMERGENCY MEDICINE
Payer: MEDICAID

## 2020-10-23 VITALS
HEART RATE: 89 BPM | TEMPERATURE: 98 F | DIASTOLIC BLOOD PRESSURE: 120 MMHG | BODY MASS INDEX: 30.53 KG/M2 | RESPIRATION RATE: 14 BRPM | HEIGHT: 66 IN | SYSTOLIC BLOOD PRESSURE: 162 MMHG | WEIGHT: 190 LBS | OXYGEN SATURATION: 98 %

## 2020-10-23 DIAGNOSIS — R07.89 ANTERIOR CHEST WALL PAIN: ICD-10-CM

## 2020-10-23 DIAGNOSIS — R51.9 SINUS HEADACHE: Primary | ICD-10-CM

## 2020-10-23 PROCEDURE — 63600175 PHARM REV CODE 636 W HCPCS: Performed by: EMERGENCY MEDICINE

## 2020-10-23 PROCEDURE — 99284 EMERGENCY DEPT VISIT MOD MDM: CPT | Mod: 25

## 2020-10-23 PROCEDURE — 96372 THER/PROPH/DIAG INJ SC/IM: CPT

## 2020-10-23 RX ORDER — LORATADINE 10 MG/1
10 TABLET ORAL DAILY
Qty: 20 TABLET | Refills: 0 | Status: SHIPPED | OUTPATIENT
Start: 2020-10-23 | End: 2021-06-29

## 2020-10-23 RX ORDER — DEXAMETHASONE SODIUM PHOSPHATE 4 MG/ML
4 INJECTION, SOLUTION INTRA-ARTICULAR; INTRALESIONAL; INTRAMUSCULAR; INTRAVENOUS; SOFT TISSUE
Status: COMPLETED | OUTPATIENT
Start: 2020-10-23 | End: 2020-10-23

## 2020-10-23 RX ADMIN — DEXAMETHASONE SODIUM PHOSPHATE 4 MG: 4 INJECTION, SOLUTION INTRA-ARTICULAR; INTRALESIONAL; INTRAMUSCULAR; INTRAVENOUS; SOFT TISSUE at 10:10

## 2020-10-24 NOTE — ED PROVIDER NOTES
"Encounter Date: 10/23/2020       History     Chief Complaint   Patient presents with    Headache     " headache, chest pain when coughing, and pain to left arm x 2 weeks"      25-year-old female presents with congestion headache.  Patient states that she has chronic congestion, she takes no medications.  She states that she has been having headaches for the past 2 weeks along with coughing.  She states she also has left upper chest pain that occurs when she coughs.  She denies any fever, she denies any shortness of breath, she denies any vomiting.        Review of patient's allergies indicates:   Allergen Reactions    Vancomycin analogues Itching     Swollen lips      Past Medical History:   Diagnosis Date    Anemia     Anxiety     Hypertension      History reviewed. No pertinent surgical history.  Family History   Problem Relation Age of Onset    Hypertension Father     Diabetes Father     Hypertension Mother      Social History     Tobacco Use    Smoking status: Never Smoker    Smokeless tobacco: Never Used   Substance Use Topics    Alcohol use: No    Drug use: No     Review of Systems   HENT: Positive for congestion.    Respiratory: Positive for cough.    Cardiovascular: Positive for chest pain.   Neurological: Positive for headaches.   All other systems reviewed and are negative.      Physical Exam     Initial Vitals [10/23/20 2124]   BP Pulse Resp Temp SpO2   (!) 162/120 104 19 98.4 °F (36.9 °C) 99 %      MAP       --         Physical Exam    Nursing note and vitals reviewed.  Constitutional: She appears well-developed and well-nourished.   HENT:   Mouth/Throat: Oropharynx is clear and moist.   Cardiovascular: Normal rate and regular rhythm.   Pulmonary/Chest: Breath sounds normal. No respiratory distress.   Or left upper chest wall tenderness to palpation, there is no crepitus.  Pain is also elicited with movement of left arm.   Neurological: She is alert and oriented to person, place, and time. "   Skin: Skin is warm and dry.         ED Course   Procedures  Labs Reviewed - No data to display       Imaging Results    None                                      Clinical Impression:     ICD-10-CM ICD-9-CM   1. Sinus headache  R51.9 784.0   2. Anterior chest wall pain  R07.89 786.52                          ED Disposition Condition    Discharge Stable        ED Prescriptions     Medication Sig Dispense Start Date End Date Auth. Provider    loratadine (CLARITIN) 10 mg tablet Take 1 tablet (10 mg total) by mouth once daily. 20 tablet 10/23/2020  Dimitris Hendrix MD        Follow-up Information     Follow up With Specialties Details Why Contact Info    Hubert Still MD Family Medicine In 3 days  [address] [facility] [address] [phone]                                         Dimitris Hendrix MD  10/23/20 7030

## 2020-12-04 ENCOUNTER — PATIENT MESSAGE (OUTPATIENT)
Dept: OBSTETRICS AND GYNECOLOGY | Facility: CLINIC | Age: 25
End: 2020-12-04

## 2020-12-07 DIAGNOSIS — B37.9 YEAST INFECTION: Primary | ICD-10-CM

## 2020-12-07 RX ORDER — FLUCONAZOLE 150 MG/1
150 TABLET ORAL DAILY
Qty: 1 TABLET | Refills: 0 | Status: SHIPPED | OUTPATIENT
Start: 2020-12-07 | End: 2020-12-08

## 2020-12-23 ENCOUNTER — OFFICE VISIT (OUTPATIENT)
Dept: OBSTETRICS AND GYNECOLOGY | Facility: CLINIC | Age: 25
End: 2020-12-23
Payer: MEDICAID

## 2020-12-23 VITALS
DIASTOLIC BLOOD PRESSURE: 87 MMHG | BODY MASS INDEX: 29.73 KG/M2 | HEIGHT: 66 IN | WEIGHT: 185 LBS | SYSTOLIC BLOOD PRESSURE: 179 MMHG

## 2020-12-23 DIAGNOSIS — Z31.69 ENCOUNTER FOR INFERTILITY COUNSELING: Primary | ICD-10-CM

## 2020-12-23 PROCEDURE — 99214 PR OFFICE/OUTPT VISIT, EST, LEVL IV, 30-39 MIN: ICD-10-PCS | Mod: S$PBB,,, | Performed by: OBSTETRICS & GYNECOLOGY

## 2020-12-23 PROCEDURE — 99999 PR PBB SHADOW E&M-EST. PATIENT-LVL III: CPT | Mod: PBBFAC,,, | Performed by: OBSTETRICS & GYNECOLOGY

## 2020-12-23 PROCEDURE — 99214 OFFICE O/P EST MOD 30 MIN: CPT | Mod: S$PBB,,, | Performed by: OBSTETRICS & GYNECOLOGY

## 2020-12-23 PROCEDURE — 99999 PR PBB SHADOW E&M-EST. PATIENT-LVL III: ICD-10-PCS | Mod: PBBFAC,,, | Performed by: OBSTETRICS & GYNECOLOGY

## 2020-12-23 PROCEDURE — 99213 OFFICE O/P EST LOW 20 MIN: CPT | Mod: PBBFAC | Performed by: OBSTETRICS & GYNECOLOGY

## 2020-12-23 RX ORDER — MEDROXYPROGESTERONE ACETATE 10 MG/1
10 TABLET ORAL
COMMUNITY
End: 2020-12-23 | Stop reason: SDUPTHER

## 2020-12-23 RX ORDER — MEDROXYPROGESTERONE ACETATE 10 MG/1
10 TABLET ORAL DAILY
Qty: 10 TABLET | Refills: 2 | Status: SHIPPED | OUTPATIENT
Start: 2020-12-23 | End: 2021-01-02

## 2020-12-23 RX ORDER — CLOMIPHENE CITRATE 50 MG/1
50 TABLET ORAL DAILY
Qty: 5 TABLET | Refills: 2 | Status: SHIPPED | OUTPATIENT
Start: 2020-12-23 | End: 2020-12-28

## 2020-12-29 NOTE — PATIENT INSTRUCTIONS
Clomiphene tablets  What is this medicine?  CLOMIPHENE (KLOE mi feen) is a fertility drug that increases the chance of pregnancy. It helps women ovulate (produce a mature egg) during their cycle.  How should I use this medicine?  Take this medicine by mouth with a glass of water. Follow the directions on the prescription label. Take exactly as directed for the exact number of days prescribed. Take your doses at regular intervals. Most women take this medicine for a 5 day period, but the length of treatment may be adjusted. Your doctor will give you a start date for this medication and will give you instructions on proper use. Do not take your medicine more often than directed.  Talk to your pediatrician regarding the use of this medicine in children. Special care may be needed.  What side effects may I notice from receiving this medicine?  Side effects that you should report to your doctor or health care professional as soon as possible:  · allergic reactions like skin rash, itching or hives, swelling of the face, lips, or tongue  · breathing problems  · changes in vision  · fluid retention  · nausea, vomiting  · pelvic pain or bloating  · severe abdominal pain  · sudden weight gain  Side effects that usually do not require medical attention (report to your doctor or health care professional if they continue or are bothersome):  · breast discomfort  · hot flashes  · mild pelvic discomfort  · mild nausea  What may interact with this medicine?  · herbal or dietary supplements, like blue cohosh, black cohosh, chasteberry, or DHEA  · prasterone  What if I miss a dose?  If you miss a dose, take it as soon as you can. If it is almost time for your next dose, take only that dose. Do not take double or extra doses.  Where should I keep my medicine?  Keep out of the reach of children.  Store at room temperature between 15 and 30 degrees C (59 and 86 degrees F). Protect from heat, light, and moisture. Throw away any unused  medicine after the expiration date.  What should I tell my health care provider before I take this medicine?  They need to know if you have any of these conditions:  · adrenal gland disease  · blood vessel disease or blood clots  · cyst on the ovary  · endometriosis  · liver disease  · ovarian cancer  · pituitary gland disease  · vaginal bleeding that has not been evaluated  · an unusual or allergic reaction to clomiphene, other medicines, foods, dyes, or preservatives  · pregnant (should not be used if you are already pregnant)  · breast-feeding  What should I watch for while using this medicine?  Make sure you understand how and when to use this medicine. You need to know when you are ovulating and when to have sexual intercourse. This will increase the chance of a pregnancy.  Visit your doctor or health care professional for regular checks on your progress. You may need tests to check the hormone levels in your blood or you may have to use home-urine tests to check for ovulation. Try to keep any appointments.  Compared to other fertility treatments, this medicine does not greatly increase your chances of having multiple babies. An increased chance of having twins may occur in roughly 5 out of every 100 women who take this medication.  Stop taking this medicine at once and contact your doctor or health care professional if you think you are pregnant.  This medicine is not for long-term use. Most women that benefit from this medicine do so within the first three cycles (months). Your doctor or health care professional will monitor your condition. This medicine is usually used for a total of 6 cycles of treatment.  You may get drowsy or dizzy. Do not drive, use machinery, or do anything that needs mental alertness until you know how this drug affects you. Do not stand or sit up quickly. This reduces the risk of dizzy or fainting spells.  Drinking alcoholic beverages or smoking tobacco may decrease your chance of  becoming pregnant. Limit or stop alcohol and tobacco use during your fertility treatments.  NOTE:This sheet is a summary. It may not cover all possible information. If you have questions about this medicine, talk to your doctor, pharmacist, or health care provider. Copyright© 2017 [employer]

## 2020-12-29 NOTE — PROGRESS NOTES
History & Physical  Gynecology      SUBJECTIVE:     Chief Complaint: Med Change Follow Up       History of Present Illness:  Ms. Dozier is a 26 y/o female who presents to clinic to discuss irregular periods and infertility. She reports that her periods have been abnormal for years. She was last seen by an OBGYN in 2018 at which time she was diagnosed with PCOS and started on Metformin. She reports that she was prescribed provera once but does not believe that she took it correctly. She reports that she does not know when to expect ovulation now does she know her cycle days. She reports that her  Patient's last menstrual period was 2020. Prior to this month, her last period was in February. She reports that she also stopped Metformin as it has been causing her GI issues.     Review of patient's allergies indicates:   Allergen Reactions    Vancomycin analogues Itching     Swollen lips        Past Medical History:   Diagnosis Date    Anemia     Anxiety     Hypertension      History reviewed. No pertinent surgical history.  OB History        0    Para   0    Term   0       0    AB   0    Living   0       SAB   0    TAB   0    Ectopic   0    Multiple   0    Live Births   0               Family History   Problem Relation Age of Onset    Hypertension Father     Diabetes Father     Hypertension Mother      Social History     Tobacco Use    Smoking status: Never Smoker    Smokeless tobacco: Never Used   Substance Use Topics    Alcohol use: No    Drug use: No       Current Outpatient Medications   Medication Sig    ferrous sulfate (FEOSOL) 325 mg (65 mg iron) Tab tablet Take 1 tablet (325 mg total) by mouth once daily.    medroxyPROGESTERone (PROVERA) 10 MG tablet Take 1 tablet (10 mg total) by mouth once daily. for 10 days    amLODIPine (NORVASC) 10 MG tablet Take 10 mg by mouth once daily.    loratadine (CLARITIN) 10 mg tablet Take 1 tablet (10 mg total) by mouth once daily. (Patient not  taking: Reported on 12/23/2020)    moxifloxacin (VIGAMOX) 0.5 % ophthalmic solution Place 1 drop into the left eye 3 (three) times daily. (Patient not taking: Reported on 12/23/2020)     No current facility-administered medications for this visit.        Review of Systems:  Review of Systems   Constitutional: Negative for chills and fever.   Eyes: Negative for visual disturbance.   Respiratory: Negative for cough and wheezing.    Cardiovascular: Negative for chest pain and palpitations.   Gastrointestinal: Negative for abdominal pain, nausea and vomiting.   Genitourinary: Negative for dysuria, frequency, hematuria, pelvic pain, vaginal bleeding, vaginal discharge and vaginal pain.        Amenorrhea   Neurological: Negative for headaches.   Psychiatric/Behavioral: Negative for depression.        OBJECTIVE:     Physical Exam:  Physical Exam  Vitals signs and nursing note reviewed.   Constitutional:       Appearance: She is well-developed.   Cardiovascular:      Rate and Rhythm: Normal rate.   Pulmonary:      Effort: Pulmonary effort is normal. No respiratory distress.   Abdominal:      General: There is no distension.      Palpations: Abdomen is soft.      Tenderness: There is no abdominal tenderness.   Genitourinary:     Exam position: Supine.   Skin:     General: Skin is warm and dry.   Neurological:      Mental Status: She is oriented to person, place, and time.       ASSESSMENT:       ICD-10-CM ICD-9-CM    1. Encounter for infertility counseling  Z31.69 V26.49 clomiPHENE (CLOMID) 50 mg tablet      medroxyPROGESTERone (PROVERA) 10 MG tablet      Plan:      Meghna was seen today for med change follow up.    Diagnoses and all orders for this visit:    Encounter for infertility counseling  -     clomiPHENE (CLOMID) 50 mg tablet; Take 1 tablet (50 mg total) by mouth once daily. Start on D#3 of cycle for 5 days  -     medroxyPROGESTERone (PROVERA) 10 MG tablet; Take 1 tablet (10 mg total) by mouth once daily. for 10  "days  - - Take cycle day 3-7. Call our office to coordinate day 21 labs..  When using clomid, you should be aware of the increased risk of multiple pregnancies (most commonly twins) -- it is about 8%.  And the main side effects of clomid are mood swings (64-78%) and hot flashes (10%).  There is no clear increased risk of miscarriages or ovarian cancer with clomid use.     The protocol that we use is as follows:  1)  The first day of your menstrual cycle is considered "Day 1"  2)  You should take the Clomid on days 3-7.  Call my nurse at [phone] so she can coordinate your labs.  3)  Your "fertile time" is between days 10-14.  This is when you can either check your basal body temperatures or use an ovulation predictor kit  4)  I would like to check your progesterone levels on day 21.  5)  If you haven't started your period by day 30, check a pregnancy test and notify our office of the results so we can plan the next phase of care.      No orders of the defined types were placed in this encounter.      Follow up in about 3 months (around 3/23/2021) for Follow up.    Counseling time: 45 minutes    Alexis Rahman    "

## 2021-01-31 ENCOUNTER — PATIENT MESSAGE (OUTPATIENT)
Dept: OBSTETRICS AND GYNECOLOGY | Facility: CLINIC | Age: 26
End: 2021-01-31

## 2021-02-02 ENCOUNTER — LAB VISIT (OUTPATIENT)
Dept: LAB | Facility: HOSPITAL | Age: 26
End: 2021-02-02
Attending: OBSTETRICS & GYNECOLOGY
Payer: MEDICAID

## 2021-02-02 ENCOUNTER — PATIENT MESSAGE (OUTPATIENT)
Dept: OBSTETRICS AND GYNECOLOGY | Facility: CLINIC | Age: 26
End: 2021-02-02

## 2021-02-02 ENCOUNTER — OFFICE VISIT (OUTPATIENT)
Dept: OBSTETRICS AND GYNECOLOGY | Facility: CLINIC | Age: 26
End: 2021-02-02
Payer: MEDICAID

## 2021-02-02 VITALS
DIASTOLIC BLOOD PRESSURE: 84 MMHG | WEIGHT: 191.81 LBS | SYSTOLIC BLOOD PRESSURE: 132 MMHG | BODY MASS INDEX: 30.96 KG/M2

## 2021-02-02 DIAGNOSIS — N91.2 AMENORRHEA: ICD-10-CM

## 2021-02-02 DIAGNOSIS — I10 HYPERTENSION, UNSPECIFIED TYPE: ICD-10-CM

## 2021-02-02 DIAGNOSIS — Z32.01 POSITIVE PREGNANCY TEST: ICD-10-CM

## 2021-02-02 DIAGNOSIS — Z32.01 POSITIVE PREGNANCY TEST: Primary | ICD-10-CM

## 2021-02-02 DIAGNOSIS — E28.2 PCOS (POLYCYSTIC OVARIAN SYNDROME): ICD-10-CM

## 2021-02-02 LAB
ALBUMIN SERPL BCP-MCNC: 3.8 G/DL (ref 3.5–5.2)
ALP SERPL-CCNC: 65 U/L (ref 38–126)
ALT SERPL W/O P-5'-P-CCNC: 15 U/L (ref 10–44)
ANION GAP SERPL CALC-SCNC: 8 MMOL/L (ref 8–16)
AST SERPL-CCNC: 19 U/L (ref 15–46)
BILIRUB SERPL-MCNC: 0.3 MG/DL (ref 0.1–1)
CALCIUM SERPL-MCNC: 9.5 MG/DL (ref 8.7–10.5)
CHLORIDE SERPL-SCNC: 105 MMOL/L (ref 95–110)
CO2 SERPL-SCNC: 27 MMOL/L (ref 23–29)
CREAT SERPL-MCNC: 0.67 MG/DL (ref 0.5–1.4)
EST. GFR  (AFRICAN AMERICAN): >60 ML/MIN/1.73 M^2
EST. GFR  (NON AFRICAN AMERICAN): >60 ML/MIN/1.73 M^2
GLUCOSE SERPL-MCNC: 146 MG/DL (ref 70–110)
HCG INTACT+B SERPL-ACNC: 153 MIU/ML
POTASSIUM SERPL-SCNC: 3.8 MMOL/L (ref 3.5–5.1)
PROT SERPL-MCNC: 7.6 G/DL (ref 6–8.4)
SODIUM SERPL-SCNC: 140 MMOL/L (ref 136–145)
UUN UR-MCNC: 8 MG/DL (ref 7–17)

## 2021-02-02 PROCEDURE — 87591 N.GONORRHOEAE DNA AMP PROB: CPT

## 2021-02-02 PROCEDURE — 36415 COLL VENOUS BLD VENIPUNCTURE: CPT | Mod: PO

## 2021-02-02 PROCEDURE — 84702 CHORIONIC GONADOTROPIN TEST: CPT | Mod: PO

## 2021-02-02 PROCEDURE — 99213 OFFICE O/P EST LOW 20 MIN: CPT | Mod: PBBFAC,TH,PN | Performed by: OBSTETRICS & GYNECOLOGY

## 2021-02-02 PROCEDURE — 87086 URINE CULTURE/COLONY COUNT: CPT

## 2021-02-02 PROCEDURE — 87491 CHLMYD TRACH DNA AMP PROBE: CPT

## 2021-02-02 PROCEDURE — 99999 PR PBB SHADOW E&M-EST. PATIENT-LVL III: CPT | Mod: PBBFAC,,, | Performed by: OBSTETRICS & GYNECOLOGY

## 2021-02-02 PROCEDURE — 83021 HEMOGLOBIN CHROMOTOGRAPHY: CPT | Mod: PO

## 2021-02-02 PROCEDURE — 86592 SYPHILIS TEST NON-TREP QUAL: CPT | Mod: PO

## 2021-02-02 PROCEDURE — 86703 HIV-1/HIV-2 1 RESULT ANTBDY: CPT | Mod: PO

## 2021-02-02 PROCEDURE — 99999 PR PBB SHADOW E&M-EST. PATIENT-LVL III: ICD-10-PCS | Mod: PBBFAC,,, | Performed by: OBSTETRICS & GYNECOLOGY

## 2021-02-02 PROCEDURE — 99203 OFFICE O/P NEW LOW 30 MIN: CPT | Mod: S$PBB,TH,, | Performed by: OBSTETRICS & GYNECOLOGY

## 2021-02-02 PROCEDURE — 80053 COMPREHEN METABOLIC PANEL: CPT | Mod: PO

## 2021-02-02 PROCEDURE — 87340 HEPATITIS B SURFACE AG IA: CPT | Mod: PO

## 2021-02-02 PROCEDURE — 88175 CYTOPATH C/V AUTO FLUID REDO: CPT | Performed by: PATHOLOGY

## 2021-02-02 PROCEDURE — 99203 PR OFFICE/OUTPT VISIT, NEW, LEVL III, 30-44 MIN: ICD-10-PCS | Mod: S$PBB,TH,, | Performed by: OBSTETRICS & GYNECOLOGY

## 2021-02-02 PROCEDURE — 82570 ASSAY OF URINE CREATININE: CPT

## 2021-02-02 RX ORDER — MEDROXYPROGESTERONE ACETATE 10 MG/1
10 TABLET ORAL DAILY
COMMUNITY
End: 2021-06-29

## 2021-02-02 RX ORDER — CLOMIPHENE CITRATE 50 MG/1
TABLET ORAL
COMMUNITY
Start: 2021-01-02 | End: 2021-06-29

## 2021-02-03 LAB
CREAT UR-MCNC: 158 MG/DL (ref 15–325)
HBV SURFACE AG SERPL QL IA: NEGATIVE
HGB A2 MFR BLD HPLC: 2.2 % (ref 2.2–3.2)
HGB FRACT BLD ELPH-IMP: NORMAL
HGB FRACT BLD ELPH-IMP: NORMAL
HIV 1+2 AB+HIV1 P24 AG SERPL QL IA: NEGATIVE
PROT UR-MCNC: <7 MG/DL (ref 0–15)
PROT/CREAT UR: NORMAL MG/G{CREAT} (ref 0–0.2)
RPR SER QL: NORMAL

## 2021-02-04 ENCOUNTER — LAB VISIT (OUTPATIENT)
Dept: LAB | Facility: HOSPITAL | Age: 26
End: 2021-02-04
Attending: OBSTETRICS & GYNECOLOGY
Payer: MEDICAID

## 2021-02-04 ENCOUNTER — PATIENT MESSAGE (OUTPATIENT)
Dept: OBSTETRICS AND GYNECOLOGY | Facility: HOSPITAL | Age: 26
End: 2021-02-04

## 2021-02-04 ENCOUNTER — PATIENT MESSAGE (OUTPATIENT)
Dept: OBSTETRICS AND GYNECOLOGY | Facility: CLINIC | Age: 26
End: 2021-02-04

## 2021-02-04 DIAGNOSIS — Z32.01 POSITIVE PREGNANCY TEST: ICD-10-CM

## 2021-02-04 LAB
C TRACH DNA SPEC QL NAA+PROBE: NOT DETECTED
CANDIDA RRNA VAG QL PROBE: NEGATIVE
G VAGINALIS RRNA GENITAL QL PROBE: NEGATIVE
HCG INTACT+B SERPL-ACNC: 450 MIU/ML
N GONORRHOEA DNA SPEC QL NAA+PROBE: NOT DETECTED
T VAGINALIS RRNA GENITAL QL PROBE: NEGATIVE

## 2021-02-04 PROCEDURE — 36415 COLL VENOUS BLD VENIPUNCTURE: CPT | Mod: PO

## 2021-02-04 PROCEDURE — 84702 CHORIONIC GONADOTROPIN TEST: CPT | Mod: PO

## 2021-02-05 LAB — BACTERIA UR CULT: NORMAL

## 2021-02-17 LAB
FINAL PATHOLOGIC DIAGNOSIS: ABNORMAL
Lab: ABNORMAL

## 2021-02-23 ENCOUNTER — ROUTINE PRENATAL (OUTPATIENT)
Dept: OBSTETRICS AND GYNECOLOGY | Facility: CLINIC | Age: 26
End: 2021-02-23
Payer: MEDICAID

## 2021-02-23 ENCOUNTER — HOSPITAL ENCOUNTER (OUTPATIENT)
Dept: RADIOLOGY | Facility: HOSPITAL | Age: 26
Discharge: HOME | End: 2021-02-23
Attending: OBSTETRICS & GYNECOLOGY
Payer: MEDICAID

## 2021-02-23 ENCOUNTER — PATIENT MESSAGE (OUTPATIENT)
Dept: OBSTETRICS AND GYNECOLOGY | Facility: CLINIC | Age: 26
End: 2021-02-23

## 2021-02-23 VITALS
BODY MASS INDEX: 31.34 KG/M2 | DIASTOLIC BLOOD PRESSURE: 90 MMHG | SYSTOLIC BLOOD PRESSURE: 110 MMHG | WEIGHT: 194.19 LBS

## 2021-02-23 DIAGNOSIS — I10 HYPERTENSION, UNSPECIFIED TYPE: ICD-10-CM

## 2021-02-23 DIAGNOSIS — Z32.01 POSITIVE PREGNANCY TEST: ICD-10-CM

## 2021-02-23 DIAGNOSIS — E28.2 PCOS (POLYCYSTIC OVARIAN SYNDROME): ICD-10-CM

## 2021-02-23 DIAGNOSIS — Z3A.01 6 WEEKS GESTATION OF PREGNANCY: Primary | ICD-10-CM

## 2021-02-23 PROBLEM — E87.1 HYPONATREMIA: Status: RESOLVED | Noted: 2019-09-14 | Resolved: 2021-02-23

## 2021-02-23 PROBLEM — A41.9 SEPSIS: Status: RESOLVED | Noted: 2019-09-13 | Resolved: 2021-02-23

## 2021-02-23 PROBLEM — D72.829 LEUKOCYTOSIS: Status: RESOLVED | Noted: 2019-09-14 | Resolved: 2021-02-23

## 2021-02-23 PROBLEM — N30.00 ACUTE CYSTITIS: Status: RESOLVED | Noted: 2019-09-15 | Resolved: 2021-02-23

## 2021-02-23 PROBLEM — D64.9 ANEMIA: Status: RESOLVED | Noted: 2019-09-14 | Resolved: 2021-02-23

## 2021-02-23 PROBLEM — E87.6 HYPOKALEMIA: Status: RESOLVED | Noted: 2019-09-14 | Resolved: 2021-02-23

## 2021-02-23 PROCEDURE — 99212 OFFICE O/P EST SF 10 MIN: CPT | Mod: TH,S$PBB,, | Performed by: OBSTETRICS & GYNECOLOGY

## 2021-02-23 PROCEDURE — 76801 OB US < 14 WKS SINGLE FETUS: CPT | Mod: TC,PO

## 2021-02-23 PROCEDURE — 99999 PR PBB SHADOW E&M-EST. PATIENT-LVL III: ICD-10-PCS | Mod: PBBFAC,,, | Performed by: OBSTETRICS & GYNECOLOGY

## 2021-02-23 PROCEDURE — 99213 OFFICE O/P EST LOW 20 MIN: CPT | Mod: PBBFAC,25,PN | Performed by: OBSTETRICS & GYNECOLOGY

## 2021-02-23 PROCEDURE — 99212 PR OFFICE/OUTPT VISIT, EST, LEVL II, 10-19 MIN: ICD-10-PCS | Mod: TH,S$PBB,, | Performed by: OBSTETRICS & GYNECOLOGY

## 2021-02-23 PROCEDURE — 99999 PR PBB SHADOW E&M-EST. PATIENT-LVL III: CPT | Mod: PBBFAC,,, | Performed by: OBSTETRICS & GYNECOLOGY

## 2021-03-19 ENCOUNTER — PATIENT MESSAGE (OUTPATIENT)
Dept: OBSTETRICS AND GYNECOLOGY | Facility: CLINIC | Age: 26
End: 2021-03-19

## 2021-03-25 ENCOUNTER — ROUTINE PRENATAL (OUTPATIENT)
Dept: OBSTETRICS AND GYNECOLOGY | Facility: CLINIC | Age: 26
End: 2021-03-25
Payer: MEDICAID

## 2021-03-25 ENCOUNTER — PATIENT MESSAGE (OUTPATIENT)
Dept: ADMINISTRATIVE | Facility: OTHER | Age: 26
End: 2021-03-25

## 2021-03-25 VITALS
BODY MASS INDEX: 31.78 KG/M2 | SYSTOLIC BLOOD PRESSURE: 124 MMHG | WEIGHT: 196.88 LBS | DIASTOLIC BLOOD PRESSURE: 78 MMHG

## 2021-03-25 DIAGNOSIS — B37.2 YEAST DERMATITIS: ICD-10-CM

## 2021-03-25 DIAGNOSIS — Z3A.11 11 WEEKS GESTATION OF PREGNANCY: Primary | ICD-10-CM

## 2021-03-25 DIAGNOSIS — E28.2 PCOS (POLYCYSTIC OVARIAN SYNDROME): ICD-10-CM

## 2021-03-25 DIAGNOSIS — Z36.89 ENCOUNTER FOR FETAL ANATOMIC SURVEY: ICD-10-CM

## 2021-03-25 DIAGNOSIS — I10 HYPERTENSION, UNSPECIFIED TYPE: ICD-10-CM

## 2021-03-25 PROCEDURE — 99213 OFFICE O/P EST LOW 20 MIN: CPT | Mod: TH,S$PBB,, | Performed by: OBSTETRICS & GYNECOLOGY

## 2021-03-25 PROCEDURE — 87661 TRICHOMONAS VAGINALIS AMPLIF: CPT | Mod: 59 | Performed by: OBSTETRICS & GYNECOLOGY

## 2021-03-25 PROCEDURE — 87481 CANDIDA DNA AMP PROBE: CPT | Mod: 59 | Performed by: OBSTETRICS & GYNECOLOGY

## 2021-03-25 PROCEDURE — 99999 PR PBB SHADOW E&M-EST. PATIENT-LVL III: ICD-10-PCS | Mod: PBBFAC,,, | Performed by: OBSTETRICS & GYNECOLOGY

## 2021-03-25 PROCEDURE — 99999 PR PBB SHADOW E&M-EST. PATIENT-LVL III: CPT | Mod: PBBFAC,,, | Performed by: OBSTETRICS & GYNECOLOGY

## 2021-03-25 PROCEDURE — 99213 OFFICE O/P EST LOW 20 MIN: CPT | Mod: PBBFAC,PO | Performed by: OBSTETRICS & GYNECOLOGY

## 2021-03-25 PROCEDURE — 99213 PR OFFICE/OUTPT VISIT, EST, LEVL III, 20-29 MIN: ICD-10-PCS | Mod: TH,S$PBB,, | Performed by: OBSTETRICS & GYNECOLOGY

## 2021-03-25 RX ORDER — TERCONAZOLE 4 MG/G
1 CREAM VAGINAL DAILY
Qty: 1 TUBE | Refills: 1 | Status: SHIPPED | OUTPATIENT
Start: 2021-03-25 | End: 2021-06-29

## 2021-03-26 LAB
BACTERIAL VAGINOSIS DNA: NEGATIVE
CANDIDA GLABRATA DNA: NEGATIVE
CANDIDA KRUSEI DNA: NEGATIVE
CANDIDA RRNA VAG QL PROBE: POSITIVE
T VAGINALIS RRNA GENITAL QL PROBE: NEGATIVE

## 2021-03-29 ENCOUNTER — PATIENT MESSAGE (OUTPATIENT)
Dept: OBSTETRICS AND GYNECOLOGY | Facility: CLINIC | Age: 26
End: 2021-03-29

## 2021-03-29 ENCOUNTER — TELEPHONE (OUTPATIENT)
Dept: OBSTETRICS AND GYNECOLOGY | Facility: CLINIC | Age: 26
End: 2021-03-29

## 2021-03-29 RX ORDER — TERCONAZOLE 4 MG/G
1 CREAM VAGINAL DAILY
Qty: 1 TUBE | Refills: 1 | Status: SHIPPED | OUTPATIENT
Start: 2021-03-29 | End: 2021-06-29

## 2021-03-30 ENCOUNTER — PATIENT MESSAGE (OUTPATIENT)
Dept: OBSTETRICS AND GYNECOLOGY | Facility: CLINIC | Age: 26
End: 2021-03-30

## 2021-03-30 ENCOUNTER — TELEPHONE (OUTPATIENT)
Dept: OBSTETRICS AND GYNECOLOGY | Facility: CLINIC | Age: 26
End: 2021-03-30

## 2021-03-31 ENCOUNTER — PATIENT MESSAGE (OUTPATIENT)
Dept: ADMINISTRATIVE | Facility: OTHER | Age: 26
End: 2021-03-31

## 2021-04-07 ENCOUNTER — PATIENT MESSAGE (OUTPATIENT)
Dept: ADMINISTRATIVE | Facility: OTHER | Age: 26
End: 2021-04-07

## 2021-04-12 ENCOUNTER — PATIENT MESSAGE (OUTPATIENT)
Dept: OBSTETRICS AND GYNECOLOGY | Facility: CLINIC | Age: 26
End: 2021-04-12

## 2021-04-20 ENCOUNTER — PATIENT MESSAGE (OUTPATIENT)
Dept: OBSTETRICS AND GYNECOLOGY | Facility: CLINIC | Age: 26
End: 2021-04-20

## 2021-04-20 ENCOUNTER — ROUTINE PRENATAL (OUTPATIENT)
Dept: OBSTETRICS AND GYNECOLOGY | Facility: CLINIC | Age: 26
End: 2021-04-20
Payer: MEDICAID

## 2021-04-20 VITALS
SYSTOLIC BLOOD PRESSURE: 122 MMHG | DIASTOLIC BLOOD PRESSURE: 76 MMHG | BODY MASS INDEX: 31.86 KG/M2 | WEIGHT: 197.38 LBS

## 2021-04-20 DIAGNOSIS — Z3A.14 14 WEEKS GESTATION OF PREGNANCY: Primary | ICD-10-CM

## 2021-04-20 DIAGNOSIS — I10 HYPERTENSION, UNSPECIFIED TYPE: ICD-10-CM

## 2021-04-20 DIAGNOSIS — E28.2 PCOS (POLYCYSTIC OVARIAN SYNDROME): ICD-10-CM

## 2021-04-20 DIAGNOSIS — N30.00 ACUTE CYSTITIS WITHOUT HEMATURIA: ICD-10-CM

## 2021-04-20 PROCEDURE — 99213 PR OFFICE/OUTPT VISIT, EST, LEVL III, 20-29 MIN: ICD-10-PCS | Mod: TH,S$PBB,, | Performed by: OBSTETRICS & GYNECOLOGY

## 2021-04-20 PROCEDURE — 99999 PR PBB SHADOW E&M-EST. PATIENT-LVL III: CPT | Mod: PBBFAC,,, | Performed by: OBSTETRICS & GYNECOLOGY

## 2021-04-20 PROCEDURE — 99213 OFFICE O/P EST LOW 20 MIN: CPT | Mod: PBBFAC,PN | Performed by: OBSTETRICS & GYNECOLOGY

## 2021-04-20 PROCEDURE — 99213 OFFICE O/P EST LOW 20 MIN: CPT | Mod: TH,S$PBB,, | Performed by: OBSTETRICS & GYNECOLOGY

## 2021-04-20 PROCEDURE — 87086 URINE CULTURE/COLONY COUNT: CPT | Performed by: OBSTETRICS & GYNECOLOGY

## 2021-04-20 PROCEDURE — 99999 PR PBB SHADOW E&M-EST. PATIENT-LVL III: ICD-10-PCS | Mod: PBBFAC,,, | Performed by: OBSTETRICS & GYNECOLOGY

## 2021-04-20 RX ORDER — CEPHALEXIN 500 MG/1
500 CAPSULE ORAL 3 TIMES DAILY
COMMUNITY
Start: 2021-04-17 | End: 2021-06-29

## 2021-04-22 LAB — BACTERIA UR CULT: NO GROWTH

## 2021-04-23 ENCOUNTER — PATIENT MESSAGE (OUTPATIENT)
Dept: OBSTETRICS AND GYNECOLOGY | Facility: HOSPITAL | Age: 26
End: 2021-04-23

## 2021-04-26 ENCOUNTER — PATIENT MESSAGE (OUTPATIENT)
Dept: OBSTETRICS AND GYNECOLOGY | Facility: CLINIC | Age: 26
End: 2021-04-26

## 2021-04-30 ENCOUNTER — PATIENT MESSAGE (OUTPATIENT)
Dept: OBSTETRICS AND GYNECOLOGY | Facility: CLINIC | Age: 26
End: 2021-04-30

## 2021-05-21 ENCOUNTER — PATIENT MESSAGE (OUTPATIENT)
Dept: OBSTETRICS AND GYNECOLOGY | Facility: CLINIC | Age: 26
End: 2021-05-21

## 2021-05-25 ENCOUNTER — PATIENT MESSAGE (OUTPATIENT)
Dept: OBSTETRICS AND GYNECOLOGY | Facility: CLINIC | Age: 26
End: 2021-05-25

## 2021-05-26 ENCOUNTER — PROCEDURE VISIT (OUTPATIENT)
Dept: OBSTETRICS AND GYNECOLOGY | Facility: CLINIC | Age: 26
End: 2021-05-26
Payer: MEDICAID

## 2021-05-26 ENCOUNTER — ROUTINE PRENATAL (OUTPATIENT)
Dept: OBSTETRICS AND GYNECOLOGY | Facility: CLINIC | Age: 26
End: 2021-05-26
Payer: MEDICAID

## 2021-05-26 VITALS
SYSTOLIC BLOOD PRESSURE: 138 MMHG | WEIGHT: 203.94 LBS | BODY MASS INDEX: 32.91 KG/M2 | DIASTOLIC BLOOD PRESSURE: 92 MMHG

## 2021-05-26 DIAGNOSIS — Z3A.20 20 WEEKS GESTATION OF PREGNANCY: ICD-10-CM

## 2021-05-26 DIAGNOSIS — Z3A.20 20 WEEKS GESTATION OF PREGNANCY: Primary | ICD-10-CM

## 2021-05-26 DIAGNOSIS — Z36.89 ENCOUNTER FOR FETAL ANATOMIC SURVEY: ICD-10-CM

## 2021-05-26 DIAGNOSIS — O99.210 OBESITY IN PREGNANCY: ICD-10-CM

## 2021-05-26 DIAGNOSIS — I10 HYPERTENSION, UNSPECIFIED TYPE: ICD-10-CM

## 2021-05-26 DIAGNOSIS — E28.2 PCOS (POLYCYSTIC OVARIAN SYNDROME): ICD-10-CM

## 2021-05-26 PROCEDURE — 99213 PR OFFICE/OUTPT VISIT, EST, LEVL III, 20-29 MIN: ICD-10-PCS | Mod: TH,S$PBB,, | Performed by: OBSTETRICS & GYNECOLOGY

## 2021-05-26 PROCEDURE — 76805 OB US >/= 14 WKS SNGL FETUS: CPT | Mod: PBBFAC,PO | Performed by: OBSTETRICS & GYNECOLOGY

## 2021-05-26 PROCEDURE — 99213 OFFICE O/P EST LOW 20 MIN: CPT | Mod: PBBFAC,PO,25 | Performed by: OBSTETRICS & GYNECOLOGY

## 2021-05-26 PROCEDURE — 99213 OFFICE O/P EST LOW 20 MIN: CPT | Mod: TH,S$PBB,, | Performed by: OBSTETRICS & GYNECOLOGY

## 2021-05-26 PROCEDURE — 76805 PR US, OB 14+WKS, TRANSABD, SINGLE GESTATION: ICD-10-PCS | Mod: 26,S$PBB,, | Performed by: OBSTETRICS & GYNECOLOGY

## 2021-05-26 PROCEDURE — 99999 PR PBB SHADOW E&M-EST. PATIENT-LVL III: CPT | Mod: PBBFAC,,, | Performed by: OBSTETRICS & GYNECOLOGY

## 2021-05-26 PROCEDURE — 76805 OB US >/= 14 WKS SNGL FETUS: CPT | Mod: 26,S$PBB,, | Performed by: OBSTETRICS & GYNECOLOGY

## 2021-05-26 PROCEDURE — 99999 PR PBB SHADOW E&M-EST. PATIENT-LVL III: ICD-10-PCS | Mod: PBBFAC,,, | Performed by: OBSTETRICS & GYNECOLOGY

## 2021-05-31 ENCOUNTER — TELEPHONE (OUTPATIENT)
Dept: OBSTETRICS AND GYNECOLOGY | Facility: HOSPITAL | Age: 26
End: 2021-05-31

## 2021-05-31 DIAGNOSIS — O36.80X0 PREGNANCY OF UNKNOWN ANATOMIC LOCATION: Primary | ICD-10-CM

## 2021-05-31 NOTE — TELEPHONE ENCOUNTER
She was suppose to be set up for a repeat anatomy in 4-6 weeks but I don't see it scheduled. Ok to set it up with visit since she is coming from [place]

## 2021-06-29 ENCOUNTER — PATIENT MESSAGE (OUTPATIENT)
Dept: OBSTETRICS AND GYNECOLOGY | Facility: HOSPITAL | Age: 26
End: 2021-06-29

## 2021-06-29 ENCOUNTER — LAB VISIT (OUTPATIENT)
Dept: LAB | Facility: HOSPITAL | Age: 26
End: 2021-06-29
Attending: OBSTETRICS & GYNECOLOGY
Payer: MEDICAID

## 2021-06-29 ENCOUNTER — PATIENT MESSAGE (OUTPATIENT)
Dept: OBSTETRICS AND GYNECOLOGY | Facility: CLINIC | Age: 26
End: 2021-06-29

## 2021-06-29 ENCOUNTER — ROUTINE PRENATAL (OUTPATIENT)
Dept: OBSTETRICS AND GYNECOLOGY | Facility: CLINIC | Age: 26
End: 2021-06-29
Payer: MEDICAID

## 2021-06-29 VITALS
DIASTOLIC BLOOD PRESSURE: 94 MMHG | WEIGHT: 204.81 LBS | SYSTOLIC BLOOD PRESSURE: 138 MMHG | BODY MASS INDEX: 33.06 KG/M2

## 2021-06-29 DIAGNOSIS — Z3A.24 24 WEEKS GESTATION OF PREGNANCY: Primary | ICD-10-CM

## 2021-06-29 DIAGNOSIS — Z3A.20 20 WEEKS GESTATION OF PREGNANCY: ICD-10-CM

## 2021-06-29 DIAGNOSIS — I10 ESSENTIAL HYPERTENSION: ICD-10-CM

## 2021-06-29 DIAGNOSIS — E28.2 PCOS (POLYCYSTIC OVARIAN SYNDROME): ICD-10-CM

## 2021-06-29 DIAGNOSIS — R73.09 ELEVATED GLUCOSE TOLERANCE TEST: Primary | ICD-10-CM

## 2021-06-29 DIAGNOSIS — J32.9 SINUSITIS, UNSPECIFIED CHRONICITY, UNSPECIFIED LOCATION: ICD-10-CM

## 2021-06-29 LAB — GLUCOSE SERPL-MCNC: 155 MG/DL (ref 70–140)

## 2021-06-29 PROCEDURE — 82950 GLUCOSE TEST: CPT | Mod: PO | Performed by: OBSTETRICS & GYNECOLOGY

## 2021-06-29 PROCEDURE — 99213 PR OFFICE/OUTPT VISIT, EST, LEVL III, 20-29 MIN: ICD-10-PCS | Mod: S$PBB,TH,, | Performed by: OBSTETRICS & GYNECOLOGY

## 2021-06-29 PROCEDURE — 36415 COLL VENOUS BLD VENIPUNCTURE: CPT | Mod: PO | Performed by: OBSTETRICS & GYNECOLOGY

## 2021-06-29 PROCEDURE — 99999 PR PBB SHADOW E&M-EST. PATIENT-LVL III: CPT | Mod: PBBFAC,,, | Performed by: OBSTETRICS & GYNECOLOGY

## 2021-06-29 PROCEDURE — 99213 OFFICE O/P EST LOW 20 MIN: CPT | Mod: PBBFAC,PN | Performed by: OBSTETRICS & GYNECOLOGY

## 2021-06-29 PROCEDURE — 99213 OFFICE O/P EST LOW 20 MIN: CPT | Mod: S$PBB,TH,, | Performed by: OBSTETRICS & GYNECOLOGY

## 2021-06-29 PROCEDURE — 99999 PR PBB SHADOW E&M-EST. PATIENT-LVL III: ICD-10-PCS | Mod: PBBFAC,,, | Performed by: OBSTETRICS & GYNECOLOGY

## 2021-06-29 RX ORDER — AZITHROMYCIN 250 MG/1
TABLET, FILM COATED ORAL
Qty: 6 TABLET | Refills: 0 | Status: SHIPPED | OUTPATIENT
Start: 2021-06-29 | End: 2021-07-20

## 2021-06-30 ENCOUNTER — PATIENT MESSAGE (OUTPATIENT)
Dept: ADMINISTRATIVE | Facility: OTHER | Age: 26
End: 2021-06-30

## 2021-07-07 ENCOUNTER — PROCEDURE VISIT (OUTPATIENT)
Dept: OBSTETRICS AND GYNECOLOGY | Facility: CLINIC | Age: 26
End: 2021-07-07
Payer: MEDICAID

## 2021-07-07 ENCOUNTER — PATIENT MESSAGE (OUTPATIENT)
Dept: OBSTETRICS AND GYNECOLOGY | Facility: HOSPITAL | Age: 26
End: 2021-07-07

## 2021-07-07 PROCEDURE — 76816 OB US FOLLOW-UP PER FETUS: CPT | Mod: 26,S$PBB,, | Performed by: OBSTETRICS & GYNECOLOGY

## 2021-07-07 PROCEDURE — 76816 OB US FOLLOW-UP PER FETUS: CPT | Mod: PBBFAC,PO | Performed by: OBSTETRICS & GYNECOLOGY

## 2021-07-07 PROCEDURE — 76816 PR  US,PREGNANT UTERUS,F/U,TRANSABD APP: ICD-10-PCS | Mod: 26,S$PBB,, | Performed by: OBSTETRICS & GYNECOLOGY

## 2021-07-10 ENCOUNTER — PATIENT MESSAGE (OUTPATIENT)
Dept: OBSTETRICS AND GYNECOLOGY | Facility: CLINIC | Age: 26
End: 2021-07-10

## 2021-07-18 ENCOUNTER — PATIENT MESSAGE (OUTPATIENT)
Dept: OBSTETRICS AND GYNECOLOGY | Facility: CLINIC | Age: 26
End: 2021-07-18

## 2021-07-20 ENCOUNTER — ROUTINE PRENATAL (OUTPATIENT)
Dept: OBSTETRICS AND GYNECOLOGY | Facility: CLINIC | Age: 26
End: 2021-07-20
Payer: MEDICAID

## 2021-07-20 VITALS
SYSTOLIC BLOOD PRESSURE: 144 MMHG | WEIGHT: 201.38 LBS | BODY MASS INDEX: 32.51 KG/M2 | DIASTOLIC BLOOD PRESSURE: 102 MMHG

## 2021-07-20 DIAGNOSIS — R73.09 ELEVATED GLUCOSE TOLERANCE TEST: ICD-10-CM

## 2021-07-20 DIAGNOSIS — Z3A.27 27 WEEKS GESTATION OF PREGNANCY: Primary | ICD-10-CM

## 2021-07-20 DIAGNOSIS — I10 HYPERTENSION, UNSPECIFIED TYPE: ICD-10-CM

## 2021-07-20 DIAGNOSIS — Z36.89 ENCOUNTER FOR ULTRASOUND TO CHECK FETAL GROWTH: ICD-10-CM

## 2021-07-20 LAB
CREAT UR-MCNC: 62 MG/DL (ref 15–325)
PROT UR-MCNC: 14 MG/DL (ref 0–15)
PROT/CREAT UR: 0.23 MG/G{CREAT} (ref 0–0.2)

## 2021-07-20 PROCEDURE — 84156 ASSAY OF PROTEIN URINE: CPT | Performed by: OBSTETRICS & GYNECOLOGY

## 2021-07-20 PROCEDURE — 99213 OFFICE O/P EST LOW 20 MIN: CPT | Mod: TH,S$PBB,, | Performed by: OBSTETRICS & GYNECOLOGY

## 2021-07-20 PROCEDURE — 99213 PR OFFICE/OUTPT VISIT, EST, LEVL III, 20-29 MIN: ICD-10-PCS | Mod: TH,S$PBB,, | Performed by: OBSTETRICS & GYNECOLOGY

## 2021-07-20 PROCEDURE — 99999 PR PBB SHADOW E&M-EST. PATIENT-LVL II: ICD-10-PCS | Mod: PBBFAC,,, | Performed by: OBSTETRICS & GYNECOLOGY

## 2021-07-20 PROCEDURE — 99212 OFFICE O/P EST SF 10 MIN: CPT | Mod: PBBFAC,TH,PN | Performed by: OBSTETRICS & GYNECOLOGY

## 2021-07-20 PROCEDURE — 99999 PR PBB SHADOW E&M-EST. PATIENT-LVL II: CPT | Mod: PBBFAC,,, | Performed by: OBSTETRICS & GYNECOLOGY

## 2021-07-20 RX ORDER — LABETALOL 100 MG/1
100 TABLET, FILM COATED ORAL 2 TIMES DAILY
Qty: 60 TABLET | Refills: 11 | Status: SHIPPED | OUTPATIENT
Start: 2021-07-20 | End: 2022-07-06

## 2021-07-20 RX ORDER — LABETALOL 200 MG/1
100 TABLET, FILM COATED ORAL 2 TIMES DAILY
Qty: 30 TABLET | Refills: 11 | Status: SHIPPED | OUTPATIENT
Start: 2021-07-20 | End: 2021-07-20

## 2021-07-21 ENCOUNTER — PATIENT MESSAGE (OUTPATIENT)
Dept: OBSTETRICS AND GYNECOLOGY | Facility: HOSPITAL | Age: 26
End: 2021-07-21

## 2021-07-21 ENCOUNTER — PATIENT MESSAGE (OUTPATIENT)
Dept: OBSTETRICS AND GYNECOLOGY | Facility: CLINIC | Age: 26
End: 2021-07-21

## 2021-07-21 DIAGNOSIS — I10 HYPERTENSION, UNSPECIFIED TYPE: Primary | ICD-10-CM

## 2021-07-23 ENCOUNTER — LAB VISIT (OUTPATIENT)
Dept: LAB | Facility: HOSPITAL | Age: 26
End: 2021-07-23
Attending: OBSTETRICS & GYNECOLOGY
Payer: MEDICAID

## 2021-07-23 DIAGNOSIS — R73.09 ELEVATED GLUCOSE TOLERANCE TEST: ICD-10-CM

## 2021-07-23 DIAGNOSIS — I10 HYPERTENSION, UNSPECIFIED TYPE: ICD-10-CM

## 2021-07-23 DIAGNOSIS — Z3A.27 27 WEEKS GESTATION OF PREGNANCY: ICD-10-CM

## 2021-07-23 LAB
ALBUMIN SERPL BCP-MCNC: 3.5 G/DL (ref 3.5–5.2)
ALP SERPL-CCNC: 118 U/L (ref 38–126)
ALT SERPL W/O P-5'-P-CCNC: 15 U/L (ref 10–44)
ANION GAP SERPL CALC-SCNC: 6 MMOL/L (ref 8–16)
AST SERPL-CCNC: 22 U/L (ref 15–46)
BASOPHILS # BLD AUTO: 0.05 K/UL (ref 0–0.2)
BASOPHILS NFR BLD: 0.5 % (ref 0–1.9)
BILIRUB SERPL-MCNC: 0.5 MG/DL (ref 0.1–1)
CALCIUM SERPL-MCNC: 9.3 MG/DL (ref 8.7–10.5)
CHLORIDE SERPL-SCNC: 103 MMOL/L (ref 95–110)
CO2 SERPL-SCNC: 25 MMOL/L (ref 23–29)
CREAT SERPL-MCNC: 0.44 MG/DL (ref 0.5–1.4)
DIFFERENTIAL METHOD: ABNORMAL
EOSINOPHIL # BLD AUTO: 0.3 K/UL (ref 0–0.5)
EOSINOPHIL NFR BLD: 2.8 % (ref 0–8)
ERYTHROCYTE [DISTWIDTH] IN BLOOD BY AUTOMATED COUNT: 13.5 % (ref 11.5–14.5)
EST. GFR  (AFRICAN AMERICAN): >60 ML/MIN/1.73 M^2
EST. GFR  (NON AFRICAN AMERICAN): >60 ML/MIN/1.73 M^2
GLUCOSE SERPL-MCNC: 113 MG/DL
GLUCOSE SERPL-MCNC: 134 MG/DL
GLUCOSE SERPL-MCNC: 171 MG/DL
GLUCOSE SERPL-MCNC: 77 MG/DL (ref 70–110)
GLUCOSE SERPL-MCNC: 77 MG/DL (ref 70–110)
HCT VFR BLD AUTO: 37.3 % (ref 37–48.5)
HGB BLD-MCNC: 12.4 G/DL (ref 12–16)
IMM GRANULOCYTES # BLD AUTO: 0.05 K/UL (ref 0–0.04)
IMM GRANULOCYTES NFR BLD AUTO: 0.5 % (ref 0–0.5)
LYMPHOCYTES # BLD AUTO: 1.5 K/UL (ref 1–4.8)
LYMPHOCYTES NFR BLD: 15.2 % (ref 18–48)
MCH RBC QN AUTO: 28.8 PG (ref 27–31)
MCHC RBC AUTO-ENTMCNC: 33.2 G/DL (ref 32–36)
MCV RBC AUTO: 87 FL (ref 82–98)
MONOCYTES # BLD AUTO: 1.1 K/UL (ref 0.3–1)
MONOCYTES NFR BLD: 11.1 % (ref 4–15)
NEUTROPHILS # BLD AUTO: 6.8 K/UL (ref 1.8–7.7)
NEUTROPHILS NFR BLD: 69.9 % (ref 38–73)
NRBC BLD-RTO: 0 /100 WBC
PLATELET # BLD AUTO: 286 K/UL (ref 150–450)
PMV BLD AUTO: 11.3 FL (ref 9.2–12.9)
POTASSIUM SERPL-SCNC: 3.9 MMOL/L (ref 3.5–5.1)
PROT SERPL-MCNC: 7.1 G/DL (ref 6–8.4)
RBC # BLD AUTO: 4.31 M/UL (ref 4–5.4)
SODIUM SERPL-SCNC: 134 MMOL/L (ref 136–145)
UUN UR-MCNC: 3 MG/DL (ref 7–17)
WBC # BLD AUTO: 9.67 K/UL (ref 3.9–12.7)

## 2021-07-23 PROCEDURE — 36415 COLL VENOUS BLD VENIPUNCTURE: CPT | Mod: PO | Performed by: OBSTETRICS & GYNECOLOGY

## 2021-07-23 PROCEDURE — 82952 GTT-ADDED SAMPLES: CPT | Mod: PO | Performed by: OBSTETRICS & GYNECOLOGY

## 2021-07-23 PROCEDURE — 80053 COMPREHEN METABOLIC PANEL: CPT | Mod: PO | Performed by: OBSTETRICS & GYNECOLOGY

## 2021-07-23 PROCEDURE — 86592 SYPHILIS TEST NON-TREP QUAL: CPT | Mod: PO | Performed by: OBSTETRICS & GYNECOLOGY

## 2021-07-23 PROCEDURE — 85025 COMPLETE CBC W/AUTO DIFF WBC: CPT | Mod: PO | Performed by: OBSTETRICS & GYNECOLOGY

## 2021-07-23 PROCEDURE — 87389 HIV-1 AG W/HIV-1&-2 AB AG IA: CPT | Mod: PO | Performed by: OBSTETRICS & GYNECOLOGY

## 2021-07-24 LAB — RPR SER QL: NORMAL

## 2021-07-25 ENCOUNTER — PATIENT MESSAGE (OUTPATIENT)
Dept: OBSTETRICS AND GYNECOLOGY | Facility: CLINIC | Age: 26
End: 2021-07-25

## 2021-07-26 ENCOUNTER — PATIENT MESSAGE (OUTPATIENT)
Dept: OBSTETRICS AND GYNECOLOGY | Facility: HOSPITAL | Age: 26
End: 2021-07-26

## 2021-07-26 ENCOUNTER — PATIENT MESSAGE (OUTPATIENT)
Dept: OBSTETRICS AND GYNECOLOGY | Facility: CLINIC | Age: 26
End: 2021-07-26

## 2021-07-26 LAB — HIV 1+2 AB+HIV1 P24 AG SERPL QL IA: NEGATIVE

## 2021-08-05 ENCOUNTER — PATIENT MESSAGE (OUTPATIENT)
Dept: OBSTETRICS AND GYNECOLOGY | Facility: CLINIC | Age: 26
End: 2021-08-05

## 2021-08-10 ENCOUNTER — PATIENT MESSAGE (OUTPATIENT)
Dept: OBSTETRICS AND GYNECOLOGY | Facility: CLINIC | Age: 26
End: 2021-08-10

## 2021-08-12 ENCOUNTER — PATIENT MESSAGE (OUTPATIENT)
Dept: OBSTETRICS AND GYNECOLOGY | Facility: CLINIC | Age: 26
End: 2021-08-12

## 2021-08-17 ENCOUNTER — ROUTINE PRENATAL (OUTPATIENT)
Dept: OBSTETRICS AND GYNECOLOGY | Facility: CLINIC | Age: 26
End: 2021-08-17
Payer: MEDICAID

## 2021-08-17 ENCOUNTER — TELEPHONE (OUTPATIENT)
Dept: OBSTETRICS AND GYNECOLOGY | Facility: CLINIC | Age: 26
End: 2021-08-17

## 2021-08-17 VITALS
SYSTOLIC BLOOD PRESSURE: 140 MMHG | WEIGHT: 205.63 LBS | BODY MASS INDEX: 33.18 KG/M2 | DIASTOLIC BLOOD PRESSURE: 90 MMHG

## 2021-08-17 DIAGNOSIS — I10 HYPERTENSION, UNSPECIFIED TYPE: ICD-10-CM

## 2021-08-17 DIAGNOSIS — E28.2 PCOS (POLYCYSTIC OVARIAN SYNDROME): ICD-10-CM

## 2021-08-17 DIAGNOSIS — Z3A.31 31 WEEKS GESTATION OF PREGNANCY: Primary | ICD-10-CM

## 2021-08-17 PROCEDURE — 99999 PR PBB SHADOW E&M-EST. PATIENT-LVL II: ICD-10-PCS | Mod: PBBFAC,,, | Performed by: OBSTETRICS & GYNECOLOGY

## 2021-08-17 PROCEDURE — 99212 OFFICE O/P EST SF 10 MIN: CPT | Mod: PBBFAC,PN | Performed by: OBSTETRICS & GYNECOLOGY

## 2021-08-17 PROCEDURE — 99213 PR OFFICE/OUTPT VISIT, EST, LEVL III, 20-29 MIN: ICD-10-PCS | Mod: TH,S$PBB,, | Performed by: OBSTETRICS & GYNECOLOGY

## 2021-08-17 PROCEDURE — 99213 OFFICE O/P EST LOW 20 MIN: CPT | Mod: TH,S$PBB,, | Performed by: OBSTETRICS & GYNECOLOGY

## 2021-08-17 PROCEDURE — 99999 PR PBB SHADOW E&M-EST. PATIENT-LVL II: CPT | Mod: PBBFAC,,, | Performed by: OBSTETRICS & GYNECOLOGY

## 2021-08-17 RX ORDER — CYCLOBENZAPRINE HCL 10 MG
10 TABLET ORAL EVERY 8 HOURS PRN
COMMUNITY
Start: 2021-08-11

## 2021-08-18 ENCOUNTER — PATIENT MESSAGE (OUTPATIENT)
Dept: ADMINISTRATIVE | Facility: OTHER | Age: 26
End: 2021-08-18

## 2021-08-18 ENCOUNTER — PATIENT MESSAGE (OUTPATIENT)
Dept: OBSTETRICS AND GYNECOLOGY | Facility: CLINIC | Age: 26
End: 2021-08-18

## 2021-08-19 ENCOUNTER — PROCEDURE VISIT (OUTPATIENT)
Dept: OBSTETRICS AND GYNECOLOGY | Facility: CLINIC | Age: 26
End: 2021-08-19
Payer: MEDICAID

## 2021-08-19 DIAGNOSIS — Z36.4 ANTENATAL SCREENING FOR FETAL GROWTH RETARDATION USING ULTRASONICS: ICD-10-CM

## 2021-08-19 DIAGNOSIS — O10.913 PRE-EXISTING HYPERTENSION DURING PREGNANCY IN THIRD TRIMESTER, UNSPECIFIED PRE-EXISTING HYPERTENSION TYPE: ICD-10-CM

## 2021-08-19 DIAGNOSIS — Z3A.32 32 WEEKS GESTATION OF PREGNANCY: ICD-10-CM

## 2021-08-19 PROCEDURE — 76816 PR  US,PREGNANT UTERUS,F/U,TRANSABD APP: ICD-10-PCS | Mod: 26,S$PBB,, | Performed by: OBSTETRICS & GYNECOLOGY

## 2021-08-19 PROCEDURE — 76816 OB US FOLLOW-UP PER FETUS: CPT | Mod: 26,S$PBB,, | Performed by: OBSTETRICS & GYNECOLOGY

## 2021-08-19 PROCEDURE — 76816 OB US FOLLOW-UP PER FETUS: CPT | Mod: PBBFAC,PO | Performed by: OBSTETRICS & GYNECOLOGY

## 2021-08-19 PROCEDURE — 76819 PR US, OB, FETAL BIOPHYSICAL, W/O NST: ICD-10-PCS | Mod: 26,S$PBB,, | Performed by: OBSTETRICS & GYNECOLOGY

## 2021-08-19 PROCEDURE — 76819 FETAL BIOPHYS PROFIL W/O NST: CPT | Mod: 26,S$PBB,, | Performed by: OBSTETRICS & GYNECOLOGY

## 2021-08-19 PROCEDURE — 76819 FETAL BIOPHYS PROFIL W/O NST: CPT | Mod: PBBFAC,PO | Performed by: OBSTETRICS & GYNECOLOGY

## 2021-08-24 ENCOUNTER — PATIENT MESSAGE (OUTPATIENT)
Dept: OBSTETRICS AND GYNECOLOGY | Facility: CLINIC | Age: 26
End: 2021-08-24

## 2021-08-25 ENCOUNTER — PATIENT MESSAGE (OUTPATIENT)
Dept: OBSTETRICS AND GYNECOLOGY | Facility: CLINIC | Age: 26
End: 2021-08-25

## 2021-09-02 ENCOUNTER — PATIENT MESSAGE (OUTPATIENT)
Dept: OBSTETRICS AND GYNECOLOGY | Facility: CLINIC | Age: 26
End: 2021-09-02

## 2021-09-02 RX ORDER — LABETALOL 200 MG/1
200 TABLET, FILM COATED ORAL 2 TIMES DAILY
Qty: 60 TABLET | Refills: 11 | Status: SHIPPED | OUTPATIENT
Start: 2021-09-02 | End: 2022-07-06

## 2021-09-06 ENCOUNTER — TELEPHONE (OUTPATIENT)
Dept: OBSTETRICS AND GYNECOLOGY | Facility: CLINIC | Age: 26
End: 2021-09-06

## 2021-09-21 ENCOUNTER — PATIENT MESSAGE (OUTPATIENT)
Dept: OBSTETRICS AND GYNECOLOGY | Facility: CLINIC | Age: 26
End: 2021-09-21

## 2021-09-28 ENCOUNTER — TELEPHONE (OUTPATIENT)
Dept: OBSTETRICS AND GYNECOLOGY | Facility: CLINIC | Age: 26
End: 2021-09-28

## 2021-09-28 ENCOUNTER — TELEPHONE (OUTPATIENT)
Dept: OBSTETRICS AND GYNECOLOGY | Facility: CLINIC | Age: 26
End: 2021-09-28
Payer: MEDICAID

## 2022-07-06 ENCOUNTER — HOSPITAL ENCOUNTER (OUTPATIENT)
Dept: RADIOLOGY | Facility: HOSPITAL | Age: 27
Discharge: HOME | End: 2022-07-06
Payer: MEDICAID

## 2022-07-06 ENCOUNTER — OFFICE VISIT (OUTPATIENT)
Dept: FAMILY MEDICINE | Facility: CLINIC | Age: 27
End: 2022-07-06
Payer: MEDICAID

## 2022-07-06 VITALS
HEART RATE: 71 BPM | TEMPERATURE: 98 F | OXYGEN SATURATION: 98 % | WEIGHT: 168.13 LBS | SYSTOLIC BLOOD PRESSURE: 152 MMHG | DIASTOLIC BLOOD PRESSURE: 84 MMHG | BODY MASS INDEX: 27.13 KG/M2

## 2022-07-06 DIAGNOSIS — Z00.00 ENCOUNTER FOR WELLNESS EXAMINATION: Primary | ICD-10-CM

## 2022-07-06 DIAGNOSIS — I10 ESSENTIAL HYPERTENSION: Chronic | ICD-10-CM

## 2022-07-06 DIAGNOSIS — K42.9 UMBILICAL HERNIA WITHOUT OBSTRUCTION AND WITHOUT GANGRENE: ICD-10-CM

## 2022-07-06 DIAGNOSIS — D50.0 IRON DEFICIENCY ANEMIA DUE TO CHRONIC BLOOD LOSS: ICD-10-CM

## 2022-07-06 LAB
APPEARANCE UR: ABNORMAL
BACTERIA #/AREA URNS AUTO: ABNORMAL /HPF
BILIRUB UR QL STRIP.AUTO: NEGATIVE MG/DL
COLOR UR AUTO: YELLOW
GLUCOSE UR QL STRIP.AUTO: NORMAL MG/DL
HYALINE CASTS #/AREA URNS LPF: ABNORMAL /LPF
KETONES UR QL STRIP.AUTO: NEGATIVE MG/DL
LEUKOCYTE ESTERASE UR QL STRIP.AUTO: 75 UNIT/L
MUCOUS THREADS URNS QL MICRO: ABNORMAL /LPF
NITRITE UR QL STRIP.AUTO: NEGATIVE
PH UR STRIP.AUTO: 6.5 [PH]
PROT UR QL STRIP.AUTO: ABNORMAL MG/DL
RBC UR QL AUTO: ABNORMAL UNIT/L
SP GR UR STRIP.AUTO: 1.03
SQUAMOUS #/AREA URNS LPF: ABNORMAL /HPF
UNIDENT CRYS #/AREA URNS HPF: ABNORMAL /HPF
UROBILINOGEN UR STRIP-ACNC: ABNORMAL MG/DL
WBC #/AREA URNS AUTO: ABNORMAL /HPF

## 2022-07-06 PROCEDURE — 87088 URINE BACTERIA CULTURE: CPT

## 2022-07-06 PROCEDURE — 1160F PR REVIEW ALL MEDS BY PRESCRIBER/CLIN PHARMACIST DOCUMENTED: ICD-10-PCS | Mod: CPTII,,,

## 2022-07-06 PROCEDURE — 3008F PR BODY MASS INDEX (BMI) DOCUMENTED: ICD-10-PCS | Mod: CPTII,,,

## 2022-07-06 PROCEDURE — 3008F BODY MASS INDEX DOCD: CPT | Mod: CPTII,,,

## 2022-07-06 PROCEDURE — 99204 PR OFFICE/OUTPT VISIT, NEW, LEVL IV, 45-59 MIN: ICD-10-PCS | Mod: S$PBB,,,

## 2022-07-06 PROCEDURE — 3077F PR MOST RECENT SYSTOLIC BLOOD PRESSURE >= 140 MM HG: ICD-10-PCS | Mod: CPTII,,,

## 2022-07-06 PROCEDURE — 74019 RADEX ABDOMEN 2 VIEWS: CPT | Mod: TC,PN

## 2022-07-06 PROCEDURE — 81001 URINALYSIS AUTO W/SCOPE: CPT

## 2022-07-06 PROCEDURE — 99214 OFFICE O/P EST MOD 30 MIN: CPT | Mod: PBBFAC,PN

## 2022-07-06 PROCEDURE — 3077F SYST BP >= 140 MM HG: CPT | Mod: CPTII,,,

## 2022-07-06 PROCEDURE — 1160F RVW MEDS BY RX/DR IN RCRD: CPT | Mod: CPTII,,,

## 2022-07-06 PROCEDURE — 99204 OFFICE O/P NEW MOD 45 MIN: CPT | Mod: S$PBB,,,

## 2022-07-06 PROCEDURE — 1159F MED LIST DOCD IN RCRD: CPT | Mod: CPTII,,,

## 2022-07-06 PROCEDURE — 1159F PR MEDICATION LIST DOCUMENTED IN MEDICAL RECORD: ICD-10-PCS | Mod: CPTII,,,

## 2022-07-06 PROCEDURE — 3079F PR MOST RECENT DIASTOLIC BLOOD PRESSURE 80-89 MM HG: ICD-10-PCS | Mod: CPTII,,,

## 2022-07-06 PROCEDURE — 3079F DIAST BP 80-89 MM HG: CPT | Mod: CPTII,,,

## 2022-07-06 RX ORDER — AMLODIPINE BESYLATE 10 MG/1
10 TABLET ORAL DAILY
Qty: 30 TABLET | Refills: 0 | Status: SHIPPED | OUTPATIENT
Start: 2022-07-06 | End: 2022-07-20 | Stop reason: SDUPTHER

## 2022-07-06 NOTE — ASSESSMENT & PLAN NOTE
Was on amlodipine 10 mg previous to pregnancy and was switched to labetalol during pregnancy.  Will switch back to amlodipine 10 mg today and RTC in 2 weeks to assess efficacy

## 2022-07-06 NOTE — PROGRESS NOTES
Patient Name: Meghna Dozier   : 1995  MRN: [id number]     Subjective:   Patient ID: Meghna Dozier is a 26 y.o. female.    Chief Complaint:   Chief Complaint   Patient presents with    Hypertension    Umbilical Hernia        HPI: 2022:  Presenting to [facility] care.  She has not had PCP has been primarily managed by her gyn. She was previously on amlodipine 10 mg for this prior to her pregnancy. she is open to switching back to that medication, she is not breastfeeding or pregnant. She also has developed an umbilical hernia since pregnancy. It is getting progressively more painful and protruding more often.  It still retractable.  She will continue see gyn for annual Pap smears last 1 performed after childbirth in 2021.         ROS:  Review of Systems   Constitutional: Negative for chills, fever and weight loss.   HENT: Negative for ear discharge, nosebleeds and tinnitus.    Eyes: Negative for blurred vision, photophobia and pain.   Respiratory: Negative for cough, shortness of breath, wheezing and stridor.    Cardiovascular: Negative for chest pain, palpitations and orthopnea.   Gastrointestinal: Negative for abdominal pain, heartburn and nausea.        Hernia   Genitourinary: Negative for dysuria, frequency, hematuria and urgency.   Musculoskeletal: Negative for falls and myalgias.   Skin: Negative for itching and rash.   Neurological: Negative for dizziness, sensory change, speech change, focal weakness, seizures, weakness and headaches.   Endo/Heme/Allergies: Negative for environmental allergies. Does not bruise/bleed easily.   Psychiatric/Behavioral: Negative for hallucinations and suicidal ideas.      History:     Past Medical History:   Diagnosis Date    Anemia     Anxiety     Hypertension       Past Surgical History:   Procedure Laterality Date     SECTION       Family History   Problem Relation Age of Onset    Hypertension Father     Diabetes Father     Hypertension  Mother       Social History     Tobacco Use    Smoking status: Never Smoker    Smokeless tobacco: Never Used   Substance and Sexual Activity    Alcohol use: No    Drug use: No    Sexual activity: Yes     Partners: Male        Allergies:   Review of patient's allergies indicates:   Allergen Reactions    Vancomycin analogues Itching and Swelling     Swollen lips   Swollen lips        Objective:     Vitals:    07/06/22 0916   BP: (!) 152/84   Pulse: 71   Temp: 98.4 °F (36.9 °C)   SpO2: 98%   Weight: 76.2 kg (168 lb 1.6 oz)     Body mass index is 27.13 kg/m².     Physical Examination:   Physical Exam  Vitals reviewed.   Constitutional:       Appearance: Normal appearance. She is obese.   HENT:      Head: Normocephalic.      Right Ear: Tympanic membrane, ear canal and external ear normal.      Left Ear: Tympanic membrane, ear canal and external ear normal.      Nose: Nose normal.      Mouth/Throat:      Mouth: Mucous membranes are moist.      Pharynx: Oropharynx is clear.   Eyes:      Extraocular Movements: Extraocular movements intact.      Conjunctiva/sclera: Conjunctivae normal.      Pupils: Pupils are equal, round, and reactive to light.   Cardiovascular:      Rate and Rhythm: Normal rate and regular rhythm.      Pulses: Normal pulses.      Heart sounds: Normal heart sounds.   Pulmonary:      Effort: Pulmonary effort is normal.      Breath sounds: Normal breath sounds.   Abdominal:      General: Abdomen is flat. Bowel sounds are normal.      Palpations: Abdomen is soft.      Hernia: A hernia is present.   Musculoskeletal:         General: Normal range of motion.      Cervical back: Normal range of motion and neck supple.   Skin:     General: Skin is warm and dry.   Neurological:      General: No focal deficit present.      Mental Status: She is alert and oriented to person, place, and time.   Psychiatric:         Mood and Affect: Mood normal.         Behavior: Behavior normal.         Assessment:     Problem  List Items Addressed This Visit        Cardiac/Vascular    Essential hypertension (Chronic)    Overview     Low Sodium Diet (Dash Diet - less than 2 grams of sodium per day).  Monitor Blood Pressure daily and log. Report any consistent numbers greater than 140/90.  Smoking Cessation encouraged to aid in BP reduction.  Maintain healthy weight with goal BMI <30. Exercise 30 minutes per day 5 days per week             Current Assessment & Plan     Was on amlodipine 10 mg previous to pregnancy and was switched to labetalol during pregnancy.  Will switch back to amlodipine 10 mg today and RTC in 2 weeks to assess efficacy              Relevant Medications    amLODIPine (NORVASC) 10 MG tablet       Oncology    Iron deficiency anemia due to chronic blood loss    Current Assessment & Plan     Will assess with labs today           Relevant Orders    Ferritin    Iron and TIBC       GI    Umbilical hernia    Overview     Hernia since childbirth  Retractable, causing more pain lately            Current Assessment & Plan     XR today  Referral to surg clinic           Relevant Orders    X-Ray Abdomen Flat And Erect    Ambulatory referral/consult to General Surgery      Other Visit Diagnoses     Encounter for wellness examination    -  Primary    Relevant Orders    CBC Auto Differential    Comprehensive Metabolic Panel    Lipid Panel    T4, Free    TSH    Urinalysis    Hemoglobin A1C    Hepatitis Panel, Acute    SYPHILIS ANTIBODY (WITH REFLEX RPR)    Ferritin    Iron and TIBC          Plan:   Meghna was seen today for hypertension and umbilical hernia.    Diagnoses and all orders for this visit:    Encounter for wellness examination  -     CBC Auto Differential  -     Comprehensive Metabolic Panel  -     Lipid Panel  -     T4, Free  -     TSH  -     Urinalysis  -     Hemoglobin A1C  -     Cancel: OCCULT BLOOD FECAL IMMUNOASSAY  -     Hepatitis Panel, Acute  -     SYPHILIS ANTIBODY (WITH REFLEX RPR)  -     Ferritin  -     Iron and  TIBC    Essential hypertension  -     amLODIPine (NORVASC) 10 MG tablet; Take 1 tablet (10 mg total) by mouth once daily.    Iron deficiency anemia due to chronic blood loss  -     Ferritin  -     Iron and TIBC    Umbilical hernia without obstruction and without gangrene  -     X-Ray Abdomen Flat And Erect  -     Ambulatory referral/consult to General Surgery; Future       Follow up in about 2 weeks (around 7/20/2022) for BP recheck, review labs.       This note was created with the assistance of a voice recognition software or phone dictation. There may be transcription errors as a result of using this technology however minimal. Effort has been made to assure accuracy of transcription but any obvious errors or omissions should be clarified with the author of the document

## 2022-07-08 LAB — BACTERIA UR CULT: NO GROWTH

## 2022-07-20 ENCOUNTER — OFFICE VISIT (OUTPATIENT)
Dept: FAMILY MEDICINE | Facility: CLINIC | Age: 27
End: 2022-07-20
Payer: MEDICAID

## 2022-07-20 VITALS
TEMPERATURE: 98 F | HEART RATE: 72 BPM | WEIGHT: 167.44 LBS | BODY MASS INDEX: 27.03 KG/M2 | SYSTOLIC BLOOD PRESSURE: 130 MMHG | DIASTOLIC BLOOD PRESSURE: 83 MMHG | OXYGEN SATURATION: 100 %

## 2022-07-20 DIAGNOSIS — Z00.00 ENCOUNTER FOR WELLNESS EXAMINATION: ICD-10-CM

## 2022-07-20 DIAGNOSIS — K42.9 UMBILICAL HERNIA WITHOUT OBSTRUCTION AND WITHOUT GANGRENE: Primary | ICD-10-CM

## 2022-07-20 DIAGNOSIS — D50.0 IRON DEFICIENCY ANEMIA DUE TO CHRONIC BLOOD LOSS: ICD-10-CM

## 2022-07-20 DIAGNOSIS — I10 ESSENTIAL HYPERTENSION: Chronic | ICD-10-CM

## 2022-07-20 LAB
ALBUMIN SERPL-MCNC: 3.5 GM/DL (ref 3.5–5)
ALBUMIN/GLOB SERPL: 0.9 RATIO (ref 1.1–2)
ALP SERPL-CCNC: 65 UNIT/L (ref 40–150)
ALT SERPL-CCNC: 13 UNIT/L (ref 0–55)
APPEARANCE UR: CLEAR
AST SERPL-CCNC: 11 UNIT/L (ref 5–34)
BACTERIA #/AREA URNS AUTO: ABNORMAL /HPF
BASOPHILS # BLD AUTO: 0.06 X10(3)/MCL (ref 0–0.2)
BASOPHILS NFR BLD AUTO: 0.9 %
BILIRUB UR QL STRIP.AUTO: NEGATIVE MG/DL
BILIRUBIN DIRECT+TOT PNL SERPL-MCNC: 0.6 MG/DL
BUN SERPL-MCNC: 13.3 MG/DL (ref 7–18.7)
C TRACH DNA SPEC QL NAA+PROBE: NOT DETECTED
CALCIUM SERPL-MCNC: 9.5 MG/DL (ref 8.4–10.2)
CHLORIDE SERPL-SCNC: 102 MMOL/L (ref 98–107)
CHOLEST SERPL-MCNC: 176 MG/DL
CHOLEST/HDLC SERPL: 3 {RATIO} (ref 0–5)
CO2 SERPL-SCNC: 27 MMOL/L (ref 22–29)
COLOR UR AUTO: ABNORMAL
CREAT SERPL-MCNC: 0.78 MG/DL (ref 0.55–1.02)
EOSINOPHIL # BLD AUTO: 0.08 X10(3)/MCL (ref 0–0.9)
EOSINOPHIL NFR BLD AUTO: 1.2 %
ERYTHROCYTE [DISTWIDTH] IN BLOOD BY AUTOMATED COUNT: 12.6 % (ref 11.5–17)
EST. AVERAGE GLUCOSE BLD GHB EST-MCNC: 99.7 MG/DL
FERRITIN SERPL-MCNC: 15.58 NG/ML (ref 4.63–204)
GLOBULIN SER-MCNC: 3.9 GM/DL (ref 2.4–3.5)
GLUCOSE SERPL-MCNC: 81 MG/DL (ref 74–100)
GLUCOSE UR QL STRIP.AUTO: NORMAL MG/DL
HAV IGM SERPL QL IA: NONREACTIVE
HBA1C MFR BLD: 5.1 %
HBV CORE IGM SERPL QL IA: NONREACTIVE
HBV SURFACE AG SERPL QL IA: NONREACTIVE
HCT VFR BLD AUTO: 41.1 % (ref 37–47)
HCV AB SERPL QL IA: NONREACTIVE
HDLC SERPL-MCNC: 57 MG/DL (ref 35–60)
HGB BLD-MCNC: 13.4 GM/DL (ref 12–16)
HIV 1+2 AB+HIV1 P24 AG SERPL QL IA: NONREACTIVE
HYALINE CASTS #/AREA URNS LPF: ABNORMAL /LPF
IMM GRANULOCYTES # BLD AUTO: 0.02 X10(3)/MCL (ref 0–0.04)
IMM GRANULOCYTES NFR BLD AUTO: 0.3 %
IRON SATN MFR SERPL: 19 % (ref 20–50)
IRON SERPL-MCNC: 66 UG/DL (ref 50–170)
KETONES UR QL STRIP.AUTO: NEGATIVE MG/DL
LDLC SERPL CALC-MCNC: 110 MG/DL (ref 50–140)
LEUKOCYTE ESTERASE UR QL STRIP.AUTO: NEGATIVE UNIT/L
LYMPHOCYTES # BLD AUTO: 1.66 X10(3)/MCL (ref 0.6–4.6)
LYMPHOCYTES NFR BLD AUTO: 24 %
MCH RBC QN AUTO: 28.3 PG (ref 27–31)
MCHC RBC AUTO-ENTMCNC: 32.6 MG/DL (ref 33–36)
MCV RBC AUTO: 86.7 FL (ref 80–94)
MONOCYTES # BLD AUTO: 0.63 X10(3)/MCL (ref 0.1–1.3)
MONOCYTES NFR BLD AUTO: 9.1 %
MUCOUS THREADS URNS QL MICRO: ABNORMAL /LPF
N GONORRHOEA DNA SPEC QL NAA+PROBE: NOT DETECTED
NEUTROPHILS # BLD AUTO: 4.5 X10(3)/MCL (ref 2.1–9.2)
NEUTROPHILS NFR BLD AUTO: 64.5 %
NITRITE UR QL STRIP.AUTO: NEGATIVE
NRBC BLD AUTO-RTO: 0 %
PH UR STRIP.AUTO: 7 [PH]
PLATELET # BLD AUTO: 316 X10(3)/MCL (ref 130–400)
PMV BLD AUTO: 11.1 FL (ref 7.4–10.4)
POTASSIUM SERPL-SCNC: 4.9 MMOL/L (ref 3.5–5.1)
PROT SERPL-MCNC: 7.4 GM/DL (ref 6.4–8.3)
PROT UR QL STRIP.AUTO: NEGATIVE MG/DL
RBC # BLD AUTO: 4.74 X10(6)/MCL (ref 4.2–5.4)
RBC #/AREA URNS AUTO: ABNORMAL /HPF
RBC UR QL AUTO: NEGATIVE UNIT/L
SODIUM SERPL-SCNC: 136 MMOL/L (ref 136–145)
SP GR UR STRIP.AUTO: 1.02
SQUAMOUS #/AREA URNS LPF: ABNORMAL /HPF
T PALLIDUM AB SER QL: NONREACTIVE
T4 FREE SERPL-MCNC: 0.97 NG/DL (ref 0.7–1.48)
TIBC SERPL-MCNC: 281 UG/DL (ref 70–310)
TIBC SERPL-MCNC: 347 UG/DL (ref 250–450)
TRANSFERRIN SERPL-MCNC: 308 MG/DL (ref 180–382)
TRIGL SERPL-MCNC: 45 MG/DL (ref 37–140)
TSH SERPL-ACNC: 0.59 UIU/ML (ref 0.35–4.94)
UROBILINOGEN UR STRIP-ACNC: NORMAL MG/DL
VLDLC SERPL CALC-MCNC: 9 MG/DL
WBC # SPEC AUTO: 6.9 X10(3)/MCL (ref 4.5–11.5)
WBC #/AREA URNS AUTO: ABNORMAL /HPF

## 2022-07-20 PROCEDURE — 84443 ASSAY THYROID STIM HORMONE: CPT

## 2022-07-20 PROCEDURE — 3079F PR MOST RECENT DIASTOLIC BLOOD PRESSURE 80-89 MM HG: ICD-10-PCS | Mod: CPTII,,,

## 2022-07-20 PROCEDURE — 3075F SYST BP GE 130 - 139MM HG: CPT | Mod: CPTII,,,

## 2022-07-20 PROCEDURE — 87389 HIV-1 AG W/HIV-1&-2 AB AG IA: CPT

## 2022-07-20 PROCEDURE — 3075F PR MOST RECENT SYSTOLIC BLOOD PRESS GE 130-139MM HG: ICD-10-PCS | Mod: CPTII,,,

## 2022-07-20 PROCEDURE — 83540 ASSAY OF IRON: CPT

## 2022-07-20 PROCEDURE — 87491 CHLMYD TRACH DNA AMP PROBE: CPT

## 2022-07-20 PROCEDURE — 80061 LIPID PANEL: CPT

## 2022-07-20 PROCEDURE — 3079F DIAST BP 80-89 MM HG: CPT | Mod: CPTII,,,

## 2022-07-20 PROCEDURE — 82728 ASSAY OF FERRITIN: CPT

## 2022-07-20 PROCEDURE — 86780 TREPONEMA PALLIDUM: CPT

## 2022-07-20 PROCEDURE — 36415 COLL VENOUS BLD VENIPUNCTURE: CPT

## 2022-07-20 PROCEDURE — 84439 ASSAY OF FREE THYROXINE: CPT

## 2022-07-20 PROCEDURE — 3008F PR BODY MASS INDEX (BMI) DOCUMENTED: ICD-10-PCS | Mod: CPTII,,,

## 2022-07-20 PROCEDURE — 87661 TRICHOMONAS VAGINALIS AMPLIF: CPT

## 2022-07-20 PROCEDURE — 87591 N.GONORRHOEAE DNA AMP PROB: CPT

## 2022-07-20 PROCEDURE — 83036 HEMOGLOBIN GLYCOSYLATED A1C: CPT

## 2022-07-20 PROCEDURE — 85025 COMPLETE CBC W/AUTO DIFF WBC: CPT

## 2022-07-20 PROCEDURE — 1160F PR REVIEW ALL MEDS BY PRESCRIBER/CLIN PHARMACIST DOCUMENTED: ICD-10-PCS | Mod: CPTII,,,

## 2022-07-20 PROCEDURE — 99213 OFFICE O/P EST LOW 20 MIN: CPT | Mod: PBBFAC,PN

## 2022-07-20 PROCEDURE — 3008F BODY MASS INDEX DOCD: CPT | Mod: CPTII,,,

## 2022-07-20 PROCEDURE — 1159F PR MEDICATION LIST DOCUMENTED IN MEDICAL RECORD: ICD-10-PCS | Mod: CPTII,,,

## 2022-07-20 PROCEDURE — 80074 ACUTE HEPATITIS PANEL: CPT

## 2022-07-20 PROCEDURE — 81001 URINALYSIS AUTO W/SCOPE: CPT

## 2022-07-20 PROCEDURE — 30000890 MAYO GENERIC ORDERABLE

## 2022-07-20 PROCEDURE — 99214 OFFICE O/P EST MOD 30 MIN: CPT | Mod: S$PBB,,,

## 2022-07-20 PROCEDURE — 80053 COMPREHEN METABOLIC PANEL: CPT

## 2022-07-20 PROCEDURE — 1160F RVW MEDS BY RX/DR IN RCRD: CPT | Mod: CPTII,,,

## 2022-07-20 PROCEDURE — 1159F MED LIST DOCD IN RCRD: CPT | Mod: CPTII,,,

## 2022-07-20 PROCEDURE — 99214 PR OFFICE/OUTPT VISIT, EST, LEVL IV, 30-39 MIN: ICD-10-PCS | Mod: S$PBB,,,

## 2022-07-20 RX ORDER — AMLODIPINE BESYLATE 10 MG/1
10 TABLET ORAL DAILY
Qty: 90 TABLET | Refills: 1 | Status: SHIPPED | OUTPATIENT
Start: 2022-07-20 | End: 2022-08-03 | Stop reason: SDUPTHER

## 2022-07-20 NOTE — PROGRESS NOTES
Patient Name: Meghna Dozier   : 1995  MRN: [id number]     Subjective:   Patient ID: Meghna Dozier is a 26 y.o. female.    Chief Complaint:   Chief Complaint   Patient presents with    Follow-up        HPI: 2022: her routine wellness labs were never collected, will recollect those today. Switch to 10mg amlodipine has been working well for her. She is monitoring at home with readings in the 120s over 80s, states she has never seen numbers like that. She has yet to hear from surgery clinic about her hernia.      2022:  Presenting to establish care.  She has not had PCP has been primarily managed by her gyn. She was previously on amlodipine 10 mg for this prior to her pregnancy. she is open to switching back to that medication, she is not breastfeeding or pregnant. She also has developed an umbilical hernia since pregnancy. It is getting progressively more painful and protruding more often.  It still retractable.  She will continue see gyn for annual Pap smears last 1 performed after childbirth in 2021.      ROS:  Review of Systems   All other systems reviewed and are negative.     History:     Past Medical History:   Diagnosis Date    Anemia     Anxiety     Hypertension       Past Surgical History:   Procedure Laterality Date     SECTION       Family History   Problem Relation Age of Onset    Hypertension Father     Diabetes Father     Hypertension Mother       Social History     Tobacco Use    Smoking status: Never Smoker    Smokeless tobacco: Never Used   Substance and Sexual Activity    Alcohol use: No    Drug use: No    Sexual activity: Yes     Partners: Male        Allergies:   Review of patient's allergies indicates:   Allergen Reactions    Vancomycin analogues Itching and Swelling     Swollen lips   Swollen lips        Objective:     Vitals:    22 0722   BP: 130/83   Pulse: 72   Temp: 98.1 °F (36.7 °C)   SpO2: 100%   Weight: 76 kg (167 lb 7 oz)     Body  mass index is 27.03 kg/m².     Physical Examination:   Physical Exam  Vitals reviewed.   Constitutional:       Appearance: Normal appearance. She is normal weight.   HENT:      Head: Normocephalic.      Right Ear: Tympanic membrane, ear canal and external ear normal.      Left Ear: Tympanic membrane, ear canal and external ear normal.      Nose: Nose normal.      Mouth/Throat:      Mouth: Mucous membranes are moist.      Pharynx: Oropharynx is clear.   Eyes:      Extraocular Movements: Extraocular movements intact.      Conjunctiva/sclera: Conjunctivae normal.      Pupils: Pupils are equal, round, and reactive to light.   Cardiovascular:      Rate and Rhythm: Normal rate and regular rhythm.      Pulses: Normal pulses.      Heart sounds: Normal heart sounds.   Pulmonary:      Effort: Pulmonary effort is normal.      Breath sounds: Normal breath sounds.   Abdominal:      General: Abdomen is flat. Bowel sounds are normal.      Palpations: Abdomen is soft.      Hernia: A hernia is present. Hernia is present in the umbilical area.   Musculoskeletal:         General: Normal range of motion.      Cervical back: Normal range of motion and neck supple.   Skin:     General: Skin is warm and dry.   Neurological:      General: No focal deficit present.      Mental Status: She is alert and oriented to person, place, and time.   Psychiatric:         Mood and Affect: Mood normal.         Behavior: Behavior normal.         Assessment:     Problem List Items Addressed This Visit        Cardiac/Vascular    Essential hypertension (Chronic)    Overview     Low Sodium Diet (Dash Diet - less than 2 grams of sodium per day).  Monitor Blood Pressure daily and log. Report any consistent numbers greater than 140/90.  Smoking Cessation encouraged to aid in BP reduction.  Maintain healthy weight with goal BMI <30. Exercise 30 minutes per day 5 days per week             Current Assessment & Plan     Continue amlodipine            Relevant  Medications    amLODIPine (NORVASC) 10 MG tablet       Oncology    Iron deficiency anemia due to chronic blood loss    Current Assessment & Plan     Lab work wasn't drawn previously, patient had work. Will assess today.           Relevant Orders    Ferritin    Iron and TIBC       GI    Umbilical hernia - Primary    Overview     Hernia since 9/21 post delivery   Retractable, causing more pain lately            Current Assessment & Plan     Keep appointment with surgery once scheduled              Other Visit Diagnoses     Encounter for wellness examination        Relevant Orders    Hepatitis Panel, Acute    SYPHILIS ANTIBODY (WITH REFLEX RPR)    CBC Auto Differential    Comprehensive Metabolic Panel    Lipid Panel    T4, Free    TSH    Urinalysis    Hemoglobin A1C    HIV 1/2 Ag/Ab (4th Gen)    Trichomonas Prep Wet Mount    Chlamydia/GC, PCR          Plan:   Meghna was seen today for follow-up.    Diagnoses and all orders for this visit:    Umbilical hernia without obstruction and without gangrene    Essential hypertension  -     amLODIPine (NORVASC) 10 MG tablet; Take 1 tablet (10 mg total) by mouth once daily.    Iron deficiency anemia due to chronic blood loss  -     Ferritin  -     Iron and TIBC    Encounter for wellness examination  -     Hepatitis Panel, Acute  -     SYPHILIS ANTIBODY (WITH REFLEX RPR)  -     CBC Auto Differential  -     Comprehensive Metabolic Panel  -     Lipid Panel  -     T4, Free  -     TSH  -     Urinalysis  -     Hemoglobin A1C  -     HIV 1/2 Ag/Ab (4th Gen)  -     Trichomonas Prep Wet [place]  -     Chlamydia/GC, PCR       Follow up in about 3 months (around 10/20/2022) for BP recheck, review labs.       This note was created with the assistance of a voice recognition software or phone dictation. There may be transcription errors as a result of using this technology however minimal. Effort has been made to assure accuracy of transcription but any obvious errors or omissions should be  clarified with the author of the document

## 2022-07-20 NOTE — ASSESSMENT & PLAN NOTE
Continue amlodipine   
Keep appointment with surgery once scheduled   
Lab work wasn't drawn previously, patient had work. Will assess today.  
4 = completely dependent

## 2022-07-22 LAB
MAYO GENERIC ORDERABLE RESULT: NORMAL
PATH REV: NORMAL

## 2022-07-28 ENCOUNTER — OFFICE VISIT (OUTPATIENT)
Dept: SURGERY | Facility: CLINIC | Age: 27
End: 2022-07-28
Payer: MEDICAID

## 2022-07-28 VITALS
DIASTOLIC BLOOD PRESSURE: 82 MMHG | SYSTOLIC BLOOD PRESSURE: 142 MMHG | RESPIRATION RATE: 20 BRPM | HEIGHT: 66 IN | HEART RATE: 86 BPM | TEMPERATURE: 99 F | WEIGHT: 166 LBS | BODY MASS INDEX: 26.68 KG/M2 | OXYGEN SATURATION: 100 %

## 2022-07-28 DIAGNOSIS — K42.9 UMBILICAL HERNIA WITHOUT OBSTRUCTION AND WITHOUT GANGRENE: ICD-10-CM

## 2022-07-28 DIAGNOSIS — Z01.818 PRE-OP TESTING: ICD-10-CM

## 2022-07-28 PROCEDURE — 99215 OFFICE O/P EST HI 40 MIN: CPT | Mod: PBBFAC

## 2022-07-28 RX ORDER — SODIUM CHLORIDE 9 MG/ML
INJECTION, SOLUTION INTRAVENOUS CONTINUOUS
Status: CANCELLED | OUTPATIENT
Start: 2022-07-28

## 2022-07-28 RX ORDER — HEPARIN SODIUM 5000 [USP'U]/ML
5000 INJECTION, SOLUTION INTRAVENOUS; SUBCUTANEOUS
Status: CANCELLED | OUTPATIENT
Start: 2022-07-28 | End: 2022-07-29

## 2022-07-28 NOTE — PROGRESS NOTES
Chief complaint: Umbilical Hernia     HPI:   Meghna Dozier is a 26 y.o. female with PMHx significant for  (21) and hypertension complains of a bulge in the umbilical region. The bulge has always been present but has grown in size since her pregnancy. She reports some pain when eating. Pt denies nausea, vomiting, or constipation.    PMHx:  has a past medical history of Anemia, Anxiety, and Hypertension.  PSHx:  has a past surgical history that includes  section.  FamHx: family history includes Diabetes in her father; Hypertension in her father and mother.  SocHx:  reports that she has never smoked. She has never used smokeless tobacco. She reports that she does not drink alcohol and does not use drugs.    Physical Exam:  Vitals:    22 1211   BP: (!) 142/82   Pulse: 86   Resp: 20   Temp: 98.8 °F (37.1 °C)     General: awake, alert, cooperative, in no acute distress. Mood/affect normal.   HEENT: NCAT  Lungs: No increased WOB  Abd: soft, non-tender, non-distended, reducible umbilical hernia with 4 cm defect, no overlying skin changes.  Extremities: MAEW    Assessment and Plan:  26F with history of HTN who presents with an umbilical hernia.    - Plan for laparoscopic umbilical hernia repair with hybrid approach on 22  - Consented at clinic, risks and benefits discussed    Odin Kraus MS3    Patient seen and examined with Student Doctor Nayana. Agree with assessment and plan as documented. Above note reviewed and edited as necessary.    Tisha Caraballo MD  [employer] General Surgery, PGY-3

## 2022-07-28 NOTE — PROGRESS NOTES
Seen by Dr. Caraballo.  Surgery consent signed, witnessed and forwarded to front office.  Surgery 8/17/22.  Steve cloth wipes given to patient with instruction sheet.  Verbalized understanding.  Post op appt scheduled 9/1/22@1100.

## 2022-07-29 ENCOUNTER — ANESTHESIA EVENT (OUTPATIENT)
Dept: SURGERY | Facility: HOSPITAL | Age: 27
End: 2022-07-29
Payer: MEDICAID

## 2022-07-29 NOTE — ANESTHESIA PREPROCEDURE EVALUATION
07/29/2022  Meghna Dozier is a 26 y.o., female with PMHx of HTN, anxiety presents for laparoscopic umbilical hernia repair.    COVID STATUS: VACCINE X 2 (PFIZER, LASTLY 9/4/21)  BETA-BLOCKER: NONE    PAT NURSE PHONE INTERVIEW 8/12/22    PROBLEM LIST:  -  UMBILICAL HERNIA  -  UPT STATUS  -  HTN  -  ANXIETY (NO MEDs)  -  PCOS    AM Rx DOS: AMLODIPINE    ORDERS -   SURGEON: 12/20/17 CXR, EKG; 7/20/22 CBC, CMP, A1c, TFT's; NO NEW ORDERS  ANESTHESIA: UPT    Pre-op Assessment    I have reviewed the NPO Status.      Review of Systems  Anesthesia Hx:  No previous Anesthesia    Social:  Non-Smoker    Cardiovascular:   Hypertension, well controlled    Pulmonary:  Pulmonary Normal    Renal/:  Renal/ Normal     Hepatic/GI:  Hepatic/GI Normal    Neurological:  Neurology Normal    Endocrine:  Endocrine Normal      Vitals:    08/31/22 0539 08/31/22 0540 08/31/22 0621 08/31/22 0635   BP:  134/87 134/87 129/72   BP Location:    Right arm   Patient Position:    Lying   Pulse:  80  82   Resp:    20   Temp:  36.9 °C (98.5 °F)  36.3 °C (97.3 °F)   TempSrc:  Oral  Temporal   SpO2:  100%  100%   Weight: 74.8 kg (164 lb 14.5 oz)            Physical Exam  General: Alert, Cooperative and Well nourished    Airway:  Mallampati: II   Mouth Opening: Normal  TM Distance: Normal  Tongue: Normal  Neck ROM: Normal ROM    Dental:  Intact    Chest/Lungs:  Clear to auscultation, Normal Respiratory Rate    Heart:  Rate: Normal  Rhythm: Regular Rhythm  Sounds: Normal       Latest Reference Range & Units 08/31/22 05:53   Preg Test, Ur Negative  Negative     Lab Results   Component Value Date    WBC 7.1 08/25/2022    HGB 13.7 08/25/2022    HCT 42.3 08/25/2022    MCV 90.8 08/25/2022     08/25/2022       CMP  Sodium   Date Value Ref Range Status   07/23/2021 134 (L) 136 - 145 mmol/L Final     Sodium Level   Date Value Ref Range Status    08/25/2022 138 136 - 145 mmol/L Final     Potassium   Date Value Ref Range Status   07/23/2021 3.9 3.5 - 5.1 mmol/L Final     Potassium Level   Date Value Ref Range Status   08/25/2022 4.5 3.5 - 5.1 mmol/L Final     Chloride   Date Value Ref Range Status   07/23/2021 103 95 - 110 mmol/L Final     CO2   Date Value Ref Range Status   07/23/2021 25 23 - 29 mmol/L Final     Carbon Dioxide   Date Value Ref Range Status   08/25/2022 23 22 - 29 mmol/L Final     Glucose   Date Value Ref Range Status   07/23/2021 77 70 - 110 mg/dL Final     BUN   Date Value Ref Range Status   07/23/2021 3 (L) 7 - 17 mg/dL Final     Blood Urea Nitrogen   Date Value Ref Range Status   08/25/2022 8.2 7.0 - 18.7 mg/dL Final     Creatinine   Date Value Ref Range Status   08/25/2022 0.82 0.55 - 1.02 mg/dL Final   07/23/2021 0.6 0.5 - 1.4 mg/dL Final     Calcium   Date Value Ref Range Status   07/23/2021 9.3 8.7 - 10.5 mg/dL Final     Calcium Level Total   Date Value Ref Range Status   08/25/2022 9.3 8.4 - 10.2 mg/dL Final     Total Protein   Date Value Ref Range Status   07/23/2021 7.1 6.0 - 8.4 g/dL Final     Albumin   Date Value Ref Range Status   07/23/2021 3.5 3.5 - 5.2 g/dL Final     Albumin Level   Date Value Ref Range Status   08/25/2022 3.6 3.5 - 5.0 gm/dL Final     Total Bilirubin   Date Value Ref Range Status   07/23/2021 0.5 0.1 - 1.0 mg/dL Final     Comment:     For infants and newborns, interpretation of results should be based  on gestational age, weight and in agreement with clinical  observations.    Premature Infant recommended reference ranges:  Up to 24 hours.............<8.0 mg/dL  Up to 48 hours............<12.0 mg/dL  3-5 days..................<15.0 mg/dL  6-29 days.................<15.0 mg/dL       Bilirubin Total   Date Value Ref Range Status   08/25/2022 0.8 <=1.5 mg/dL Final     Alkaline Phosphatase   Date Value Ref Range Status   08/25/2022 74 40 - 150 unit/L Final   07/23/2021 118 38 - 126 U/L Final     AST   Date  Value Ref Range Status   07/23/2021 22 15 - 46 U/L Final     Aspartate Aminotransferase   Date Value Ref Range Status   08/25/2022 12 5 - 34 unit/L Final     ALT   Date Value Ref Range Status   07/23/2021 15 10 - 44 U/L Final     Alanine Aminotransferase   Date Value Ref Range Status   08/25/2022 13 0 - 55 unit/L Final     Anion Gap   Date Value Ref Range Status   07/23/2021 6 (L) 8 - 16 mmol/L Final     eGFR if    Date Value Ref Range Status   07/23/2021 >60.0 >60 mL/min/1.73 m^2 Final     eGFR if non    Date Value Ref Range Status   07/23/2021 >60.0 >60 mL/min/1.73 m^2 Final     Comment:     Calculation used to obtain the estimated glomerular filtration  rate (eGFR) is the CKD-EPI equation.        Estimated GFR-Non    Date Value Ref Range Status   09/12/2021 >60 mL/min/1.73 m2 Final           Anesthesia Plan  Type of Anesthesia, risks & benefits discussed:    Anesthesia Type: Gen ETT  Intra-op Monitoring Plan: Standard ASA Monitors  Post Op Pain Control Plan: IV/PO Opioids PRN  Induction:  IV  Airway Plan: Direct  Informed Consent: Informed consent signed with the Patient and all parties understand the risks and agree with anesthesia plan.  All questions answered.   ASA Score: 2  Day of Surgery Review of History & Physical: H&P Update referred to the surgeon/provider.    Ready For Surgery From Anesthesia Perspective.     .

## 2022-08-01 NOTE — PROGRESS NOTES
I have reviewed the notes, assessments, and/or procedures performed by the resident, I concur with her/his documentation of Meghna Dozier.     Keshia Gu MD

## 2022-08-03 DIAGNOSIS — I10 ESSENTIAL HYPERTENSION: Chronic | ICD-10-CM

## 2022-08-03 RX ORDER — AMLODIPINE BESYLATE 10 MG/1
10 TABLET ORAL DAILY
Qty: 90 TABLET | Refills: 1 | Status: SHIPPED | OUTPATIENT
Start: 2022-08-03 | End: 2022-11-16 | Stop reason: SDUPTHER

## 2022-08-10 ENCOUNTER — PATIENT MESSAGE (OUTPATIENT)
Dept: FAMILY MEDICINE | Facility: CLINIC | Age: 27
End: 2022-08-10
Payer: MEDICAID

## 2022-08-10 RX ORDER — LORATADINE 10 MG/1
10 TABLET ORAL DAILY
Qty: 90 TABLET | Refills: 0 | Status: SHIPPED | OUTPATIENT
Start: 2022-08-10 | End: 2022-11-16 | Stop reason: SDUPTHER

## 2022-08-10 RX ORDER — BENZONATATE 100 MG/1
100 CAPSULE ORAL 3 TIMES DAILY PRN
Qty: 30 CAPSULE | Refills: 0 | Status: SHIPPED | OUTPATIENT
Start: 2022-08-10 | End: 2022-08-20

## 2022-08-10 RX ORDER — FLUTICASONE PROPIONATE 50 MCG
1 SPRAY, SUSPENSION (ML) NASAL DAILY
Qty: 18.2 ML | Refills: 3 | Status: SHIPPED | OUTPATIENT
Start: 2022-08-10 | End: 2022-11-16 | Stop reason: SDUPTHER

## 2022-08-11 ENCOUNTER — PATIENT MESSAGE (OUTPATIENT)
Dept: FAMILY MEDICINE | Facility: CLINIC | Age: 27
End: 2022-08-11
Payer: MEDICAID

## 2022-08-17 ENCOUNTER — ANESTHESIA (OUTPATIENT)
Dept: SURGERY | Facility: HOSPITAL | Age: 27
End: 2022-08-17
Payer: MEDICAID

## 2022-08-25 ENCOUNTER — HOSPITAL ENCOUNTER (EMERGENCY)
Facility: HOSPITAL | Age: 27
Discharge: HOME | End: 2022-08-25
Attending: STUDENT IN AN ORGANIZED HEALTH CARE EDUCATION/TRAINING PROGRAM
Payer: MEDICAID

## 2022-08-25 VITALS
HEART RATE: 70 BPM | OXYGEN SATURATION: 99 % | SYSTOLIC BLOOD PRESSURE: 124 MMHG | RESPIRATION RATE: 18 BRPM | DIASTOLIC BLOOD PRESSURE: 83 MMHG | TEMPERATURE: 99 F

## 2022-08-25 DIAGNOSIS — Z51.81 MEDICATION MONITORING ENCOUNTER: Primary | ICD-10-CM

## 2022-08-25 DIAGNOSIS — K59.00 CONSTIPATION, UNSPECIFIED CONSTIPATION TYPE: ICD-10-CM

## 2022-08-25 LAB
ALBUMIN SERPL-MCNC: 3.6 GM/DL (ref 3.5–5)
ALBUMIN/GLOB SERPL: 0.9 RATIO (ref 1.1–2)
ALP SERPL-CCNC: 74 UNIT/L (ref 40–150)
ALT SERPL-CCNC: 13 UNIT/L (ref 0–55)
APPEARANCE UR: CLEAR
AST SERPL-CCNC: 12 UNIT/L (ref 5–34)
B-HCG SERPL QL: NEGATIVE
BACTERIA #/AREA URNS AUTO: ABNORMAL /HPF
BASOPHILS # BLD AUTO: 0.05 X10(3)/MCL (ref 0–0.2)
BASOPHILS NFR BLD AUTO: 0.7 %
BILIRUB UR QL STRIP.AUTO: NEGATIVE MG/DL
BILIRUBIN DIRECT+TOT PNL SERPL-MCNC: 0.8 MG/DL
BUN SERPL-MCNC: 8.2 MG/DL (ref 7–18.7)
CALCIUM SERPL-MCNC: 9.3 MG/DL (ref 8.4–10.2)
CHLORIDE SERPL-SCNC: 108 MMOL/L (ref 98–107)
CO2 SERPL-SCNC: 23 MMOL/L (ref 22–29)
COLOR UR AUTO: YELLOW
CREAT SERPL-MCNC: 0.82 MG/DL (ref 0.55–1.02)
EOSINOPHIL # BLD AUTO: 0.09 X10(3)/MCL (ref 0–0.9)
EOSINOPHIL NFR BLD AUTO: 1.3 %
ERYTHROCYTE [DISTWIDTH] IN BLOOD BY AUTOMATED COUNT: 13.8 % (ref 11.5–17)
GFR SERPLBLD CREATININE-BSD FMLA CKD-EPI: >60 MLS/MIN/1.73/M2
GLOBULIN SER-MCNC: 3.8 GM/DL (ref 2.4–3.5)
GLUCOSE SERPL-MCNC: 81 MG/DL (ref 74–100)
GLUCOSE UR QL STRIP.AUTO: NEGATIVE MG/DL
HCT VFR BLD AUTO: 42.3 % (ref 37–47)
HGB BLD-MCNC: 13.7 GM/DL (ref 12–16)
IMM GRANULOCYTES # BLD AUTO: 0.02 X10(3)/MCL (ref 0–0.04)
IMM GRANULOCYTES NFR BLD AUTO: 0.3 %
KETONES UR QL STRIP.AUTO: ABNORMAL MG/DL
LACTATE SERPL-SCNC: 1.7 MMOL/L (ref 0.5–2.2)
LEUKOCYTE ESTERASE UR QL STRIP.AUTO: ABNORMAL UNIT/L
LYMPHOCYTES # BLD AUTO: 1.98 X10(3)/MCL (ref 0.6–4.6)
LYMPHOCYTES NFR BLD AUTO: 27.7 %
MCH RBC QN AUTO: 29.4 PG (ref 27–31)
MCHC RBC AUTO-ENTMCNC: 32.4 MG/DL (ref 33–36)
MCV RBC AUTO: 90.8 FL (ref 80–94)
MONOCYTES # BLD AUTO: 0.74 X10(3)/MCL (ref 0.1–1.3)
MONOCYTES NFR BLD AUTO: 10.4 %
NEUTROPHILS # BLD AUTO: 4.3 X10(3)/MCL (ref 2.1–9.2)
NEUTROPHILS NFR BLD AUTO: 59.6 %
NITRITE UR QL STRIP.AUTO: NEGATIVE
NRBC BLD AUTO-RTO: 0 %
PH UR STRIP.AUTO: 8 [PH]
PLATELET # BLD AUTO: 318 X10(3)/MCL (ref 130–400)
PMV BLD AUTO: 11.1 FL (ref 7.4–10.4)
POTASSIUM SERPL-SCNC: 4.5 MMOL/L (ref 3.5–5.1)
PROT SERPL-MCNC: 7.4 GM/DL (ref 6.4–8.3)
PROT UR QL STRIP.AUTO: ABNORMAL MG/DL
RBC # BLD AUTO: 4.66 X10(6)/MCL (ref 4.2–5.4)
RBC #/AREA URNS AUTO: <5 /HPF
RBC UR QL AUTO: NEGATIVE UNIT/L
SODIUM SERPL-SCNC: 138 MMOL/L (ref 136–145)
SP GR UR STRIP.AUTO: 1.02 (ref 1–1.03)
SQUAMOUS #/AREA URNS AUTO: 10 /HPF
UROBILINOGEN UR STRIP-ACNC: 1 MG/DL
WBC # SPEC AUTO: 7.1 X10(3)/MCL (ref 4.5–11.5)
WBC #/AREA URNS AUTO: <5 /HPF

## 2022-08-25 PROCEDURE — 83605 ASSAY OF LACTIC ACID: CPT | Performed by: PHYSICIAN ASSISTANT

## 2022-08-25 PROCEDURE — 81025 URINE PREGNANCY TEST: CPT | Performed by: PHYSICIAN ASSISTANT

## 2022-08-25 PROCEDURE — 81001 URINALYSIS AUTO W/SCOPE: CPT | Performed by: PHYSICIAN ASSISTANT

## 2022-08-25 PROCEDURE — 80053 COMPREHEN METABOLIC PANEL: CPT | Performed by: PHYSICIAN ASSISTANT

## 2022-08-25 PROCEDURE — 36415 COLL VENOUS BLD VENIPUNCTURE: CPT | Performed by: PHYSICIAN ASSISTANT

## 2022-08-25 PROCEDURE — 85025 COMPLETE CBC W/AUTO DIFF WBC: CPT | Performed by: PHYSICIAN ASSISTANT

## 2022-08-25 PROCEDURE — 99283 EMERGENCY DEPT VISIT LOW MDM: CPT | Mod: 25

## 2022-08-25 RX ORDER — DOCUSATE SODIUM 100 MG/1
100 CAPSULE, LIQUID FILLED ORAL 2 TIMES DAILY PRN
Qty: 14 CAPSULE | Refills: 0 | Status: SHIPPED | OUTPATIENT
Start: 2022-08-25 | End: 2022-09-01

## 2022-08-25 NOTE — DISCHARGE INSTRUCTIONS
Lots of water. May take colace (stool softeners) for constipation. Try different food options.    is 99%. CONSTITUTIONAL: AOx4, no apparent distress, appears stated age    HEAD: normocephalic, atraumatic   EYES: PERRLA, EOMI    ENT: moist mucous membranes, uvula midline   NECK: supple, symmetric   BACK: symmetric   LUNGS: clear to auscultation bilaterally   CARDIOVASCULAR: regular rate and rhythm, no murmurs, rubs or gallops   ABDOMEN: soft, non-tender, non-distended with normal active bowel sounds   NEUROLOGIC:  MAEx4, no focal sensory or motor deficits   MUSCULOSKELETAL: no clubbing, cyanosis or edema   SKIN: no rash or wounds       DIFFERENTIAL DIAGNOSIS/MDM:   Hemorrhoids, gastritis, colon cancer, gastric ulcer, Diverticulosis      DIAGNOSTIC RESULTS     EKG: All EKG's are interpreted by the Observation Physician who either signs or Co-signs this chart in the absence of a cardiologist.    EKG Interpretation    Heaven Crawford MD      RADIOLOGY:   I directly visualized the following  images and reviewed the radiologist interpretations:    No results found. LABS:  I have reviewed and interpreted all available lab results.   Labs Reviewed   CBC - Abnormal; Notable for the following:        Result Value    Hematocrit 50.7 (*)     All other components within normal limits   COMPREHENSIVE METABOLIC PANEL - Abnormal; Notable for the following:     Glucose 129 (*)     CREATININE 0.62 (*)     All other components within normal limits   PROTIME-INR   APTT   HEMOGLOBIN AND HEMATOCRIT, BLOOD       SCREENING TOOLS:    HEART Risk Score for Chest Pain Patients   History and Physical Exam Suspicion Level  (Nausea, Vomiting, Diaphoresis, Radiation, Exertion)   Slightly Suspicious (0 pts)   Moderately Suspicious (1 pt)   Highly Suspicious (2 pts)   EKG Interpretation   Normal (0 pts)   Non-Specific Repolarization Disturbance (1 pt)   Significant ST-Depression (2 pts)   Age of Patient (in years)   = 45 (0 pts)   46-64 (1 pt)   = 65 (2 pts)   Risk Factors   No Risk Factors (0 pts)   1-2 Risk Factors (1 pt)   = 3 Risk Factors (2 pts)   Risk Factors Include:   Hypercholesterolemia   Hypertension   Diabetes Mellitus   Cigarette smoking   Positive family history   Obesity   CAD   (SLE, CKDz, HIV, Cocaine abuse)   Troponin Levels   = Normal Limit (0 pts)   1-3 Times Normal Limit (1 pt)   > 3 Times Normal Limit (2 pts)  TOTAL:    Percent Risk for Major Adverse Cardiac Event (MACE)  0-3 pts indicates low risk for MACE   2.5% (DISCHARGE)   4-7 pts indicates moderate risk for MACE  20.3% (OBS)  8-10 pts indicates high risk for MACE  72.7% (EARLY INVASIVE TX)    CDU IMPRESSION / Lester Verdugo is a 34 y.o. male who presents with acute rectal bleeding secondary to unknown etiology      · Rectal exam in the ED showed no evidence of hemorrhoids, Hemoccult positive  · Hemoglobin on 10/2 17 and hemoglobin on 10/3 15.5, stable  · GI consultation for possible colonoscopy/EGD  · Continue IV fluids  · Monitor vitals, labs, and imaging  · DISPO: pending consults and clinical improvement    CONSULTS:    IP CONSULT TO GI  IP CONSULT TO GI    PROCEDURES:  Not indicated       PATIENT REFERRED TO:    No follow-up provider specified. --  Keron Zepeda MD   Emergency Medicine Resident     This dictation was generated by voice recognition computer software. Although all attempts are made to edit the dictation for accuracy, there may be errors in the transcription that are not intended.

## 2022-08-25 NOTE — ED PROVIDER NOTES
Encounter Date: 2022       History     Chief Complaint   Patient presents with    Dizziness     Pt accidentally took labetalol instead of her cough medications that starts with L, took her AM amlodipine as well.  reports lightheadedness, also c/o with abdominal pain from umbillical hernia, scheduled for surgery next week.     Abdominal Pain     27 y/o female who presents with  for accidentally taking her normal blood pressure medication (norvasc 10mg) but also took her metoprolol 100mg. She is just a bit nervous about doing this and wanted to get checked out. She also has an umbilical hernia which she is having surgery on 22 for but states has been having a little bit more discomfort and pain with it and hasn't had a BM until today after almost 2 weeks she states. No n/v. No fever. No dysuria.     The history is provided by the patient. No  was used.   Dizziness  Associated symptoms include abdominal pain.   Abdominal Pain  The current episode started several days ago. The onset of the illness was gradual. The problem has not changed since onset.The abdominal pain is located in the periumbilical region. Primary symptoms comment: constipation.   Additional symptoms associated with the illness include constipation.     Review of patient's allergies indicates:   Allergen Reactions    Vancomycin analogues Itching and Swelling     Swollen lips   Swollen lips        Past Medical History:   Diagnosis Date    Anemia     Anxiety     Hypertension      Past Surgical History:   Procedure Laterality Date     SECTION       Family History   Problem Relation Age of Onset    Hypertension Father     Diabetes Father     Hypertension Mother      Social History     Tobacco Use    Smoking status: Never Smoker    Smokeless tobacco: Never Used   Substance Use Topics    Alcohol use: No    Drug use: No     Review of Systems   Constitutional:        Accidentally took wrong bp  medications   Gastrointestinal: Positive for abdominal pain and constipation.   Neurological: Positive for dizziness.   All other systems reviewed and are negative.      Physical Exam     Initial Vitals [08/25/22 0956]   BP Pulse Resp Temp SpO2   (!) 150/93 86 14 99 °F (37.2 °C) 96 %      MAP       --         Physical Exam    Nursing note and vitals reviewed.  Constitutional: She appears well-developed and well-nourished.   Eyes: Conjunctivae are normal.   Cardiovascular: Normal rate, regular rhythm and normal heart sounds.   Pulmonary/Chest: Breath sounds normal. No respiratory distress.   Abdominal: Abdomen is soft. Bowel sounds are normal. A hernia is present. Hernia confirmed positive in the umbilical area (reducible, slight discomfort).   Musculoskeletal:         General: Normal range of motion.     Neurological: She is alert and oriented to person, place, and time. She has normal strength.   Skin: Skin is warm and dry.   Psychiatric: She has a normal mood and affect.         ED Course   Procedures  Labs Reviewed   COMPREHENSIVE METABOLIC PANEL - Abnormal; Notable for the following components:       Result Value    Chloride 108 (*)     Globulin 3.8 (*)     Albumin/Globulin Ratio 0.9 (*)     All other components within normal limits   URINALYSIS, REFLEX TO URINE CULTURE - Abnormal; Notable for the following components:    Protein, UA Trace (*)     Ketones, UA Trace (*)     Leukocyte Esterase, UA 2+ (*)     All other components within normal limits   CBC WITH DIFFERENTIAL - Abnormal; Notable for the following components:    MCHC 32.4 (*)     MPV 11.1 (*)     All other components within normal limits   URINALYSIS, MICROSCOPIC - Abnormal; Notable for the following components:    Squamous Epithelial Cells, UA 10 (*)     All other components within normal limits   PREGNANCY TEST, URINE RAPID - Normal   LACTIC ACID, PLASMA - Normal   CBC W/ AUTO DIFFERENTIAL    Narrative:     The following orders were created for  panel order CBC auto differential.  Procedure                               Abnormality         Status                     ---------                               -----------         ------                     CBC with Differential[[id number]]        Abnormal            Final result                 Please view results for these tests on the individual orders.          Imaging Results    None          Medications - No data to display  Medical Decision Making:   Clinical Tests:   Lab Tests: Ordered and Reviewed  ED Management:  Patient has been here over 4 hours, blood pressure and HR stable. Labs unremarkable for acute findings. Her exam Is benign for emergent findings - she does have an umbilical hernia which is reducible, mild discomfort with this. She has surgery scheduled in 6 days. Will give her some colace to try to help with constipation and discussed food choices. Nontoxic in appearance, okay to discharge.                       Clinical Impression:   Final diagnoses:  [Z51.81] Medication monitoring encounter (Primary)  [K59.00] Constipation, unspecified constipation type          ED Disposition Condition    Discharge Stable        ED Prescriptions     Medication Sig Dispense Start Date End Date Auth. Provider    docusate sodium (COLACE) 100 MG capsule Take 1 capsule (100 mg total) by mouth 2 (two) times daily as needed for Constipation. 14 capsule 8/25/2022 9/1/2022 DENISE Lara        Follow-up Information     Follow up With Specialties Details Why Contact Info    Meghan Fried NP Family Medicine Call in 1 week As needed, If symptoms worsen [address] [facility] [id number] [phone]             DENISE Lara  08/25/22 5135

## 2022-08-31 ENCOUNTER — HOSPITAL ENCOUNTER (OUTPATIENT)
Facility: HOSPITAL | Age: 27
Discharge: HOME | End: 2022-08-31
Attending: SURGERY | Admitting: SURGERY
Payer: MEDICAID

## 2022-08-31 DIAGNOSIS — K42.9 UMBILICAL HERNIA WITHOUT OBSTRUCTION AND WITHOUT GANGRENE: ICD-10-CM

## 2022-08-31 LAB
B-HCG UR QL: NEGATIVE
CTP QC/QA: YES

## 2022-08-31 PROCEDURE — 00840 ANES IPER PX LOWER ABD NOS: CPT | Performed by: SURGERY

## 2022-08-31 PROCEDURE — 71000015 HC POSTOP RECOV 1ST HR: Performed by: SURGERY

## 2022-08-31 PROCEDURE — 36000708 HC OR TIME LEV III 1ST 15 MIN: Performed by: SURGERY

## 2022-08-31 PROCEDURE — 37000008 HC ANESTHESIA 1ST 15 MINUTES: Performed by: SURGERY

## 2022-08-31 PROCEDURE — 81025 URINE PREGNANCY TEST: CPT | Performed by: NURSE PRACTITIONER

## 2022-08-31 PROCEDURE — 25000003 PHARM REV CODE 250: Performed by: SURGERY

## 2022-08-31 PROCEDURE — 63600175 PHARM REV CODE 636 W HCPCS: Performed by: ANESTHESIOLOGY

## 2022-08-31 PROCEDURE — 71000033 HC RECOVERY, INTIAL HOUR: Performed by: SURGERY

## 2022-08-31 PROCEDURE — 25000003 PHARM REV CODE 250: Performed by: NURSE ANESTHETIST, CERTIFIED REGISTERED

## 2022-08-31 PROCEDURE — C1781 MESH (IMPLANTABLE): HCPCS | Performed by: SURGERY

## 2022-08-31 PROCEDURE — 37000009 HC ANESTHESIA EA ADD 15 MINS: Performed by: SURGERY

## 2022-08-31 PROCEDURE — 71000016 HC POSTOP RECOV ADDL HR: Performed by: SURGERY

## 2022-08-31 PROCEDURE — 27201423 OPTIME MED/SURG SUP & DEVICES STERILE SUPPLY: Performed by: SURGERY

## 2022-08-31 PROCEDURE — 63600175 PHARM REV CODE 636 W HCPCS: Performed by: NURSE ANESTHETIST, CERTIFIED REGISTERED

## 2022-08-31 PROCEDURE — 36000709 HC OR TIME LEV III EA ADD 15 MIN: Performed by: SURGERY

## 2022-08-31 PROCEDURE — 25000003 PHARM REV CODE 250: Performed by: ANESTHESIOLOGY

## 2022-08-31 PROCEDURE — 63600175 PHARM REV CODE 636 W HCPCS: Performed by: STUDENT IN AN ORGANIZED HEALTH CARE EDUCATION/TRAINING PROGRAM

## 2022-08-31 DEVICE — MESH VENTRALIGHT ST 4 X 6: Type: IMPLANTABLE DEVICE | Site: UMBILICAL | Status: FUNCTIONAL

## 2022-08-31 RX ORDER — LIDOCAINE HYDROCHLORIDE 10 MG/ML
1 INJECTION, SOLUTION EPIDURAL; INFILTRATION; INTRACAUDAL; PERINEURAL ONCE
Status: ACTIVE | OUTPATIENT
Start: 2022-08-31

## 2022-08-31 RX ORDER — ROCURONIUM BROMIDE 10 MG/ML
INJECTION, SOLUTION INTRAVENOUS
Status: DISCONTINUED | OUTPATIENT
Start: 2022-08-31 | End: 2022-08-31

## 2022-08-31 RX ORDER — LIDOCAINE HYDROCHLORIDE 20 MG/ML
INJECTION, SOLUTION EPIDURAL; INFILTRATION; INTRACAUDAL; PERINEURAL
Status: DISCONTINUED | OUTPATIENT
Start: 2022-08-31 | End: 2022-08-31

## 2022-08-31 RX ORDER — GLYCOPYRROLATE 0.2 MG/ML
INJECTION INTRAMUSCULAR; INTRAVENOUS
Status: DISCONTINUED | OUTPATIENT
Start: 2022-08-31 | End: 2022-08-31

## 2022-08-31 RX ORDER — CEFAZOLIN SODIUM 2 G/50ML
2 SOLUTION INTRAVENOUS
Status: DISCONTINUED | OUTPATIENT
Start: 2022-08-31 | End: 2022-09-01 | Stop reason: HOSPADM

## 2022-08-31 RX ORDER — CEFAZOLIN SODIUM 1 G/3ML
INJECTION, POWDER, FOR SOLUTION INTRAMUSCULAR; INTRAVENOUS
Status: DISCONTINUED | OUTPATIENT
Start: 2022-08-31 | End: 2022-08-31

## 2022-08-31 RX ORDER — MIDAZOLAM HYDROCHLORIDE 1 MG/ML
INJECTION INTRAMUSCULAR; INTRAVENOUS
Status: DISCONTINUED
Start: 2022-08-31 | End: 2022-08-31 | Stop reason: HOSPADM

## 2022-08-31 RX ORDER — MORPHINE SULFATE 2 MG/ML
2 INJECTION, SOLUTION INTRAMUSCULAR; INTRAVENOUS EVERY 5 MIN PRN
Status: DISCONTINUED | OUTPATIENT
Start: 2022-08-31 | End: 2022-09-01 | Stop reason: HOSPADM

## 2022-08-31 RX ORDER — MIDAZOLAM HYDROCHLORIDE 1 MG/ML
2 INJECTION INTRAMUSCULAR; INTRAVENOUS ONCE AS NEEDED
Status: COMPLETED | OUTPATIENT
Start: 2022-08-31 | End: 2022-08-31

## 2022-08-31 RX ORDER — KETOROLAC TROMETHAMINE 30 MG/ML
INJECTION, SOLUTION INTRAMUSCULAR; INTRAVENOUS
Status: DISCONTINUED | OUTPATIENT
Start: 2022-08-31 | End: 2022-08-31

## 2022-08-31 RX ORDER — NEOSTIGMINE METHYLSULFATE 1 MG/ML
INJECTION, SOLUTION INTRAVENOUS
Status: DISCONTINUED | OUTPATIENT
Start: 2022-08-31 | End: 2022-08-31

## 2022-08-31 RX ORDER — BUPIVACAINE HYDROCHLORIDE 2.5 MG/ML
INJECTION, SOLUTION EPIDURAL; INFILTRATION; INTRACAUDAL
Status: DISCONTINUED | OUTPATIENT
Start: 2022-08-31 | End: 2022-08-31 | Stop reason: HOSPADM

## 2022-08-31 RX ORDER — HYDROMORPHONE HYDROCHLORIDE 1 MG/ML
INJECTION, SOLUTION INTRAMUSCULAR; INTRAVENOUS; SUBCUTANEOUS
Status: DISCONTINUED | OUTPATIENT
Start: 2022-08-31 | End: 2022-08-31

## 2022-08-31 RX ORDER — SODIUM CHLORIDE 9 MG/ML
INJECTION, SOLUTION INTRAVENOUS CONTINUOUS
Status: DISCONTINUED | OUTPATIENT
Start: 2022-08-31 | End: 2022-09-01 | Stop reason: HOSPADM

## 2022-08-31 RX ORDER — ONDANSETRON 2 MG/ML
4 INJECTION INTRAMUSCULAR; INTRAVENOUS DAILY PRN
Status: DISCONTINUED | OUTPATIENT
Start: 2022-08-31 | End: 2022-09-01 | Stop reason: HOSPADM

## 2022-08-31 RX ORDER — DEXMEDETOMIDINE HYDROCHLORIDE 100 UG/ML
INJECTION, SOLUTION INTRAVENOUS
Status: DISCONTINUED | OUTPATIENT
Start: 2022-08-31 | End: 2022-08-31

## 2022-08-31 RX ORDER — HEPARIN SODIUM 5000 [USP'U]/ML
5000 INJECTION, SOLUTION INTRAVENOUS; SUBCUTANEOUS
Status: COMPLETED | OUTPATIENT
Start: 2022-08-31 | End: 2022-08-31

## 2022-08-31 RX ORDER — PROPOFOL 10 MG/ML
VIAL (ML) INTRAVENOUS
Status: DISCONTINUED | OUTPATIENT
Start: 2022-08-31 | End: 2022-08-31

## 2022-08-31 RX ORDER — MIDAZOLAM HYDROCHLORIDE 1 MG/ML
INJECTION INTRAMUSCULAR; INTRAVENOUS
Status: DISCONTINUED
Start: 2022-08-31 | End: 2022-08-31 | Stop reason: WASHOUT

## 2022-08-31 RX ORDER — DOCUSATE SODIUM 100 MG/1
100 CAPSULE, LIQUID FILLED ORAL 2 TIMES DAILY PRN
Qty: 14 CAPSULE | Refills: 0 | Status: SHIPPED | OUTPATIENT
Start: 2022-08-31 | End: 2022-11-16

## 2022-08-31 RX ORDER — MEPERIDINE HYDROCHLORIDE 25 MG/ML
12.5 INJECTION INTRAMUSCULAR; INTRAVENOUS; SUBCUTANEOUS EVERY 10 MIN PRN
Status: DISCONTINUED | OUTPATIENT
Start: 2022-08-31 | End: 2022-08-31 | Stop reason: HOSPADM

## 2022-08-31 RX ORDER — ONDANSETRON 2 MG/ML
INJECTION INTRAMUSCULAR; INTRAVENOUS
Status: DISCONTINUED | OUTPATIENT
Start: 2022-08-31 | End: 2022-08-31

## 2022-08-31 RX ORDER — SODIUM CHLORIDE, SODIUM LACTATE, POTASSIUM CHLORIDE, CALCIUM CHLORIDE 600; 310; 30; 20 MG/100ML; MG/100ML; MG/100ML; MG/100ML
INJECTION, SOLUTION INTRAVENOUS CONTINUOUS
Status: DISCONTINUED | OUTPATIENT
Start: 2022-08-31 | End: 2022-09-01 | Stop reason: HOSPADM

## 2022-08-31 RX ORDER — OXYCODONE HYDROCHLORIDE 5 MG/1
5 TABLET ORAL
Status: DISCONTINUED | OUTPATIENT
Start: 2022-08-31 | End: 2022-09-01 | Stop reason: HOSPADM

## 2022-08-31 RX ORDER — BUPIVACAINE HYDROCHLORIDE 2.5 MG/ML
INJECTION, SOLUTION EPIDURAL; INFILTRATION; INTRACAUDAL
Status: DISCONTINUED
Start: 2022-08-31 | End: 2022-08-31 | Stop reason: HOSPADM

## 2022-08-31 RX ORDER — DEXAMETHASONE SODIUM PHOSPHATE 4 MG/ML
INJECTION, SOLUTION INTRA-ARTICULAR; INTRALESIONAL; INTRAMUSCULAR; INTRAVENOUS; SOFT TISSUE
Status: DISCONTINUED | OUTPATIENT
Start: 2022-08-31 | End: 2022-08-31

## 2022-08-31 RX ORDER — HYDROCODONE BITARTRATE AND ACETAMINOPHEN 10; 325 MG/1; MG/1
1 TABLET ORAL EVERY 6 HOURS PRN
Qty: 10 TABLET | Refills: 0 | Status: SHIPPED | OUTPATIENT
Start: 2022-08-31 | End: 2022-11-16

## 2022-08-31 RX ORDER — FENTANYL CITRATE 50 UG/ML
INJECTION, SOLUTION INTRAMUSCULAR; INTRAVENOUS
Status: DISCONTINUED | OUTPATIENT
Start: 2022-08-31 | End: 2022-08-31

## 2022-08-31 RX ADMIN — ROCURONIUM BROMIDE 40 MG: 10 SOLUTION INTRAVENOUS at 07:08

## 2022-08-31 RX ADMIN — HEPARIN SODIUM 5000 UNITS: 5000 INJECTION, SOLUTION INTRAVENOUS; SUBCUTANEOUS at 05:08

## 2022-08-31 RX ADMIN — DEXMEDETOMIDINE 10 MCG: 200 INJECTION, SOLUTION INTRAVENOUS at 10:08

## 2022-08-31 RX ADMIN — HYDROMORPHONE HYDROCHLORIDE 0.2 MG: 1 INJECTION, SOLUTION INTRAMUSCULAR; INTRAVENOUS; SUBCUTANEOUS at 08:08

## 2022-08-31 RX ADMIN — ROCURONIUM BROMIDE 20 MG: 10 SOLUTION INTRAVENOUS at 09:08

## 2022-08-31 RX ADMIN — GLYCOPYRROLATE 0.8 MG: 0.2 INJECTION INTRAMUSCULAR; INTRAVENOUS at 09:08

## 2022-08-31 RX ADMIN — SODIUM CHLORIDE, POTASSIUM CHLORIDE, SODIUM LACTATE AND CALCIUM CHLORIDE: 600; 310; 30; 20 INJECTION, SOLUTION INTRAVENOUS at 06:08

## 2022-08-31 RX ADMIN — DEXMEDETOMIDINE 10 MCG: 200 INJECTION, SOLUTION INTRAVENOUS at 09:08

## 2022-08-31 RX ADMIN — NEOSTIGMINE METHYLSULFATE 5 MG: 1 INJECTION INTRAVENOUS at 09:08

## 2022-08-31 RX ADMIN — HYDROMORPHONE HYDROCHLORIDE 0.1 MG: 1 INJECTION, SOLUTION INTRAMUSCULAR; INTRAVENOUS; SUBCUTANEOUS at 10:08

## 2022-08-31 RX ADMIN — OXYCODONE HYDROCHLORIDE 5 MG: 5 TABLET ORAL at 11:08

## 2022-08-31 RX ADMIN — MIDAZOLAM HYDROCHLORIDE 2 MG: 1 INJECTION, SOLUTION INTRAMUSCULAR; INTRAVENOUS at 06:08

## 2022-08-31 RX ADMIN — HYDROMORPHONE HYDROCHLORIDE 0.1 MG: 1 INJECTION, SOLUTION INTRAMUSCULAR; INTRAVENOUS; SUBCUTANEOUS at 09:08

## 2022-08-31 RX ADMIN — DEXMEDETOMIDINE 10 MCG: 200 INJECTION, SOLUTION INTRAVENOUS at 07:08

## 2022-08-31 RX ADMIN — HYDROMORPHONE HYDROCHLORIDE 0.2 MG: 1 INJECTION, SOLUTION INTRAMUSCULAR; INTRAVENOUS; SUBCUTANEOUS at 09:08

## 2022-08-31 RX ADMIN — PROPOFOL 150 MG: 10 INJECTION, EMULSION INTRAVENOUS at 07:08

## 2022-08-31 RX ADMIN — DEXMEDETOMIDINE 10 MCG: 200 INJECTION, SOLUTION INTRAVENOUS at 08:08

## 2022-08-31 RX ADMIN — ROCURONIUM BROMIDE 10 MG: 10 SOLUTION INTRAVENOUS at 07:08

## 2022-08-31 RX ADMIN — ONDANSETRON 4 MG: 2 INJECTION INTRAMUSCULAR; INTRAVENOUS at 09:08

## 2022-08-31 RX ADMIN — CEFAZOLIN 2 G: 330 INJECTION, POWDER, FOR SOLUTION INTRAMUSCULAR; INTRAVENOUS at 07:08

## 2022-08-31 RX ADMIN — DEXAMETHASONE SODIUM PHOSPHATE 8 MG: 4 INJECTION, SOLUTION INTRA-ARTICULAR; INTRALESIONAL; INTRAMUSCULAR; INTRAVENOUS; SOFT TISSUE at 07:08

## 2022-08-31 RX ADMIN — LIDOCAINE HYDROCHLORIDE 40 MG: 20 INJECTION, SOLUTION EPIDURAL; INFILTRATION; INTRACAUDAL; PERINEURAL at 07:08

## 2022-08-31 RX ADMIN — KETOROLAC TROMETHAMINE 30 MG: 30 INJECTION, SOLUTION INTRAMUSCULAR; INTRAVENOUS at 07:08

## 2022-08-31 RX ADMIN — HYDROMORPHONE HYDROCHLORIDE 0.2 MG: 1 INJECTION, SOLUTION INTRAMUSCULAR; INTRAVENOUS; SUBCUTANEOUS at 10:08

## 2022-08-31 RX ADMIN — FENTANYL CITRATE 100 MCG: 50 INJECTION, SOLUTION INTRAMUSCULAR; INTRAVENOUS at 07:08

## 2022-08-31 NOTE — DISCHARGE SUMMARY
[facility]  General Surgery  Discharge Summary      Patient Name: Meghna Dozier  MRN: [id number]  Admission Date: 8/31/2022  Hospital Length of Stay: 0 days  Discharge Date and Time:  08/31/2022 3:26 PM  Attending Physician: Keshia Gu MD   Discharging Provider: Tisha Caraballo MD  Primary Care Provider: Meghan Fried NP    HPI:   No notes on file    Procedure(s) (LRB):  REPAIR, HERNIA, UMBILICAL, LAPAROSCOPIC (N/A)      Indwelling Lines/Drains at time of discharge:   Lines/Drains/Airways     None               Hospital Course: Patient is a 27 yo female with history of HTN who presented for elective laparoscopic umbilical hernia repair. She tolerated the procedure without complication, please see operative note for details. She was discharged when her pain was controlled and she was able to tolerate PO intake.       Goals of Care Treatment Preferences:  Code Status: Full Code      Pending Diagnostic Studies:     None        There are no hospital problems to display for this patient.     Discharged Condition: good    Disposition: Home or Self Care    Follow Up:    Patient Instructions:      Diet Adult Regular     Lifting restrictions   Order Comments: No lifting over >10 lbs for 6 weeks.     Notify your health care provider if you experience any of the following:  temperature >100.4     Notify your health care provider if you experience any of the following:  persistent nausea and vomiting or diarrhea     Notify your health care provider if you experience any of the following:  severe uncontrolled pain     Notify your health care provider if you experience any of the following:  redness, tenderness, or signs of infection (pain, swelling, redness, odor or green/yellow discharge around incision site)     No dressing needed     Remove dressing in 48 hours   Order Comments: Can remove top dressing in 48 hours. Leave steri-strips on until they come off on their own. Can shower  on Friday. Do not soak incisions.     No driving until:   Order Comments: No driving for 3 days. No driving while taking Norco     Activity as tolerated     Medications:  Reconciled Home Medications:      Medication List      START taking these medications    HYDROcodone-acetaminophen  mg per tablet  Commonly known as: NORCO  Take 1 tablet by mouth every 6 (six) hours as needed for Pain.        CHANGE how you take these medications    * docusate sodium 100 MG capsule  Commonly known as: COLACE  Take 1 capsule (100 mg total) by mouth 2 (two) times daily as needed for Constipation.  What changed: Another medication with the same name was added. Make sure you understand how and when to take each.     * docusate sodium 100 MG capsule  Commonly known as: COLACE  Take 1 capsule (100 mg total) by mouth 2 (two) times daily as needed for Constipation.  What changed: You were already taking a medication with the same name, and this prescription was added. Make sure you understand how and when to take each.         * This list has 2 medication(s) that are the same as other medications prescribed for you. Read the directions carefully, and ask your doctor or other care provider to review them with you.            CONTINUE taking these medications    amLODIPine 10 MG tablet  Commonly known as: NORVASC  Take 1 tablet (10 mg total) by mouth once daily.     cyclobenzaprine 10 MG tablet  Commonly known as: FLEXERIL  Take 10 mg by mouth every 8 (eight) hours as needed.     fluticasone propionate 50 mcg/actuation nasal spray  Commonly known as: FLONASE  1 spray (50 mcg total) by Each Nostril route once daily.     loratadine 10 mg tablet  Commonly known as: CLARITIN  Take 1 tablet (10 mg total) by mouth once daily.          Time spent on the discharge of patient: 20 minutes    Tisha Caraballo MD  General Surgery  [facility]

## 2022-08-31 NOTE — ANESTHESIA POSTPROCEDURE EVALUATION
Anesthesia Post Evaluation    Patient: Meghna D Jacoby    Procedure(s) Performed: Procedure(s) (LRB):  REPAIR, HERNIA, UMBILICAL, LAPAROSCOPIC (N/A)    Final Anesthesia Type: general      Patient location during evaluation: DOSC  Post-procedure vital signs: reviewed and stable  Airway patency: patent      Anesthetic complications: no      Cardiovascular status: hemodynamically stable  Respiratory status: spontaneous ventilation  Follow-up not needed.          Vitals Value Taken Time   /58 08/31/22 1045   Temp 36.6 °C (97.9 °F) 08/31/22 1021   Pulse 62 08/31/22 1045   Resp 20 08/31/22 1045   SpO2 97 % 08/31/22 1045         Event Time   Out of Recovery 11:00:00         Pain/Cory Score: No data recorded

## 2022-08-31 NOTE — TRANSFER OF CARE
Anesthesia Transfer of Care Note    Patient: Meghna Dozier    Procedure(s) Performed: Procedure(s) (LRB):  REPAIR, HERNIA, UMBILICAL, LAPAROSCOPIC (N/A)    Patient location: PACU    Anesthesia Type: general    Transport from OR: Transported from OR on room air with adequate spontaneous ventilation    Post pain: adequate analgesia    Post assessment: no apparent anesthetic complications    Post vital signs: stable    Level of consciousness: awake and sedated    Nausea/Vomiting: no nausea/vomiting    Complications: none    Transfer of care protocol was followed      Last vitals:   Visit Vitals  /72 (BP Location: Right arm, Patient Position: Lying)   Pulse 82   Temp 36.3 °C (97.3 °F) (Temporal)   Resp 20   Wt 74.8 kg (164 lb 14.5 oz)   SpO2 100%   Breastfeeding No   BMI 26.82 kg/m²

## 2022-08-31 NOTE — ANESTHESIA PROCEDURE NOTES
Intubation    Date/Time: 8/31/2022 7:14 AM  Performed by: Kashmir Lund CRNA  Authorized by: Pita Bell MD     Intubation:     Induction:  Intravenous    Intubated:  Postinduction    Mask Ventilation:  Easy mask    Attempts:  1    Attempted By:  CRNA    Method of Intubation:  Direct    Blade:  Coello 2    Laryngeal View Grade: Grade I - full view of cords      Difficult Airway Encountered?: No      Complications:  None    Airway Device:  Oral endotracheal tube    Airway Device Size:  7.5    Style/Cuff Inflation:  Cuffed (inflated to minimal occlusive pressure)    Inflation Amount (mL):  6    Tube secured:  21    Secured at:  The lips    Placement Verified By:  Capnometry    Complicating Factors:  None    Findings Post-Intubation:  BS equal bilateral and atraumatic/condition of teeth unchanged

## 2022-08-31 NOTE — DISCHARGE INSTRUCTIONS
UMBILICAL HERNIA    ***** PLEASE KEEP THESE POST OP INSTRUCTIONS WITH YOU UNTIL YOUR    FOLLOW-UP APPOINTMENT *****    · FOLLOW UP: Appointment in [facility] __on Sept. 15, 2022 @ 11:30 AM_.    · PAIN: Apply ice pack to abdomen as needed for pain. Alternate Tylenol and Ibuprofen (regular over the counter- follow instructions on the bottle). Take Your prescription pain medication AS PRESCRIBED ONLY for severe pain unrelieved by Tylenol (acetaminophen) or Ibuprofen.    · EXAMPLE: Take Tylenol. Three hours later take Ibuprofen. Three hours after that take Tylenol again, and repeat until no longer needed. If Pain is still bad once you start Tylenol and Ibuprofen, add pain medication. Dont drive or drink alcohol with pain medication. Dont take pain medication more often than it is prescribed to be taken. Limit tylenol intake to 3,000 mg per day    INFECTION: Call your doctor at [phone] or report to the emergency room:    - if redness around your incision begins to spread, or you have swelling.    - If your incision has opened up    - If you have green, yellow, or cottage cheese-like drainage coming from your incision    - If you begin to run fever over 100.4 F    - if you are feeling weaker    - INCISION (WOUND) CARE: Leave adhesive glue on your skin until the glue peels away on its own. You may remove the gauze bandage at your belly button on Friday. Wash gently over incision site - do not scrub or peel skin glue. Do not soak your wounds in water for 2 weeks. Do not take baths, swim, or use a hot tub until your doctor says it is okay.    · NAUSEA: You can eat and drink whatever you like as tolerated. You may experience post anesthesia nausea. Apply cold cloths to your face and neck and call your doctor for a prescription for nausea medication. If you have any uncontrolled vomiting that is not resolving within a few hours, report to your emergency room. Resume all medications tomorrow.    · ACTIVITY: Do not  lift anything that is heavier than 10 lbs. (4.5 kg) for 6 weeks .   Avoid straining or lifting over your head, Return to work when you feel well enough: your pain is controlled without the narcotic pain medication and you feel strong enough. Do not drink alcohol or drive for 3 days, or as long as you are on pain medication.    · Notify MD of any moderate to severe pain unrelieved by pain medicine or for any signs of infection including fever above 100.4, excessive redness or swelling, yellow/green foul- smelling drainage, nausea or vomiting. Clinics number is [phone]. If it is after business hours or emergency call [phone] and state Im having post op complications and need to speak to the surgeon on call.    · Please check in to [facility] so the surgery nurses know how you are doing at home. We appreciate you checking in

## 2022-08-31 NOTE — OP NOTE
[facility] OPERATIVE REPORT    Patient Name: Meghna Dozier  Date of Admission: 8/31/2022  YOB: 1995  Date of procedure: 08/31/2022    Attending Surgeon: Dr. Gu    Resident Surgeon(s): Tisha Caraballo, PGY-3    Pre-operative diagnosis:  1. Umbilical hernia      Post-operative diagnosis:   1. Umbilical hernia    Procedure:  1. Laparoscopic hybrid umbilical hernia repair    Anesthesia: General    Complications: None    Specimens: None    Blood loss: 5 mL    Indication:  Meghna Dozier is a 26 y.o. female with history of HTN who presented to clinic with an umbilical hernia.     Findings: Umbilical hernia, 4 x 5 cm defect    Procedure in detail:  After appropriate consents were obtained, the patient was brought to the operating room and placed in supine position. A general anesthetic was administered. Patient was then prepped and draped in standard sterile fashion with chloraprep solution. A proper timeout was performed confirming the correct patient, procedure, preoperative antibiotic. A veress needle was then inserted in the RUQ and abdomen insufflated without difficulty. A 5mm optical trochar was then placed in RUQ through this incision. A camera was inserted and there was no injury noted to bowel or mesentery. Two additional right sided 5 mm trocars were inserted under direct visualization. The abdomen was inspected and there was noted to be a large umbilical hernia. A laparoscopic harmonic and atraumatic grasper was then used to clear the fat from the anterior abdominal wall in the midline. Attention was then turned to the umbilicus and a curvilinear supraumbilical incision was created. Bovie electrocautery was used to carry the incision down to the fascial defect. The hernia sac was encountered and removed from the surrounding tissue. The subcutaneous tissue was cleared circumferentially around the defect and a ventralite 15.2 circular mesh was inserted into the abdomen. Mesh was  previously prepared with goretex suture and vicryl suture at the 4 cardinal points. The defect was closed with interrupted 0-Ethibond sutures. Abdomen was reinsufflated to 12 mmHg and the sutures were pulled through the abdominal wall with laparoscopic trans-fascial suture passer and secured. An additional 5 mm trocar was placed in the left lateral abdomen under direct visualization. The mesh was noted to lay flat against the anterior abdominal wall and was secured circumferentially using laparoscopic tacker. Instruments were removed and the abdomen was desufflated. The umbilicus was secured to the fascia using interrupted 3-0 vicryl suture. Local anesthetic was injected at all incision sites. Dermis of the umbilical incision was closed with interrupted 3-0 vicryl suture. Skin of all incisions were closed with 4-0 monocryl suture. Dermabond was placed. Patient tolerated procedure well without complication. The patient was extubated in the OR and taken to the PACU in stable condition. All instruments, needles, and sponge counts were correct x2. Dr. Gu was present and scrubbed for the entirety of the procedure.        Tisha Caraballo MD  [facility] General Surgery, PGY-3

## 2022-08-31 NOTE — H&P
Chief complaint: Umbilical Hernia      HPI:   Meghna Dozier is a 26 y.o. female with PMHx significant for  (21) and hypertension complains of a protruding mass in the umbilical region. The mass has always been present but has grown in size since her pregnancy. She reports some pain when eating. Pt denies nausea, vomiting, or constipation.     PMHx:  has a past medical history of Anemia, Anxiety, and Hypertension.  PSHx:  has a past surgical history that includes  section.  FamHx: family history includes Diabetes in her father; Hypertension in her father and mother.  SocHx:  reports that she has never smoked. She has never used smokeless tobacco. She reports that she does not drink alcohol and does not use drugs.     Physical Exam:      Vitals:     22 1211   BP: (!) 142/82   Pulse: 86   Resp: 20   Temp: 98.8 °F (37.1 °C)      General: awake, alert, cooperative, in no acute distress. Mood/affect normal.   HEENT: NCAT  Lungs: No increased WOB  Abd: soft, non-tender, non-distended, reducible umbilical hernia with 4 cm defect, no overlying skin changes.  Extremities: MAEW     Assessment and Plan:  26F with history of HTN who presents with an umbilical hernia.     - Plan for laparoscopic umbilical hernia repair with hybrid approach on 22  - Consented at clinic, risks and benefits discussed. Consent updated today.    Ritchie Fraga MD  [facility] General Surgery, PGY-1

## 2022-08-31 NOTE — DISCHARGE SUMMARY
[facility]  Discharge Summary    Admit Date: 8/31/2022  Discharge Date: 8/31/2022  Admitting Physician: Keshia Gu MD  Discharging Physicians(s): Keshia Gu MD    Hospital Course:   Meghna Dozier is a 25 yo F with a Hx of HTN who presented to  clinic with complaints of a protruding mass in her umbilical region that was diagnosed as an umbilical hernia. Pt elected to undergo surgical repair and she presented  today to undergo a laparoscopic umbilical hernia repair with a hybrid approach. She tolerated the procedure well and there were no complications. Her condition remained stable throughout the remainder of the immediate post-operative period. She met all discharge criteria and was discharged home later that day.    Procedures Performed:  Laparoscopic umbilical hernia repair with a hybrid approach    Final Diagnoses:  Same    Discharge Condition:  Stable    Disposition:  Home    Follow-Up Plan:  RTC in 2 weeks    Discharge Instructions:   Activity: As tolerated  Diet: Regular  Wound Care: Remove dressing in 48 hours. Can shower. Do not soak incisions.    Discharge Medications:  Current Discharge Medication List        START taking these medications    Details   !! docusate sodium (COLACE) 100 MG capsule Take 1 capsule (100 mg total) by mouth 2 (two) times daily as needed for Constipation.  Qty: 14 capsule, Refills: 0      HYDROcodone-acetaminophen (NORCO)  mg per tablet Take 1 tablet by mouth every 6 (six) hours as needed for Pain.  Qty: 10 tablet, Refills: 0    Comments: Quantity prescribed more than 7 day supply? No       !! - Potential duplicate medications found. Please discuss with provider.        CONTINUE these medications which have NOT CHANGED    Details   amLODIPine (NORVASC) 10 MG tablet Take 1 tablet (10 mg total) by mouth once daily.  Qty: 90 tablet, Refills: 1    Comments: .  Associated Diagnoses: Essential hypertension      fluticasone propionate (FLONASE) 50 mcg/actuation  nasal spray 1 spray (50 mcg total) by Each Nostril route once daily.  Qty: 18.2 mL, Refills: 3      loratadine (CLARITIN) 10 mg tablet Take 1 tablet (10 mg total) by mouth once daily.  Qty: 90 tablet, Refills: 0      cyclobenzaprine (FLEXERIL) 10 MG tablet Take 10 mg by mouth every 8 (eight) hours as needed.      !! docusate sodium (COLACE) 100 MG capsule Take 1 capsule (100 mg total) by mouth 2 (two) times daily as needed for Constipation.  Qty: 14 capsule, Refills: 0       !! - Potential duplicate medications found. Please discuss with provider.          Ritchie Fraga MD   \[facility]\"" General Surgery PGY1  08/31/2022 2:24 PM

## 2022-08-31 NOTE — HOSPITAL COURSE
Patient is a 27 yo female with history of HTN who presented for elective laparoscopic umbilical hernia repair. She tolerated the procedure without complication, please see operative note for details. She was discharged when her pain was controlled and she was able to tolerate PO intake.

## 2022-08-31 NOTE — BRIEF OP NOTE
[facility]  Brief Operative Note    Surgery Date: 8/31/2022     Surgeon(s) and Role:     * Keshia Gu MD - Primary     * Tisha Caraballo MD - Resident - Assisting    Pre-op Diagnosis:  * Umbilical hernia    Post-op Diagnosis:  Post-Op Diagnosis Codes:     * Umbilical hernia without obstruction and without gangrene [K42.9]    Procedure(s) (LRB):  REPAIR, HERNIA, UMBILICAL, LAPAROSCOPIC (N/A)    Anesthesia: General    Operative Findings: Umbilical hernia, 4 x 5 cm defect    Estimated Blood Loss: * No values recorded between 8/31/2022  7:27 AM and 8/31/2022 10:21 AM *         Specimens: None    Tisha Caraballo MD  [facility] General Surgery, PGY-3

## 2022-09-02 ENCOUNTER — TELEPHONE (OUTPATIENT)
Dept: SURGERY | Facility: CLINIC | Age: 27
End: 2022-09-02
Payer: MEDICAID

## 2022-09-02 VITALS
SYSTOLIC BLOOD PRESSURE: 110 MMHG | TEMPERATURE: 98 F | RESPIRATION RATE: 20 BRPM | WEIGHT: 164.88 LBS | OXYGEN SATURATION: 97 % | BODY MASS INDEX: 26.82 KG/M2 | DIASTOLIC BLOOD PRESSURE: 70 MMHG | HEART RATE: 2 BPM

## 2022-09-02 RX ORDER — HYDROCODONE BITARTRATE AND ACETAMINOPHEN 10; 325 MG/1; MG/1
1 TABLET ORAL EVERY 6 HOURS PRN
Qty: 20 TABLET | Refills: 0 | Status: SHIPPED | OUTPATIENT
Start: 2022-09-02 | End: 2022-11-16

## 2022-09-02 NOTE — TELEPHONE ENCOUNTER
Consulted with Dr. Barros and Dr. Lake with the above information.  Prescription was written for Norco.  I called patient to educate on taking narcotics, staying hydrated, weaning off and then switching to alternating Tylenol and Ibuprofen.  Also, told her that Rx must be picked up by 4pm today.  She was very receptive and verbalized understanding.    ----- Message from Jose Rosado LPN sent at 9/2/2022 12:18 PM CDT -----  Regarding: FW: Rx Refill    ----- Message -----  From: Rachel Guzman  Sent: 9/2/2022  11:31 AM CDT  To: Jose Rosado LPN  Subject: Rx Refill                                        Pt's  called for refill, states the doctor told them that he would authorize another Rx if needed  hydrocodone 325mg. Please address, thanks!  Pharmacy - [facility]

## 2022-09-15 ENCOUNTER — OFFICE VISIT (OUTPATIENT)
Dept: SURGERY | Facility: CLINIC | Age: 27
End: 2022-09-15
Payer: MEDICAID

## 2022-09-15 VITALS
DIASTOLIC BLOOD PRESSURE: 87 MMHG | SYSTOLIC BLOOD PRESSURE: 125 MMHG | BODY MASS INDEX: 26.74 KG/M2 | OXYGEN SATURATION: 98 % | HEART RATE: 85 BPM | WEIGHT: 166.38 LBS | RESPIRATION RATE: 20 BRPM | HEIGHT: 66 IN | TEMPERATURE: 99 F

## 2022-09-15 DIAGNOSIS — K42.9 UMBILICAL HERNIA WITHOUT OBSTRUCTION AND WITHOUT GANGRENE: Primary | ICD-10-CM

## 2022-09-15 PROCEDURE — 99214 OFFICE O/P EST MOD 30 MIN: CPT | Mod: PBBFAC

## 2022-09-15 NOTE — LETTER
September 15, 2022    Meghna Dozier  [address]  [facility] [address]         [facility]  [address] [facility] [address]  Phone: [phone] September 15, 2022     Patient: Meghna Dozier   YOB: 1995   Date of Visit: 9/15/2022       To Whom It May Concern:    It is my medical opinion that Meghna Dozier may return to full duty immediately with no restrictions.    If you have any questions or concerns, please don't hesitate to call.    Sincerely,        Alyce Conte MD

## 2022-09-15 NOTE — PROGRESS NOTES
Chief complaint:  had concerns including S/P Laparoscopic UHR (Complaints of right side abdominal pain since last week).     HPI:   Meghna Dozier is a 26 y.o. female with PMHx significant for  in  and HTN presents for a 2 weeks post-op for umbilical hernia repair on 2022. She is doing well and does not have any pain in the area. She states that she does have some pain in the right side of abdomen. She has been having regular bowel movements after an enema 3 days post-op and tolerating a regular diet. She denies any nausea, vomiting, fever, chills.    ROS:  As stated above      PMHx:  has a past medical history of Anemia, Anxiety, and Hypertension.  PSHx:  has a past surgical history that includes  section and Laparoscopic repair of umbilical hernia (N/A, 2022).  FamHx: family history includes Diabetes in her father; Hypertension in her father and mother.  SocHx:  reports that she has never smoked. She has never used smokeless tobacco. She reports that she does not drink alcohol and does not use drugs.    Physical Exam:  Vitals: There were no vitals taken for this visit.  General: awake, alert, cooperative, in no acute distress. Pt oriented x3.  Mood/affect normal.  Chest: RRR  Lungs: stable on room air  Abd: soft, non-tender, non-distended; hernia repair site look well-healed without any evidence of erythema or swelling  Extremities:  MAEW, good muscle tone and strength  Skin: no rash or edema noted  Neuro: AAO x 3, follows commands      Assessment and Plan:  Meghna Dozier is a 26 y.o. female with PMHx significant for  in  and HTN presents for a 2 weeks post-op for umbilical hernia repair on 2022.    1) Recommended no heavy lifting (over 20 pounds) until after 6 weeks post-op    2) Work release/excuse for compensation for her job    Return to clinic as needed    PGY 5 Addendum    Patient seen and examined with medical student. Agree as above. Doing well post  operatively, eating having bowel movements. C/o R sided abdominal pain but is well controlled. Requesting work release. Return to clinic PRN.     Alyce Conte  11:48 AM  9/15/2022

## 2022-09-15 NOTE — PROGRESS NOTES
Patient seen by Dr. Conte. Work excuse printed and given to patient. RTC PRN. Discharge instructions given verbal.

## 2022-09-16 ENCOUNTER — HOSPITAL ENCOUNTER (EMERGENCY)
Facility: HOSPITAL | Age: 27
Discharge: HOME | End: 2022-09-16
Attending: STUDENT IN AN ORGANIZED HEALTH CARE EDUCATION/TRAINING PROGRAM
Payer: MEDICAID

## 2022-09-16 VITALS
WEIGHT: 166 LBS | DIASTOLIC BLOOD PRESSURE: 75 MMHG | OXYGEN SATURATION: 99 % | RESPIRATION RATE: 18 BRPM | TEMPERATURE: 99 F | SYSTOLIC BLOOD PRESSURE: 112 MMHG | BODY MASS INDEX: 28.34 KG/M2 | HEART RATE: 91 BPM | HEIGHT: 64 IN

## 2022-09-16 DIAGNOSIS — R11.2 NAUSEA VOMITING AND DIARRHEA: Primary | ICD-10-CM

## 2022-09-16 DIAGNOSIS — R19.7 NAUSEA VOMITING AND DIARRHEA: Primary | ICD-10-CM

## 2022-09-16 DIAGNOSIS — R10.9 RIGHT FLANK PAIN: ICD-10-CM

## 2022-09-16 LAB
ALBUMIN SERPL-MCNC: 3.8 GM/DL (ref 3.5–5)
ALBUMIN/GLOB SERPL: 1 RATIO (ref 1.1–2)
ALP SERPL-CCNC: 83 UNIT/L (ref 40–150)
ALT SERPL-CCNC: 9 UNIT/L (ref 0–55)
APPEARANCE UR: CLEAR
AST SERPL-CCNC: 11 UNIT/L (ref 5–34)
B-HCG SERPL QL: NEGATIVE
BACTERIA #/AREA URNS AUTO: NORMAL /HPF
BASOPHILS # BLD AUTO: 0.02 X10(3)/MCL (ref 0–0.2)
BASOPHILS NFR BLD AUTO: 0.2 %
BILIRUB UR QL STRIP.AUTO: NEGATIVE MG/DL
BILIRUBIN DIRECT+TOT PNL SERPL-MCNC: 1 MG/DL
BUN SERPL-MCNC: 13.8 MG/DL (ref 7–18.7)
CALCIUM SERPL-MCNC: 9.8 MG/DL (ref 8.4–10.2)
CHLORIDE SERPL-SCNC: 103 MMOL/L (ref 98–107)
CO2 SERPL-SCNC: 27 MMOL/L (ref 22–29)
COLOR UR AUTO: YELLOW
CREAT SERPL-MCNC: 0.83 MG/DL (ref 0.55–1.02)
EOSINOPHIL # BLD AUTO: 0.14 X10(3)/MCL (ref 0–0.9)
EOSINOPHIL NFR BLD AUTO: 1.2 %
ERYTHROCYTE [DISTWIDTH] IN BLOOD BY AUTOMATED COUNT: 13.2 % (ref 11.5–17)
GFR SERPLBLD CREATININE-BSD FMLA CKD-EPI: >60 MLS/MIN/1.73/M2
GLOBULIN SER-MCNC: 4 GM/DL (ref 2.4–3.5)
GLUCOSE SERPL-MCNC: 94 MG/DL (ref 74–100)
GLUCOSE UR QL STRIP.AUTO: NEGATIVE MG/DL
HCT VFR BLD AUTO: 43.7 % (ref 37–47)
HGB BLD-MCNC: 14.4 GM/DL (ref 12–16)
IMM GRANULOCYTES # BLD AUTO: 0.04 X10(3)/MCL (ref 0–0.04)
IMM GRANULOCYTES NFR BLD AUTO: 0.3 %
KETONES UR QL STRIP.AUTO: ABNORMAL MG/DL
LEUKOCYTE ESTERASE UR QL STRIP.AUTO: ABNORMAL UNIT/L
LIPASE SERPL-CCNC: 57 U/L
LYMPHOCYTES # BLD AUTO: 0.58 X10(3)/MCL (ref 0.6–4.6)
LYMPHOCYTES NFR BLD AUTO: 4.9 %
MCH RBC QN AUTO: 29.6 PG (ref 27–31)
MCHC RBC AUTO-ENTMCNC: 33 MG/DL (ref 33–36)
MCV RBC AUTO: 89.7 FL (ref 80–94)
MONOCYTES # BLD AUTO: 0.59 X10(3)/MCL (ref 0.1–1.3)
MONOCYTES NFR BLD AUTO: 5 %
NEUTROPHILS # BLD AUTO: 10.4 X10(3)/MCL (ref 2.1–9.2)
NEUTROPHILS NFR BLD AUTO: 88.4 %
NITRITE UR QL STRIP.AUTO: NEGATIVE
NRBC BLD AUTO-RTO: 0 %
PH UR STRIP.AUTO: 6 [PH]
PLATELET # BLD AUTO: 385 X10(3)/MCL (ref 130–400)
PMV BLD AUTO: 10.6 FL (ref 7.4–10.4)
POTASSIUM SERPL-SCNC: 4.8 MMOL/L (ref 3.5–5.1)
PROT SERPL-MCNC: 7.8 GM/DL (ref 6.4–8.3)
PROT UR QL STRIP.AUTO: ABNORMAL MG/DL
RBC # BLD AUTO: 4.87 X10(6)/MCL (ref 4.2–5.4)
RBC #/AREA URNS AUTO: <5 /HPF
RBC UR QL AUTO: NEGATIVE UNIT/L
SODIUM SERPL-SCNC: 137 MMOL/L (ref 136–145)
SP GR UR STRIP.AUTO: 1.02 (ref 1–1.03)
SQUAMOUS #/AREA URNS AUTO: <5 /HPF
UROBILINOGEN UR STRIP-ACNC: 0.2 MG/DL
WBC # SPEC AUTO: 11.7 X10(3)/MCL (ref 4.5–11.5)
WBC #/AREA URNS AUTO: <5 /HPF

## 2022-09-16 PROCEDURE — 36415 COLL VENOUS BLD VENIPUNCTURE: CPT | Performed by: PHYSICIAN ASSISTANT

## 2022-09-16 PROCEDURE — 63600175 PHARM REV CODE 636 W HCPCS: Performed by: PHYSICIAN ASSISTANT

## 2022-09-16 PROCEDURE — 81025 URINE PREGNANCY TEST: CPT | Performed by: PHYSICIAN ASSISTANT

## 2022-09-16 PROCEDURE — 96374 THER/PROPH/DIAG INJ IV PUSH: CPT

## 2022-09-16 PROCEDURE — 83690 ASSAY OF LIPASE: CPT | Performed by: PHYSICIAN ASSISTANT

## 2022-09-16 PROCEDURE — 25000003 PHARM REV CODE 250: Performed by: PHYSICIAN ASSISTANT

## 2022-09-16 PROCEDURE — 99284 EMERGENCY DEPT VISIT MOD MDM: CPT | Mod: 25

## 2022-09-16 PROCEDURE — 85025 COMPLETE CBC W/AUTO DIFF WBC: CPT | Performed by: PHYSICIAN ASSISTANT

## 2022-09-16 PROCEDURE — 96361 HYDRATE IV INFUSION ADD-ON: CPT

## 2022-09-16 PROCEDURE — 80053 COMPREHEN METABOLIC PANEL: CPT | Performed by: PHYSICIAN ASSISTANT

## 2022-09-16 PROCEDURE — 81001 URINALYSIS AUTO W/SCOPE: CPT | Performed by: PHYSICIAN ASSISTANT

## 2022-09-16 PROCEDURE — 63600175 PHARM REV CODE 636 W HCPCS: Performed by: NURSE PRACTITIONER

## 2022-09-16 PROCEDURE — 96375 TX/PRO/DX INJ NEW DRUG ADDON: CPT

## 2022-09-16 RX ORDER — ONDANSETRON 2 MG/ML
4 INJECTION INTRAMUSCULAR; INTRAVENOUS
Status: COMPLETED | OUTPATIENT
Start: 2022-09-16 | End: 2022-09-16

## 2022-09-16 RX ORDER — ONDANSETRON 4 MG/1
4 TABLET, ORALLY DISINTEGRATING ORAL EVERY 6 HOURS PRN
Qty: 30 TABLET | Refills: 0 | Status: SHIPPED | OUTPATIENT
Start: 2022-09-16 | End: 2022-11-16

## 2022-09-16 RX ORDER — KETOROLAC TROMETHAMINE 10 MG/1
10 TABLET, FILM COATED ORAL EVERY 6 HOURS
Qty: 20 TABLET | Refills: 0 | Status: SHIPPED | OUTPATIENT
Start: 2022-09-16 | End: 2022-09-21

## 2022-09-16 RX ORDER — KETOROLAC TROMETHAMINE 30 MG/ML
30 INJECTION, SOLUTION INTRAMUSCULAR; INTRAVENOUS
Status: COMPLETED | OUTPATIENT
Start: 2022-09-16 | End: 2022-09-16

## 2022-09-16 RX ADMIN — KETOROLAC TROMETHAMINE 30 MG: 30 INJECTION, SOLUTION INTRAMUSCULAR at 04:09

## 2022-09-16 RX ADMIN — ONDANSETRON 4 MG: 2 INJECTION INTRAMUSCULAR; INTRAVENOUS at 02:09

## 2022-09-16 RX ADMIN — SODIUM CHLORIDE 1000 ML: 9 INJECTION, SOLUTION INTRAVENOUS at 02:09

## 2022-09-16 NOTE — ED PROVIDER NOTES
Encounter Date: 2022       History     Chief Complaint   Patient presents with    Vomiting     Patient reports N/V/D for 4 days, post op visit yesterday, also reports lower abd pain, hernia repair surgery on      25 y/o female who presents with diarrhea almost every time she eats for the past couple days along with right flank area pain. This AM started with nausea and vomiting in addition. She recently had umbilical hernia surgery (saw surgeons yesterday for post op visit and all was well). No fever. She states she made them aware regarding the right side abdominal discomfort and they felt it would likely resolve on its own. She does admit her son has a little stomach bug right now and she may have caught it from him.     The history is provided by the patient. No  was used.   Vomiting   The current episode started several days ago. The problem occurs every few hours. The problem has been unchanged. Associated symptoms include abdominal pain and diarrhea. Risk factors include ill contacts.   Review of patient's allergies indicates:   Allergen Reactions    Vancomycin analogues Itching and Swelling     Swollen lips   Swollen lips        Past Medical History:   Diagnosis Date    Anemia     Anxiety     Hypertension      Past Surgical History:   Procedure Laterality Date     SECTION      LAPAROSCOPIC REPAIR OF UMBILICAL HERNIA N/A 2022    Procedure: REPAIR, HERNIA, UMBILICAL, LAPAROSCOPIC;  Surgeon: Keshia Gu MD;  Location: [facility];  Service: General;  Laterality: N/A;     Family History   Problem Relation Age of Onset    Hypertension Father     Diabetes Father     Hypertension Mother      Social History     Tobacco Use    Smoking status: Never    Smokeless tobacco: Never   Substance Use Topics    Alcohol use: No    Drug use: No     Review of Systems   Gastrointestinal:  Positive for abdominal pain, diarrhea, nausea and vomiting.   All other systems reviewed and are  negative.    Physical Exam     Initial Vitals [09/16/22 1109]   BP Pulse Resp Temp SpO2   121/73 106 18 99.1 °F (37.3 °C) 100 %      MAP       --         Physical Exam    Nursing note and vitals reviewed.  Constitutional: She appears well-developed and well-nourished.   Cardiovascular:  Normal rate, regular rhythm and normal heart sounds.           Pulmonary/Chest: Breath sounds normal. No respiratory distress.   Abdominal: Abdomen is soft. Bowel sounds are normal. There is abdominal tenderness (right flank (mild); periumbilical right over surgical site minimal, surgical sites intact not red not draining).   Musculoskeletal:         General: Normal range of motion.     Neurological: She is alert and oriented to person, place, and time.   Skin: Skin is warm and dry.   Psychiatric: She has a normal mood and affect.       ED Course   Procedures  Labs Reviewed   COMPREHENSIVE METABOLIC PANEL - Abnormal; Notable for the following components:       Result Value    Globulin 4.0 (*)     Albumin/Globulin Ratio 1.0 (*)     All other components within normal limits   URINALYSIS, REFLEX TO URINE CULTURE - Abnormal; Notable for the following components:    Protein, UA 1+ (*)     Ketones, UA Trace (*)     Leukocyte Esterase, UA Trace (*)     All other components within normal limits   CBC WITH DIFFERENTIAL - Abnormal; Notable for the following components:    WBC 11.7 (*)     MPV 10.6 (*)     Lymph # 0.58 (*)     Neut # 10.4 (*)     All other components within normal limits   PREGNANCY TEST, URINE RAPID - Normal   LIPASE - Normal   URINALYSIS, MICROSCOPIC - Normal   CBC W/ AUTO DIFFERENTIAL    Narrative:     The following orders were created for panel order CBC auto differential.  Procedure                               Abnormality         Status                     ---------                               -----------         ------                     CBC with Differential[[id number]]        Abnormal            Final result                  Please view results for these tests on the individual orders.          Imaging Results    None          Medications   sodium chloride 0.9% bolus 1,000 mL (0 mLs Intravenous Stopped 9/16/22 1556)   ondansetron injection 4 mg (4 mg Intravenous Given 9/16/22 1449)   ketorolac injection 30 mg (30 mg Intravenous Given 9/16/22 1600)     Medical Decision Making:   Clinical Tests:   Lab Tests: Ordered and Reviewed  Additional MDM:   Differential Diagnosis:   Other: The following diagnoses were also considered and will be evaluated: gastroenteritis, uti and dehydration.          ED Course as of 09/16/22 1622   Fri Sep 16, 2022   1622 Patient feeling better after fluids, zofran and toradol. Discussed plan. She is comfortable and all questions answered.  [EV]      ED Course User Index  [EV] DENISE Lara                 Clinical Impression:   Final diagnoses:  [R11.2, R19.7] Nausea vomiting and diarrhea (Primary)  [R10.9] Right flank pain      ED Disposition Condition    Discharge Stable          ED Prescriptions       Medication Sig Dispense Start Date End Date Auth. Provider    ondansetron (ZOFRAN-ODT) 4 MG TbDL Take 1 tablet (4 mg total) by mouth every 6 (six) hours as needed (nausea/vomiting). 30 tablet 9/16/2022 -- DENISE Lara    ketorolac (TORADOL) 10 mg tablet Take 1 tablet (10 mg total) by mouth every 6 (six) hours. for 5 days 20 tablet 9/16/2022 9/21/2022 DENISE Lara          Follow-up Information       Follow up With Specialties Details Why Contact Info    Meghan Fried NP Family Medicine Call in 1 week As needed, If symptoms worsen [facility] [facility] [address] [phone]               DENISE Lara  09/16/22 1622

## 2022-09-16 NOTE — FIRST PROVIDER EVALUATION
"Medical screening examination initiated.  I have conducted a focused provider triage encounter, findings are as follows:    Chief Complaint   Patient presents with    Vomiting     Patient reports N/V/D for 4 days, post op visit yesterday, also reports lower abd pain, hernia repair surgery on 8/31     Brief history of present illness:  26 y.o. female presents to the ED with n/v/d onset 4 days ago with associated RLQ pain and weakness. Notes having umbilical hernia repair on 8/31 at [facility]. States she had post-op visit yesterday and was told the symptoms should resolve soon. Denies fever, chills.     Vitals:    09/16/22 1109   BP: 121/73   Pulse: 106   Resp: 18   Temp: 99.1 °F (37.3 °C)   TempSrc: Oral   SpO2: 100%   Weight: 75.3 kg (166 lb)   Height: 5' 4" (1.626 m)       Pertinent physical exam:  Awake, alert, ambulatory, non-labored respirations    Brief workup plan:  labs, UA    Preliminary workup initiated; this workup will be continued and followed by the physician or advanced practice provider that is assigned to the patient when roomed.  "

## 2022-09-16 NOTE — DISCHARGE INSTRUCTIONS
Zofran odt (under tongue) as needed for nausea/vomiting. Go slow with diet. Let diarrhea run its course unless it becomes every 30 minutes and feeling weak. Return to ER for worsening symptoms

## 2022-11-16 ENCOUNTER — OFFICE VISIT (OUTPATIENT)
Dept: FAMILY MEDICINE | Facility: CLINIC | Age: 27
End: 2022-11-16
Payer: MEDICAID

## 2022-11-16 ENCOUNTER — HOSPITAL ENCOUNTER (OUTPATIENT)
Dept: RADIOLOGY | Facility: HOSPITAL | Age: 27
Discharge: HOME | End: 2022-11-16
Payer: MEDICAID

## 2022-11-16 VITALS
BODY MASS INDEX: 27.31 KG/M2 | TEMPERATURE: 99 F | HEART RATE: 77 BPM | WEIGHT: 160 LBS | HEIGHT: 64 IN | RESPIRATION RATE: 20 BRPM | OXYGEN SATURATION: 99 % | SYSTOLIC BLOOD PRESSURE: 124 MMHG | DIASTOLIC BLOOD PRESSURE: 81 MMHG

## 2022-11-16 DIAGNOSIS — R05.3 CHRONIC COUGH: Primary | ICD-10-CM

## 2022-11-16 DIAGNOSIS — K42.9 UMBILICAL HERNIA WITHOUT OBSTRUCTION AND WITHOUT GANGRENE: ICD-10-CM

## 2022-11-16 DIAGNOSIS — E28.2 PCOS (POLYCYSTIC OVARIAN SYNDROME): ICD-10-CM

## 2022-11-16 DIAGNOSIS — I10 ESSENTIAL HYPERTENSION: Chronic | ICD-10-CM

## 2022-11-16 PROCEDURE — 3074F PR MOST RECENT SYSTOLIC BLOOD PRESSURE < 130 MM HG: ICD-10-PCS | Mod: CPTII,,,

## 2022-11-16 PROCEDURE — 99214 OFFICE O/P EST MOD 30 MIN: CPT | Mod: S$PBB,,,

## 2022-11-16 PROCEDURE — 3079F PR MOST RECENT DIASTOLIC BLOOD PRESSURE 80-89 MM HG: ICD-10-PCS | Mod: CPTII,,,

## 2022-11-16 PROCEDURE — 1159F MED LIST DOCD IN RCRD: CPT | Mod: CPTII,,,

## 2022-11-16 PROCEDURE — 1160F PR REVIEW ALL MEDS BY PRESCRIBER/CLIN PHARMACIST DOCUMENTED: ICD-10-PCS | Mod: CPTII,,,

## 2022-11-16 PROCEDURE — 99213 OFFICE O/P EST LOW 20 MIN: CPT | Mod: PBBFAC,25,PN

## 2022-11-16 PROCEDURE — 3008F PR BODY MASS INDEX (BMI) DOCUMENTED: ICD-10-PCS | Mod: CPTII,,,

## 2022-11-16 PROCEDURE — 71046 X-RAY EXAM CHEST 2 VIEWS: CPT | Mod: TC,PN

## 2022-11-16 PROCEDURE — 99214 PR OFFICE/OUTPT VISIT, EST, LEVL IV, 30-39 MIN: ICD-10-PCS | Mod: S$PBB,,,

## 2022-11-16 PROCEDURE — 3074F SYST BP LT 130 MM HG: CPT | Mod: CPTII,,,

## 2022-11-16 PROCEDURE — 1159F PR MEDICATION LIST DOCUMENTED IN MEDICAL RECORD: ICD-10-PCS | Mod: CPTII,,,

## 2022-11-16 PROCEDURE — 3079F DIAST BP 80-89 MM HG: CPT | Mod: CPTII,,,

## 2022-11-16 PROCEDURE — 3008F BODY MASS INDEX DOCD: CPT | Mod: CPTII,,,

## 2022-11-16 PROCEDURE — 1160F RVW MEDS BY RX/DR IN RCRD: CPT | Mod: CPTII,,,

## 2022-11-16 RX ORDER — ALBUTEROL SULFATE 90 UG/1
2 AEROSOL, METERED RESPIRATORY (INHALATION) EVERY 6 HOURS PRN
Qty: 18 G | Refills: 1 | Status: SHIPPED | OUTPATIENT
Start: 2022-11-16

## 2022-11-16 RX ORDER — NORGESTIMATE AND ETHINYL ESTRADIOL 0.25-0.035
1 KIT ORAL DAILY
Qty: 30 TABLET | Refills: 11 | Status: SHIPPED | OUTPATIENT
Start: 2022-11-16 | End: 2023-11-16

## 2022-11-16 RX ORDER — FLUTICASONE PROPIONATE 50 MCG
1 SPRAY, SUSPENSION (ML) NASAL DAILY
Qty: 18.2 ML | Refills: 3 | Status: SHIPPED | OUTPATIENT
Start: 2022-11-16

## 2022-11-16 RX ORDER — AMLODIPINE BESYLATE 10 MG/1
10 TABLET ORAL DAILY
Qty: 90 TABLET | Refills: 1 | Status: SHIPPED | OUTPATIENT
Start: 2022-11-16 | End: 2023-06-05

## 2022-11-16 RX ORDER — LORATADINE 10 MG/1
10 TABLET ORAL DAILY
Qty: 90 TABLET | Refills: 0 | Status: SHIPPED | OUTPATIENT
Start: 2022-11-16 | End: 2023-10-02

## 2022-11-16 NOTE — ASSESSMENT & PLAN NOTE
Chronic issue, worsening.  Patient is is not currently on birth control.  She is no longer breast-feeding her son, she would like to try birth control.  Patient had a trial metformin for management of hirtuism which she had difficulty maintaining compliance with.

## 2022-11-16 NOTE — PROGRESS NOTES
Patient Name: Meghna Dozier   : 1995  MRN: [id number]     Subjective:   Patient ID: Meghna Dozier is a 27 y.o. female.    Chief Complaint:   Chief Complaint   Patient presents with    Hirsutism     Made appointment with c/o facial hair, and cough for years continues        HPI: 2022:  patient has failed to follow up for routine follow up, today she made clinic appointment for facial hair and chronic cough. Patient states cough is non productive, she denies any recent infections. Patient states cough is worse at night or when she gets overheated. She has never been diagnosed with asthma, endorses allergies.  She is been taking Flonase and Claritin for about 3 months now and this has not improved her cough any.  Patient willing to discuss her options for management of PCOS, she states that it seems to be getting worse again in his noticing more facial hair on her face.  Patient previously had tried metformin with her gynecologist but was inconsistent due to side effects of medication.    2022: her routine wellness labs were never collected, will recollect those today. Switch to 10mg amlodipine has been working well for her. She is monitoring at home with readings in the 120s over 80s, states she has never seen numbers like that. She has yet to hear from surgery clinic about her hernia.      2022:  Presenting to establish care.  She has not had PCP has been primarily managed by her gyn. She was previously on amlodipine 10 mg for this prior to her pregnancy. she is open to switching back to that medication, she is not breastfeeding or pregnant. She also has developed an umbilical hernia since pregnancy. It is getting progressively more painful and protruding more often.  It still retractable.  She will continue see gyn for annual Pap smears last 1 performed after childbirth in 2021.      ROS:  Review of Systems   HENT:  Negative for hearing loss.    Eyes:  Negative for discharge.  "  Respiratory:  Positive for cough. Negative for wheezing.    Cardiovascular:  Negative for chest pain and palpitations.   Gastrointestinal:  Negative for blood in stool, constipation, diarrhea and vomiting.   Genitourinary:  Negative for dysuria and hematuria.   Musculoskeletal:  Negative for neck pain.   Neurological:  Negative for weakness and headaches.   Endo/Heme/Allergies:  Negative for polydipsia.      Answers submitted by the patient for this visit:  Review of Systems Questionnaire (Submitted on 2022)  activity change: No  unexpected weight change: No  rhinorrhea: No  trouble swallowing: No  visual disturbance: No  chest tightness: No  polyuria: No  difficulty urinating: No  menstrual problem: No  joint swelling: No  arthralgias: No  confusion: No  dysphoric mood: No  History:     Past Medical History:   Diagnosis Date    Anemia     Anxiety     Hypertension       Past Surgical History:   Procedure Laterality Date     SECTION      LAPAROSCOPIC REPAIR OF UMBILICAL HERNIA N/A 2022    Procedure: REPAIR, HERNIA, UMBILICAL, LAPAROSCOPIC;  Surgeon: Keshia uG MD;  Location: [facility];  Service: General;  Laterality: N/A;     Family History   Problem Relation Age of Onset    Hypertension Father     Diabetes Father     Hypertension Mother       Social History     Tobacco Use    Smoking status: Never    Smokeless tobacco: Never   Substance and Sexual Activity    Alcohol use: No    Drug use: No    Sexual activity: Yes     Partners: Male        Allergies:   Review of patient's allergies indicates:   Allergen Reactions    Vancomycin analogues Itching and Swelling     Swollen lips   Swollen lips        Objective:     Vitals:    22 0702   BP: 124/81   Pulse: 77   Resp: 20   Temp: 98.6 °F (37 °C)   TempSrc: Oral   SpO2: 99%   Weight: 72.6 kg (160 lb)   Height: 5' 4" (1.626 m)   PainSc: 0-No pain     Body mass index is 27.46 kg/m².     Physical Examination:   Physical Exam  Constitutional:       " General: She is not in acute distress.     Appearance: Normal appearance. She is not ill-appearing.   Cardiovascular:      Rate and Rhythm: Normal rate and regular rhythm.      Heart sounds: Normal heart sounds.   Pulmonary:      Effort: Pulmonary effort is normal. No respiratory distress.      Breath sounds: Normal breath sounds.   Musculoskeletal:      Cervical back: Normal range of motion.   Skin:     General: Skin is warm and dry.   Neurological:      Mental Status: She is alert and oriented to person, place, and time.   Psychiatric:         Mood and Affect: Mood normal.         Behavior: Behavior normal.       Assessment:     Problem List Items Addressed This Visit          Cardiac/Vascular    Essential hypertension (Chronic)    Overview     Low Sodium Diet (Dash Diet - less than 2 grams of sodium per day).  Monitor Blood Pressure daily and log. Report any consistent numbers greater than 140/90.  Smoking Cessation encouraged to aid in BP reduction.  Maintain healthy weight with goal BMI <30. Exercise 30 minutes per day 5 days per week           Relevant Medications    amLODIPine (NORVASC) 10 MG tablet       Endocrine    PCOS (polycystic ovarian syndrome) (Chronic)    Current Assessment & Plan     Chronic issue, worsening.  Patient is is not currently on birth control.  She is no longer breast-feeding her son, she would like to try birth control.  Patient had a trial metformin for management of hirtuism which she had difficulty maintaining compliance with.         Relevant Medications    norgestimate-ethinyl estradioL (ORTHO-CYCLEN) 0.25-35 mg-mcg per tablet       GI    Umbilical hernia    Overview     Hernia since 9/21 post delivery   Retractable, causing more pain lately          Current Assessment & Plan     And surgery, this issue is resolved.          Other Visit Diagnoses       Chronic cough    -  Primary    Relevant Medications    albuterol (PROVENTIL HFA) 90 mcg/actuation inhaler    fluticasone  propionate (FLONASE) 50 mcg/actuation nasal spray    loratadine (CLARITIN) 10 mg tablet    Other Relevant Orders    X-Ray Chest PA And Lateral            Plan:   Meghna was seen today for hirsutism.    Diagnoses and all orders for this visit:    Chronic cough  -     albuterol (PROVENTIL HFA) 90 mcg/actuation inhaler; Inhale 2 puffs into the lungs every 6 (six) hours as needed for Wheezing. Rescue  -     X-Ray Chest PA And Lateral  -     fluticasone propionate (FLONASE) 50 mcg/actuation nasal spray; 1 spray (50 mcg total) by Each Nostril route once daily.  -     loratadine (CLARITIN) 10 mg tablet; Take 1 tablet (10 mg total) by mouth once daily.    Essential hypertension  -     amLODIPine (NORVASC) 10 MG tablet; Take 1 tablet (10 mg total) by mouth once daily.    PCOS (polycystic ovarian syndrome)  -     norgestimate-ethinyl estradioL (ORTHO-CYCLEN) 0.25-35 mg-mcg per tablet; Take 1 tablet by mouth once daily.    Umbilical hernia without obstruction and without gangrene     Follow up in about 4 weeks (around 12/14/2022), or if symptoms worsen or fail to improve, for med change fu.     This note was created with the assistance of Dragon voice recognition software or phone dictation. There may be transcription errors as a result of using this technology however minimal. Effort has been made to assure accuracy of transcription but any obvious errors or omissions should be clarified with the author of the document

## 2023-01-25 ENCOUNTER — PATIENT MESSAGE (OUTPATIENT)
Dept: FAMILY MEDICINE | Facility: CLINIC | Age: 28
End: 2023-01-25
Payer: MEDICAID

## 2023-06-05 ENCOUNTER — OFFICE VISIT (OUTPATIENT)
Dept: FAMILY MEDICINE | Facility: CLINIC | Age: 28
End: 2023-06-05
Payer: MEDICAID

## 2023-06-05 VITALS
OXYGEN SATURATION: 100 % | DIASTOLIC BLOOD PRESSURE: 81 MMHG | WEIGHT: 158.19 LBS | SYSTOLIC BLOOD PRESSURE: 125 MMHG | BODY MASS INDEX: 27.01 KG/M2 | HEIGHT: 64 IN | HEART RATE: 66 BPM | TEMPERATURE: 98 F | RESPIRATION RATE: 16 BRPM

## 2023-06-05 DIAGNOSIS — D50.0 IRON DEFICIENCY ANEMIA DUE TO CHRONIC BLOOD LOSS: ICD-10-CM

## 2023-06-05 DIAGNOSIS — R73.03 PREDIABETES: Primary | ICD-10-CM

## 2023-06-05 DIAGNOSIS — I10 ESSENTIAL HYPERTENSION: Primary | ICD-10-CM

## 2023-06-05 DIAGNOSIS — R73.03 PREDIABETES: ICD-10-CM

## 2023-06-05 DIAGNOSIS — Z00.00 ENCOUNTER FOR WELLNESS EXAMINATION: ICD-10-CM

## 2023-06-05 DIAGNOSIS — E28.2 PCOS (POLYCYSTIC OVARIAN SYNDROME): ICD-10-CM

## 2023-06-05 DIAGNOSIS — I10 ESSENTIAL HYPERTENSION: ICD-10-CM

## 2023-06-05 LAB
ALBUMIN SERPL-MCNC: 3.7 G/DL (ref 3.5–5)
ALBUMIN/GLOB SERPL: 0.8 RATIO (ref 1.1–2)
ALP SERPL-CCNC: 59 UNIT/L (ref 40–150)
ALT SERPL-CCNC: 12 UNIT/L (ref 0–55)
AST SERPL-CCNC: 15 UNIT/L (ref 5–34)
BASOPHILS # BLD AUTO: 0.08 X10(3)/MCL
BASOPHILS NFR BLD AUTO: 1.4 %
BILIRUBIN DIRECT+TOT PNL SERPL-MCNC: 0.5 MG/DL
BUN SERPL-MCNC: 7.6 MG/DL (ref 7–18.7)
CALCIUM SERPL-MCNC: 9.5 MG/DL (ref 8.4–10.2)
CHLORIDE SERPL-SCNC: 106 MMOL/L (ref 98–107)
CHOLEST SERPL-MCNC: 170 MG/DL
CHOLEST/HDLC SERPL: 4 {RATIO} (ref 0–5)
CO2 SERPL-SCNC: 26 MMOL/L (ref 22–29)
CREAT SERPL-MCNC: 0.81 MG/DL (ref 0.55–1.02)
DEPRECATED CALCIDIOL+CALCIFEROL SERPL-MC: 9.6 NG/ML (ref 30–80)
EOSINOPHIL # BLD AUTO: 0.17 X10(3)/MCL (ref 0–0.9)
EOSINOPHIL NFR BLD AUTO: 2.9 %
ERYTHROCYTE [DISTWIDTH] IN BLOOD BY AUTOMATED COUNT: 13.3 % (ref 11.5–17)
EST. AVERAGE GLUCOSE BLD GHB EST-MCNC: 93.9 MG/DL
GFR SERPLBLD CREATININE-BSD FMLA CKD-EPI: >60 MLS/MIN/1.73/M2
GLOBULIN SER-MCNC: 4.4 GM/DL (ref 2.4–3.5)
GLUCOSE SERPL-MCNC: 80 MG/DL (ref 74–100)
HBA1C MFR BLD: 4.9 %
HCT VFR BLD AUTO: 41.5 % (ref 37–47)
HDLC SERPL-MCNC: 46 MG/DL (ref 35–60)
HGB BLD-MCNC: 13.8 G/DL (ref 12–16)
IMM GRANULOCYTES # BLD AUTO: 0.01 X10(3)/MCL (ref 0–0.04)
IMM GRANULOCYTES NFR BLD AUTO: 0.2 %
LDLC SERPL CALC-MCNC: 112 MG/DL (ref 50–140)
LYMPHOCYTES # BLD AUTO: 2.01 X10(3)/MCL (ref 0.6–4.6)
LYMPHOCYTES NFR BLD AUTO: 34.1 %
MCH RBC QN AUTO: 28.8 PG (ref 27–31)
MCHC RBC AUTO-ENTMCNC: 33.3 G/DL (ref 33–36)
MCV RBC AUTO: 86.6 FL (ref 80–94)
MONOCYTES # BLD AUTO: 0.66 X10(3)/MCL (ref 0.1–1.3)
MONOCYTES NFR BLD AUTO: 11.2 %
NEUTROPHILS # BLD AUTO: 2.96 X10(3)/MCL (ref 2.1–9.2)
NEUTROPHILS NFR BLD AUTO: 50.2 %
NRBC BLD AUTO-RTO: 0 %
PLATELET # BLD AUTO: 323 X10(3)/MCL (ref 130–400)
PMV BLD AUTO: 10.9 FL (ref 7.4–10.4)
POTASSIUM SERPL-SCNC: 4.8 MMOL/L (ref 3.5–5.1)
PROT SERPL-MCNC: 8.1 GM/DL (ref 6.4–8.3)
RBC # BLD AUTO: 4.79 X10(6)/MCL (ref 4.2–5.4)
SODIUM SERPL-SCNC: 138 MMOL/L (ref 136–145)
T4 FREE SERPL-MCNC: 1.18 NG/DL (ref 0.7–1.48)
TRIGL SERPL-MCNC: 61 MG/DL (ref 37–140)
TSH SERPL-ACNC: 0.62 UIU/ML (ref 0.35–4.94)
VLDLC SERPL CALC-MCNC: 12 MG/DL
WBC # SPEC AUTO: 5.89 X10(3)/MCL (ref 4.5–11.5)

## 2023-06-05 PROCEDURE — 3074F SYST BP LT 130 MM HG: CPT | Mod: CPTII,,,

## 2023-06-05 PROCEDURE — 84439 ASSAY OF FREE THYROXINE: CPT

## 2023-06-05 PROCEDURE — 3079F DIAST BP 80-89 MM HG: CPT | Mod: CPTII,,,

## 2023-06-05 PROCEDURE — 84443 ASSAY THYROID STIM HORMONE: CPT

## 2023-06-05 PROCEDURE — 1159F PR MEDICATION LIST DOCUMENTED IN MEDICAL RECORD: ICD-10-PCS | Mod: CPTII,,,

## 2023-06-05 PROCEDURE — 3008F PR BODY MASS INDEX (BMI) DOCUMENTED: ICD-10-PCS | Mod: CPTII,,,

## 2023-06-05 PROCEDURE — 3079F PR MOST RECENT DIASTOLIC BLOOD PRESSURE 80-89 MM HG: ICD-10-PCS | Mod: CPTII,,,

## 2023-06-05 PROCEDURE — 99213 OFFICE O/P EST LOW 20 MIN: CPT | Mod: PBBFAC,PN

## 2023-06-05 PROCEDURE — 82306 VITAMIN D 25 HYDROXY: CPT

## 2023-06-05 PROCEDURE — 83036 HEMOGLOBIN GLYCOSYLATED A1C: CPT

## 2023-06-05 PROCEDURE — 85025 COMPLETE CBC W/AUTO DIFF WBC: CPT

## 2023-06-05 PROCEDURE — 80053 COMPREHEN METABOLIC PANEL: CPT

## 2023-06-05 PROCEDURE — 99214 PR OFFICE/OUTPT VISIT, EST, LEVL IV, 30-39 MIN: ICD-10-PCS | Mod: S$PBB,,,

## 2023-06-05 PROCEDURE — 99214 OFFICE O/P EST MOD 30 MIN: CPT | Mod: S$PBB,,,

## 2023-06-05 PROCEDURE — 3008F BODY MASS INDEX DOCD: CPT | Mod: CPTII,,,

## 2023-06-05 PROCEDURE — 3074F PR MOST RECENT SYSTOLIC BLOOD PRESSURE < 130 MM HG: ICD-10-PCS | Mod: CPTII,,,

## 2023-06-05 PROCEDURE — 1159F MED LIST DOCD IN RCRD: CPT | Mod: CPTII,,,

## 2023-06-05 PROCEDURE — 36415 COLL VENOUS BLD VENIPUNCTURE: CPT

## 2023-06-05 PROCEDURE — 80061 LIPID PANEL: CPT

## 2023-06-05 RX ORDER — SPIRONOLACTONE 25 MG/1
25 TABLET ORAL DAILY
Qty: 90 TABLET | Refills: 0 | Status: SHIPPED | OUTPATIENT
Start: 2023-06-05 | End: 2023-06-29 | Stop reason: SDUPTHER

## 2023-06-05 RX ORDER — SPIRONOLACTONE 25 MG/1
25 TABLET ORAL DAILY
Qty: 60 TABLET | Refills: 0 | Status: SHIPPED | OUTPATIENT
Start: 2023-06-05 | End: 2023-06-05 | Stop reason: SDUPTHER

## 2023-06-05 RX ORDER — SEMAGLUTIDE 0.68 MG/ML
0.25 INJECTION, SOLUTION SUBCUTANEOUS
Qty: 1.5 ML | Refills: 0 | Status: SHIPPED | OUTPATIENT
Start: 2023-06-05 | End: 2023-06-29 | Stop reason: SDUPTHER

## 2023-06-05 NOTE — ASSESSMENT & PLAN NOTE
Stable and well controlled. Continue current medication. Limit salt in diet. Monitor BP and notify clinic for consistently elevated BP >140/90.      Switching patient from amlodipine 10 mg to spironolactone

## 2023-06-05 NOTE — PROGRESS NOTES
Patient Name: Meghna Dozier   : 1995  MRN: [id number]     Subjective:   Patient ID: Meghna Dozier is a 27 y.o. female.    Chief Complaint:   Chief Complaint   Patient presents with    Follow-up     C/o unable to lose wt, PCOS         HPI: 2023:  Patient self scheduled appointment today to discuss PCOS and prediabetes medications.  She was prescribed oral metformin to be taking daily and has had difficult time with compliance due to upset stomach, nausea.  Patient is no longer able to take this medication in his curious if there is another medicine to help manage her prediabetes levels.  Patient denies any personal or family history of thyroid medullary cancer or thyroid disease.  Patient states that since stopping her oral metformin she has noticed increase in her facial hair again, endorses inability to lose weight despite participating in aerobic boot camp, gym membership.    2022:  patient has failed to follow up for routine follow up, today she made clinic appointment for facial hair and chronic cough. Patient states cough is non productive, she denies any recent infections. Patient states cough is worse at night or when she gets overheated. She has never been diagnosed with asthma, endorses allergies.  She is been taking Flonase and Claritin for about 3 months now and this has not improved her cough any.  Patient willing to discuss her options for management of PCOS, she states that it seems to be getting worse again in his noticing more facial hair on her face.  Patient previously had tried metformin with her gynecologist but was inconsistent due to side effects of medication.    2022: her routine wellness labs were never collected, will recollect those today. Switch to 10mg amlodipine has been working well for her. She is monitoring at home with readings in the 120s over 80s, states she has never seen numbers like that. She has yet to hear from surgery clinic about her  hernia.      2022:  [facility].  She has not had PCP has been primarily managed by her gyn. She was previously on amlodipine 10 mg for this prior to her pregnancy. she is open to switching back to that medication, she is not breastfeeding or pregnant. She also has developed an umbilical hernia since pregnancy. It is getting progressively more painful and protruding more often.  It still retractable.  She will continue see gyn for annual Pap smears last 1 performed after childbirth in 2021.      ROS:  Review of Systems   Constitutional:  Negative for chills, fever and weight loss.        Inability to lose weight   HENT:  Negative for ear discharge, nosebleeds and tinnitus.         Facial hair   Eyes:  Negative for blurred vision, photophobia and pain.   Respiratory:  Negative for cough, shortness of breath, wheezing and stridor.    Cardiovascular:  Negative for chest pain, palpitations and orthopnea.   Gastrointestinal:  Negative for abdominal pain, heartburn and nausea.   Genitourinary:  Negative for dysuria, frequency, hematuria and urgency.   Musculoskeletal:  Negative for falls and myalgias.   Skin:  Negative for itching and rash.   Neurological:  Negative for dizziness, sensory change, speech change, focal weakness, seizures, weakness and headaches.   Endo/Heme/Allergies:  Negative for environmental allergies. Does not bruise/bleed easily.   Psychiatric/Behavioral:  Negative for hallucinations and suicidal ideas.     History:     Past Medical History:   Diagnosis Date    Anemia     Anxiety     Hypertension       Past Surgical History:   Procedure Laterality Date     SECTION      LAPAROSCOPIC REPAIR OF UMBILICAL HERNIA N/A 2022    Procedure: REPAIR, HERNIA, UMBILICAL, LAPAROSCOPIC;  Surgeon: Keshia Gu MD;  Location: [facility];  Service: General;  Laterality: N/A;     Family History   Problem Relation Age of Onset    Hypertension Father     Diabetes Father      "Hypertension Mother       Social History     Tobacco Use    Smoking status: Never    Smokeless tobacco: Never   Substance and Sexual Activity    Alcohol use: No    Drug use: No    Sexual activity: Yes     Partners: Male        Allergies:   Review of patient's allergies indicates:   Allergen Reactions    Vancomycin analogues Itching and Swelling     Swollen lips   Swollen lips        Objective:     Vitals:    06/05/23 0912   BP: 125/81   Pulse: 66   Resp: 16   Temp: 97.8 °F (36.6 °C)   SpO2: 100%   Weight: 71.8 kg (158 lb 3.2 oz)   Height: 5' 4" (1.626 m)     Body mass index is 27.15 kg/m².     Physical Examination:   Physical Exam  Constitutional:       General: She is not in acute distress.     Appearance: Normal appearance. She is not ill-appearing.   Cardiovascular:      Rate and Rhythm: Normal rate and regular rhythm.      Heart sounds: Normal heart sounds.   Pulmonary:      Effort: Pulmonary effort is normal. No respiratory distress.      Breath sounds: Normal breath sounds.   Musculoskeletal:      Cervical back: Normal range of motion.   Skin:     General: Skin is warm and dry.   Neurological:      Mental Status: She is alert and oriented to person, place, and time.   Psychiatric:         Mood and Affect: Mood normal.         Behavior: Behavior normal.       Assessment and Plan     Problem List Items Addressed This Visit          Cardiac/Vascular    Essential hypertension - Primary (Chronic)    Overview     Low Sodium Diet (Dash Diet - less than 2 grams of sodium per day).  Monitor Blood Pressure daily and log. Report any consistent numbers greater than 140/90.  Smoking Cessation encouraged to aid in BP reduction.  Maintain healthy weight with goal BMI <30. Exercise 30 minutes per day 5 days per week           Current Assessment & Plan     Stable and well controlled. Continue current medication. Limit salt in diet. Monitor BP and notify clinic for consistently elevated BP >140/90.      Switching patient from " amlodipine 10 mg to spironolactone          Relevant Orders    TSH    T4, Free       Oncology    Iron deficiency anemia due to chronic blood loss    Relevant Orders    CBC Auto Differential       Endocrine    PCOS (polycystic ovarian syndrome) (Chronic)    Relevant Orders    Hemoglobin A1C    Prediabetes    Overview     The prediabetic range is 5.7 - 6.4. They will need to follow an ADA diet, avoiding soda, simple sweets, and limit rice/pasta/breads/starches.  Maintain healthy weight with a goal BMI less than 30.  Exercise 5 times per week for 30 minutes/day.  We will continue to monitor this lab and will recheck it in the future           Current Assessment & Plan     Stop metformin, trial Ozempic 0.25 mg Q week.  Sent to pharmacy for patient.          Other Visit Diagnoses       Encounter for wellness examination        Relevant Orders    TSH    T4, Free    Hemoglobin A1C    Lipid Panel    CBC Auto Differential    Comprehensive Metabolic Panel    Vitamin D              Meghna was seen today for follow-up.    Diagnoses and all orders for this visit:    Essential hypertension  -     TSH  -     T4, Free    Iron deficiency anemia due to chronic blood loss  -     CBC Auto Differential    PCOS (polycystic ovarian syndrome)  -     Hemoglobin A1C    Encounter for wellness examination  -     TSH  -     T4, Free  -     Hemoglobin A1C  -     Lipid Panel  -     CBC Auto Differential  -     Comprehensive Metabolic Panel  -     Vitamin D    Prediabetes         Follow up in about 4 weeks (around 7/3/2023) for Virtual Visit, med [facility].     This note was created with the assistance of Dragon voice recognition software or phone dictation. There may be transcription errors as a result of using this technology however minimal. Effort has been made to assure accuracy of transcription but any obvious errors or omissions should be clarified with the author of the document

## 2023-06-08 ENCOUNTER — PATIENT MESSAGE (OUTPATIENT)
Dept: FAMILY MEDICINE | Facility: CLINIC | Age: 28
End: 2023-06-08
Payer: MEDICAID

## 2023-06-08 RX ORDER — ERGOCALCIFEROL 1.25 MG/1
50000 CAPSULE ORAL
Qty: 12 CAPSULE | Refills: 0 | Status: SHIPPED | OUTPATIENT
Start: 2023-06-08 | End: 2023-08-25

## 2023-06-08 RX ORDER — FERROUS SULFATE 325(65) MG
325 TABLET, DELAYED RELEASE (ENTERIC COATED) ORAL
Qty: 90 TABLET | Refills: 3 | Status: SHIPPED | OUTPATIENT
Start: 2023-06-08

## 2023-06-14 ENCOUNTER — PATIENT MESSAGE (OUTPATIENT)
Dept: FAMILY MEDICINE | Facility: CLINIC | Age: 28
End: 2023-06-14
Payer: MEDICAID

## 2023-06-15 ENCOUNTER — PATIENT MESSAGE (OUTPATIENT)
Dept: FAMILY MEDICINE | Facility: CLINIC | Age: 28
End: 2023-06-15
Payer: MEDICAID

## 2023-06-16 ENCOUNTER — PATIENT MESSAGE (OUTPATIENT)
Dept: FAMILY MEDICINE | Facility: CLINIC | Age: 28
End: 2023-06-16
Payer: MEDICAID

## 2023-06-20 ENCOUNTER — HOSPITAL ENCOUNTER (EMERGENCY)
Facility: HOSPITAL | Age: 28
Discharge: HOME | End: 2023-06-20
Attending: INTERNAL MEDICINE
Payer: MEDICAID

## 2023-06-20 VITALS
SYSTOLIC BLOOD PRESSURE: 126 MMHG | BODY MASS INDEX: 26.46 KG/M2 | HEIGHT: 64 IN | DIASTOLIC BLOOD PRESSURE: 91 MMHG | OXYGEN SATURATION: 97 % | RESPIRATION RATE: 19 BRPM | WEIGHT: 155 LBS | TEMPERATURE: 99 F | HEART RATE: 78 BPM

## 2023-06-20 DIAGNOSIS — R11.2 NAUSEA AND VOMITING, UNSPECIFIED VOMITING TYPE: Primary | ICD-10-CM

## 2023-06-20 LAB
ALBUMIN SERPL-MCNC: 3.2 G/DL (ref 3.5–5)
ALBUMIN/GLOB SERPL: 0.9 RATIO (ref 1.1–2)
ALP SERPL-CCNC: 51 UNIT/L (ref 40–150)
ALT SERPL-CCNC: 9 UNIT/L (ref 0–55)
APPEARANCE UR: CLEAR
AST SERPL-CCNC: 12 UNIT/L (ref 5–34)
B-HCG SERPL QL: NEGATIVE
BASOPHILS # BLD AUTO: 0.04 X10(3)/MCL
BASOPHILS NFR BLD AUTO: 0.6 %
BILIRUB UR QL STRIP.AUTO: NEGATIVE MG/DL
BILIRUBIN DIRECT+TOT PNL SERPL-MCNC: 0.3 MG/DL
BUN SERPL-MCNC: 9.3 MG/DL (ref 7–18.7)
CALCIUM SERPL-MCNC: 9.2 MG/DL (ref 8.4–10.2)
CHLORIDE SERPL-SCNC: 108 MMOL/L (ref 98–107)
CO2 SERPL-SCNC: 25 MMOL/L (ref 22–29)
COLOR UR: NORMAL
CREAT SERPL-MCNC: 0.78 MG/DL (ref 0.55–1.02)
EOSINOPHIL # BLD AUTO: 0.13 X10(3)/MCL (ref 0–0.9)
EOSINOPHIL NFR BLD AUTO: 1.9 %
ERYTHROCYTE [DISTWIDTH] IN BLOOD BY AUTOMATED COUNT: 13.1 % (ref 11.5–17)
GFR SERPLBLD CREATININE-BSD FMLA CKD-EPI: >60 MLS/MIN/1.73/M2
GLOBULIN SER-MCNC: 3.6 GM/DL (ref 2.4–3.5)
GLUCOSE SERPL-MCNC: 116 MG/DL (ref 74–100)
GLUCOSE UR QL STRIP.AUTO: NEGATIVE MG/DL
HCT VFR BLD AUTO: 34.8 % (ref 37–47)
HGB BLD-MCNC: 11.7 G/DL (ref 12–16)
IMM GRANULOCYTES # BLD AUTO: 0.01 X10(3)/MCL (ref 0–0.04)
IMM GRANULOCYTES NFR BLD AUTO: 0.1 %
KETONES UR QL STRIP.AUTO: NEGATIVE MG/DL
LEUKOCYTE ESTERASE UR QL STRIP.AUTO: NEGATIVE UNIT/L
LIPASE SERPL-CCNC: 18 U/L
LYMPHOCYTES # BLD AUTO: 1.91 X10(3)/MCL (ref 0.6–4.6)
LYMPHOCYTES NFR BLD AUTO: 28 %
MAGNESIUM SERPL-MCNC: 1.7 MG/DL (ref 1.6–2.6)
MCH RBC QN AUTO: 28.8 PG (ref 27–31)
MCHC RBC AUTO-ENTMCNC: 33.6 G/DL (ref 33–36)
MCV RBC AUTO: 85.7 FL (ref 80–94)
MONOCYTES # BLD AUTO: 0.71 X10(3)/MCL (ref 0.1–1.3)
MONOCYTES NFR BLD AUTO: 10.4 %
NEUTROPHILS # BLD AUTO: 4.03 X10(3)/MCL (ref 2.1–9.2)
NEUTROPHILS NFR BLD AUTO: 59 %
NITRITE UR QL STRIP.AUTO: NEGATIVE
NRBC BLD AUTO-RTO: 0 %
PH UR STRIP.AUTO: 6.5 [PH]
PLATELET # BLD AUTO: 273 X10(3)/MCL (ref 130–400)
PMV BLD AUTO: 10.7 FL (ref 7.4–10.4)
POTASSIUM SERPL-SCNC: 3.5 MMOL/L (ref 3.5–5.1)
PROT SERPL-MCNC: 6.8 GM/DL (ref 6.4–8.3)
PROT UR QL STRIP.AUTO: NEGATIVE MG/DL
RBC # BLD AUTO: 4.06 X10(6)/MCL (ref 4.2–5.4)
RBC UR QL AUTO: NEGATIVE UNIT/L
SODIUM SERPL-SCNC: 139 MMOL/L (ref 136–145)
SP GR UR STRIP.AUTO: 1.01
UROBILINOGEN UR STRIP-ACNC: 0.2 MG/DL
WBC # SPEC AUTO: 6.83 X10(3)/MCL (ref 4.5–11.5)

## 2023-06-20 PROCEDURE — 96374 THER/PROPH/DIAG INJ IV PUSH: CPT

## 2023-06-20 PROCEDURE — 83690 ASSAY OF LIPASE: CPT | Performed by: INTERNAL MEDICINE

## 2023-06-20 PROCEDURE — 25000003 PHARM REV CODE 250: Performed by: INTERNAL MEDICINE

## 2023-06-20 PROCEDURE — 81025 URINE PREGNANCY TEST: CPT | Performed by: INTERNAL MEDICINE

## 2023-06-20 PROCEDURE — 80053 COMPREHEN METABOLIC PANEL: CPT | Performed by: INTERNAL MEDICINE

## 2023-06-20 PROCEDURE — 85025 COMPLETE CBC W/AUTO DIFF WBC: CPT | Performed by: INTERNAL MEDICINE

## 2023-06-20 PROCEDURE — 99284 EMERGENCY DEPT VISIT MOD MDM: CPT | Mod: 25

## 2023-06-20 PROCEDURE — 81003 URINALYSIS AUTO W/O SCOPE: CPT | Performed by: INTERNAL MEDICINE

## 2023-06-20 PROCEDURE — 83735 ASSAY OF MAGNESIUM: CPT | Performed by: INTERNAL MEDICINE

## 2023-06-20 PROCEDURE — 63600175 PHARM REV CODE 636 W HCPCS: Performed by: INTERNAL MEDICINE

## 2023-06-20 PROCEDURE — 96361 HYDRATE IV INFUSION ADD-ON: CPT

## 2023-06-20 RX ORDER — PROMETHAZINE HYDROCHLORIDE 25 MG/1
25 TABLET ORAL EVERY 6 HOURS PRN
Qty: 15 TABLET | Refills: 0 | Status: SHIPPED | OUTPATIENT
Start: 2023-06-20 | End: 2023-10-02

## 2023-06-20 RX ORDER — MIDAZOLAM HYDROCHLORIDE 1 MG/ML
2 INJECTION INTRAMUSCULAR; INTRAVENOUS
Status: COMPLETED | OUTPATIENT
Start: 2023-06-20 | End: 2023-06-20

## 2023-06-20 RX ORDER — ONDANSETRON 4 MG/1
4 TABLET, ORALLY DISINTEGRATING ORAL EVERY 6 HOURS PRN
Qty: 15 TABLET | Refills: 0 | Status: SHIPPED | OUTPATIENT
Start: 2023-06-20 | End: 2023-10-02

## 2023-06-20 RX ORDER — METOCLOPRAMIDE HYDROCHLORIDE 5 MG/ML
5 INJECTION INTRAMUSCULAR; INTRAVENOUS ONCE
Status: DISCONTINUED | OUTPATIENT
Start: 2023-06-20 | End: 2023-06-20

## 2023-06-20 RX ADMIN — SODIUM CHLORIDE 1000 ML: 9 INJECTION, SOLUTION INTRAVENOUS at 09:06

## 2023-06-20 RX ADMIN — MIDAZOLAM 2 MG: 1 INJECTION INTRAMUSCULAR; INTRAVENOUS at 10:06

## 2023-06-21 NOTE — ED PROVIDER NOTES
Pre-Hospital Care: Reroute from [facility] ED ([facility] [place])      Source of History:  Patient, no limitations    Chief complaint:  Vomiting      HPI:  Meghna Dozier is a 27 y.o. female presenting with Vomiting       26yo F hx of DM2, HTN presents with N/V associated with first Ozempic dose last Wednesday  Presents with nausea and vomiting of dry heaves.  Onset of symptoms was abrupt starting 1 week ago with unchanged course since that time. Symptoms have been occuring  intermittently.  Outpatient therapy with none has been attempted and symptoms have failed to improve. Symptoms are currently rated moderate. Associated signs & symptoms include none.        Review of Systems   Constitutional symptoms:  Negative except as documented in HPI.   Skin symptoms:  Negative except as documented in HPI.   HEENT symptoms:  Negative except as documented in HPI.   Respiratory symptoms:  Negative except as documented in HPI.   Cardiovascular symptoms:  Negative except as documented in HPI.   Gastrointestinal symptoms:  Negative except as documented in HPI.    Genitourinary symptoms:  Negative except as documented in HPI.   Musculoskeletal symptoms:  Negative except as documented in HPI.   Neurologic symptoms:  Negative except as documented in HPI.   Psychiatric symptoms:  Negative except as documented in HPI.   Allergy/immunologic symptoms:  Negative except as documented in HPI.             Additional review of systems information: All other systems reviewed and otherwise negative.      Review of patient's allergies indicates:   Allergen Reactions    Vancomycin analogues Itching and Swelling     Swollen lips   Swollen lips          PMH:  As per HPI and below:    Past Medical History:   Diagnosis Date    Anemia     Anxiety     Hypertension         Family History   Problem Relation Age of Onset    Hypertension Father     Diabetes Father     Hypertension Mother        Past Surgical History:   Procedure Laterality Date     SECTION   "    LAPAROSCOPIC REPAIR OF UMBILICAL HERNIA N/A 8/31/2022    Procedure: REPAIR, HERNIA, UMBILICAL, LAPAROSCOPIC;  Surgeon: Keshia Gu MD;  Location: [facility];  Service: General;  Laterality: N/A;       Social History     Tobacco Use    Smoking status: Never    Smokeless tobacco: Never   Substance Use Topics    Alcohol use: No    Drug use: No       Patient Active Problem List   Diagnosis    Iron deficiency anemia due to chronic blood loss    Essential hypertension    PCOS (polycystic ovarian syndrome)    Umbilical hernia    Prediabetes        Physical Exam:    /71   Pulse 89   Temp 98.7 °F (37.1 °C) (Oral)   Resp 18   Ht 5' 4" (1.626 m)   Wt 70.3 kg (155 lb)   LMP 05/03/2023 (Exact Date)   SpO2 97%   BMI 26.61 kg/m²     Nursing note and vital signs reviewed.    General:  Alert, no acute distress.   Skin: Normal for Ethnic Origin, No cyanosis  HEENT: Normocephalic and atraumatic, Vision unchanged, Pupils symmetric, No icterus , Nasal mucosa is pink and moist  Cardiovascular:  Regular rate and rhythm, No edema  Chest Wall: No deformity, equal chest rise  Respiratory:  Lungs are clear to auscultation, respirations are non-labored.    Musculoskeletal:  No deformity, Normal perfusion to all extremities  Gastrointestinal:  Soft, Non distended  Neurological:  Alert and oriented, normal motor observed, normal speech observed.    Psychiatric:  Cooperative, appropriate mood & affect.        Labs that have been ordered have been independently reviewed and interpreted by myself.     Old Chart Reviewed.      Initial Impression/ Differential Dx:  Gastritis, viral gastroenteritis, pancreatitis, cholecystitis, ileus, small bowel obstruction, appendicitis.      MDM:      Reviewed Nurses Note.    Reviewed Pertinent old records.    Orders Placed This Encounter    X-Ray Abdomen Flat And Erect    CBC Auto Differential    Comprehensive Metabolic Panel    Magnesium    CBC with Differential    Lipase    Urinalysis, Reflex " to Urine Culture    Pregnancy, urine rapid    Insert peripheral IV    sodium chloride 0.9% bolus 1,000 mL 1,000 mL    midazolam (VERSED) 1 mg/mL injection 2 mg    ondansetron (ZOFRAN-ODT) 4 MG TbDL    promethazine (PHENERGAN) 25 MG tablet                    Labs Reviewed   COMPREHENSIVE METABOLIC PANEL - Abnormal; Notable for the following components:       Result Value    Chloride 108 (*)     Glucose Level 116 (*)     Albumin Level 3.2 (*)     Globulin 3.6 (*)     Albumin/Globulin Ratio 0.9 (*)     All other components within normal limits   CBC WITH DIFFERENTIAL - Abnormal; Notable for the following components:    RBC 4.06 (*)     Hgb 11.7 (*)     Hct 34.8 (*)     MPV 10.7 (*)     All other components within normal limits   MAGNESIUM - Normal   LIPASE - Normal   PREGNANCY TEST, URINE RAPID - Normal   CBC W/ AUTO DIFFERENTIAL    Narrative:     The following orders were created for panel order CBC Auto Differential.  Procedure                               Abnormality         Status                     ---------                               -----------         ------                     CBC with Differential[[id number]]        Abnormal            Final result                 Please view results for these tests on the individual orders.   URINALYSIS, REFLEX TO URINE CULTURE          X-Ray Abdomen Flat And Erect   Final Result      Moderate amount of stool in the colon         Electronically signed by: Shabbir Hall MD   Date:    06/20/2023   Time:    22:39           Admission on 06/20/2023   Component Date Value Ref Range Status    Sodium Level 06/20/2023 139  136 - 145 mmol/L Final    Potassium Level 06/20/2023 3.5  3.5 - 5.1 mmol/L Final    Chloride 06/20/2023 108 (H)  98 - 107 mmol/L Final    Carbon Dioxide 06/20/2023 25  22 - 29 mmol/L Final    Glucose Level 06/20/2023 116 (H)  74 - 100 mg/dL Final    Blood Urea Nitrogen 06/20/2023 9.3  7.0 - 18.7 mg/dL Final    Creatinine 06/20/2023 0.78  0.55 - 1.02 mg/dL Final     Calcium Level Total 06/20/2023 9.2  8.4 - 10.2 mg/dL Final    Protein Total 06/20/2023 6.8  6.4 - 8.3 gm/dL Final    Albumin Level 06/20/2023 3.2 (L)  3.5 - 5.0 g/dL Final    Globulin 06/20/2023 3.6 (H)  2.4 - 3.5 gm/dL Final    Albumin/Globulin Ratio 06/20/2023 0.9 (L)  1.1 - 2.0 ratio Final    Bilirubin Total 06/20/2023 0.3  <=1.5 mg/dL Final    Alkaline Phosphatase 06/20/2023 51  40 - 150 unit/L Final    Alanine Aminotransferase 06/20/2023 9  0 - 55 unit/L Final    Aspartate Aminotransferase 06/20/2023 12  5 - 34 unit/L Final    eGFR 06/20/2023 >60  mls/min/1.73/m2 Final    Magnesium Level 06/20/2023 1.70  1.60 - 2.60 mg/dL Final    WBC 06/20/2023 6.83  4.50 - 11.50 x10(3)/mcL Final    RBC 06/20/2023 4.06 (L)  4.20 - 5.40 x10(6)/mcL Final    Hgb 06/20/2023 11.7 (L)  12.0 - 16.0 g/dL Final    Hct 06/20/2023 34.8 (L)  37.0 - 47.0 % Final    MCV 06/20/2023 85.7  80.0 - 94.0 fL Final    MCH 06/20/2023 28.8  27.0 - 31.0 pg Final    MCHC 06/20/2023 33.6  33.0 - 36.0 g/dL Final    RDW 06/20/2023 13.1  11.5 - 17.0 % Final    Platelet 06/20/2023 273  130 - 400 x10(3)/mcL Final    MPV 06/20/2023 10.7 (H)  7.4 - 10.4 fL Final    Neut % 06/20/2023 59.0  % Final    Lymph % 06/20/2023 28.0  % Final    Mono % 06/20/2023 10.4  % Final    Eos % 06/20/2023 1.9  % Final    Basophil % 06/20/2023 0.6  % Final    Lymph # 06/20/2023 1.91  0.6 - 4.6 x10(3)/mcL Final    Neut # 06/20/2023 4.03  2.1 - 9.2 x10(3)/mcL Final    Mono # 06/20/2023 0.71  0.1 - 1.3 x10(3)/mcL Final    Eos # 06/20/2023 0.13  0 - 0.9 x10(3)/mcL Final    Baso # 06/20/2023 0.04  <=0.2 x10(3)/mcL Final    IG# 06/20/2023 0.01  0 - 0.04 x10(3)/mcL Final    IG% 06/20/2023 0.1  % Final    NRBC% 06/20/2023 0.0  % Final    Lipase Level 06/20/2023 18  <=60 U/L Final    Color, UA 06/20/2023 Straw  Yellow, Light-Yellow, Dark Yellow, Bernice, Straw Final    Appearance, UA 06/20/2023 Clear  Clear Final    Specific Gravity, UA 06/20/2023 1.015   Final    pH, UA 06/20/2023 6.5   5.0 - 8.5 Final    Protein, UA 06/20/2023 Negative  Negative mg/dL Final    Glucose, UA 06/20/2023 Negative  Negative, Normal mg/dL Final    Ketones, UA 06/20/2023 Negative  Negative mg/dL Final    Blood, UA 06/20/2023 Negative  Negative unit/L Final    Bilirubin, UA 06/20/2023 Negative  Negative mg/dL Final    Urobilinogen, UA 06/20/2023 0.2  0.2, 1.0, Normal mg/dL Final    Nitrites, UA 06/20/2023 Negative  Negative Final    Leukocyte Esterase, UA 06/20/2023 Negative  Negative unit/L Final    Beta hCG Qualitative, Urine 06/20/2023 Negative  Negative Final       Imaging Results              X-Ray Abdomen Flat And Erect (Final result)  Result time 06/20/23 22:39:57      Final result by Shabbir Hall MD (06/20/23 22:39:57)                   Impression:      Moderate amount of stool in the colon      Electronically signed by: Shabbir Hall MD  Date:    06/20/2023  Time:    22:39               Narrative:    EXAMINATION:  XR ABDOMEN FLAT AND ERECT    CLINICAL HISTORY:  vomit;    TECHNIQUE:  Flat and erect AP views of the abdomen were performed.    COMPARISON:  None    FINDINGS:  Bowel gas pattern is unremarkable.  There are no dilated loops of small bowel seen.  There is a moderate amount of stool in the colon                                                     ED Course as of 06/20/23 2246 Tue Jun 20, 2023   2200 Leukocytes, UA: Negative [MP]   2200 NITRITE UA: Negative [MP]   2200 WBC: 6.83 [MP]   2208 Preg Test, Ur: Negative [MP]   2219 Lipase: 18 [MP]   2219 Glucose(!): 116 [MP]   2219 Magnesium: 1.70 [MP]      ED Course User Index  [MP] Alcon Byers DO                        Diagnostic Impression:    1. Nausea and vomiting, unspecified vomiting type         ED Disposition Condition    Discharge Stable             Follow-up Information       [facility] - Emergency Dept.    Specialty: Emergency Medicine  Why: If symptoms worsen  Contact information:  [address]  [facility] [address]  [phone]                            ED Prescriptions       Medication Sig Dispense Start Date End Date Auth. Provider    ondansetron (ZOFRAN-ODT) 4 MG TbDL Take 1 tablet (4 mg total) by mouth every 6 (six) hours as needed (nausea). 15 tablet 6/20/2023 -- Alcon Byers DO    promethazine (PHENERGAN) 25 MG tablet Take 1 tablet (25 mg total) by mouth every 6 (six) hours as needed for Nausea. 15 tablet 6/20/2023 -- Alcon Byers DO          Follow-up Information       Follow up With Specialties Details Why Contact Info    [facility] - Emergency Dept Emergency Medicine  If symptoms worsen [address]  [facility] [address]  [phone]             Alcon Byers DO  06/20/23 0278

## 2023-06-29 ENCOUNTER — OFFICE VISIT (OUTPATIENT)
Dept: FAMILY MEDICINE | Facility: CLINIC | Age: 28
End: 2023-06-29
Payer: MEDICAID

## 2023-06-29 DIAGNOSIS — I10 ESSENTIAL HYPERTENSION: Chronic | ICD-10-CM

## 2023-06-29 DIAGNOSIS — E28.2 PCOS (POLYCYSTIC OVARIAN SYNDROME): Chronic | ICD-10-CM

## 2023-06-29 DIAGNOSIS — R73.03 PREDIABETES: ICD-10-CM

## 2023-06-29 PROCEDURE — 1159F PR MEDICATION LIST DOCUMENTED IN MEDICAL RECORD: ICD-10-PCS | Mod: CPTII,95,,

## 2023-06-29 PROCEDURE — 1160F PR REVIEW ALL MEDS BY PRESCRIBER/CLIN PHARMACIST DOCUMENTED: ICD-10-PCS | Mod: CPTII,95,,

## 2023-06-29 PROCEDURE — 99214 PR OFFICE/OUTPT VISIT, EST, LEVL IV, 30-39 MIN: ICD-10-PCS | Mod: 95,,,

## 2023-06-29 PROCEDURE — 1160F RVW MEDS BY RX/DR IN RCRD: CPT | Mod: CPTII,95,,

## 2023-06-29 PROCEDURE — 1159F MED LIST DOCD IN RCRD: CPT | Mod: CPTII,95,,

## 2023-06-29 PROCEDURE — 99214 OFFICE O/P EST MOD 30 MIN: CPT | Mod: 95,,,

## 2023-06-29 RX ORDER — SEMAGLUTIDE 0.68 MG/ML
0.5 INJECTION, SOLUTION SUBCUTANEOUS
Qty: 3 ML | Refills: 2 | Status: SHIPPED | OUTPATIENT
Start: 2023-06-29 | End: 2023-09-27

## 2023-06-29 RX ORDER — SPIRONOLACTONE 25 MG/1
25 TABLET ORAL DAILY
Qty: 90 TABLET | Refills: 3 | Status: SHIPPED | OUTPATIENT
Start: 2023-06-29 | End: 2023-07-14 | Stop reason: SDUPTHER

## 2023-06-29 NOTE — PROGRESS NOTES
Audio/visual Telehealth Visit     The patient location is:  [place]  The chief complaint leading to consultation is: medication followup, PCOS  Visit type:  Synchronous audio visual  Total time spent with patient: 15min     Each patient to whom I provide medical services by telemedicine is:  (1) informed of the relationship between the physician and patient and the respective role of any other health care provider with respect to management of the patient; and (2) notified that they may decline to receive medical services by telemedicine and may withdraw from such care at any time. Patient verbally consented to receive this service via audio/visual call.      Patient Name: Meghna Dozier   : 1995  MRN: [id number]     Subjective:   Patient ID: Meghna Dozier is a 27 y.o. female.    Chief Complaint: medication follow up, PCOS       HPI: 2023:  Will visit with patient today to follow-up on medications started that last office visit.  Patient states that she is finally getting used Ozempic, denies nausea abdominal cramping anymore.  Patient states that her appetite is starting to increase again and is amenable to increasing dose.  Also states that she is noticing decrease in hair growth on her face since starting aldactone, states that her blood pressure readings at home have all been normotensive. Patient denies chest pain, palpitations, and shortness of breath.  Patient denies fever, night sweats, chills, nausea, vomiting, diarrhea, constipation, weight loss, and changes in appetite.    2023:  Patient self scheduled appointment today to discuss PCOS and prediabetes medications.  She was prescribed oral metformin to be taking daily and has had difficult time with compliance due to upset stomach, nausea.  Patient is no longer able to take this medication in his curious if there is another medicine to help manage her prediabetes levels.  Patient denies any personal or family history of thyroid medullary  cancer or thyroid disease.  Patient states that since stopping her oral metformin she has noticed increase in her facial hair again, endorses inability to lose weight despite participating in aerobic boot camp, gym membership.    11/16/2022:  patient has failed to follow up for routine follow up, today she made clinic appointment for facial hair and chronic cough. Patient states cough is non productive, she denies any recent infections. Patient states cough is worse at night or when she gets overheated. She has never been diagnosed with asthma, endorses allergies.  She is been taking Flonase and Claritin for about 3 months now and this has not improved her cough any.  Patient willing to discuss her options for management of PCOS, she states that it seems to be getting worse again in his noticing more facial hair on her face.  Patient previously had tried metformin with her gynecologist but was inconsistent due to side effects of medication.    07/20/2022: her routine wellness labs were never collected, will recollect those today. Switch to 10mg amlodipine has been working well for her. She is monitoring at home with readings in the 120s over 80s, states she has never seen numbers like that. She has yet to hear from surgery clinic about her hernia.      07/06/2022:  Presenting to establish care.  She has not had PCP has been primarily managed by her gyn. She was previously on amlodipine 10 mg for this prior to her pregnancy. she is open to switching back to that medication, she is not breastfeeding or pregnant. She also has developed an umbilical hernia since pregnancy. It is getting progressively more painful and protruding more often.  It still retractable.  She will continue see gyn for annual Pap smears last 1 performed after childbirth in September of 2021.      ROS:  Review of Systems   Constitutional:  Negative for chills, fever and weight loss.   HENT:  Negative for ear discharge, nosebleeds and tinnitus.     Eyes:  Negative for blurred vision, photophobia and pain.   Respiratory:  Negative for cough, shortness of breath, wheezing and stridor.    Cardiovascular:  Negative for chest pain, palpitations and orthopnea.   Gastrointestinal:  Negative for abdominal pain, heartburn and nausea.   Genitourinary:  Negative for dysuria, frequency, hematuria and urgency.   Musculoskeletal:  Negative for falls and myalgias.   Skin:  Negative for itching and rash.   Neurological:  Negative for dizziness, sensory change, speech change, focal weakness, seizures, weakness and headaches.   Endo/Heme/Allergies:  Negative for environmental allergies. Does not bruise/bleed easily.   Psychiatric/Behavioral:  Negative for hallucinations and suicidal ideas.     History:     Past Medical History:   Diagnosis Date    Anemia     Anxiety     Hypertension       Past Surgical History:   Procedure Laterality Date     SECTION      LAPAROSCOPIC REPAIR OF UMBILICAL HERNIA N/A 2022    Procedure: REPAIR, HERNIA, UMBILICAL, LAPAROSCOPIC;  Surgeon: Keshia Gu MD;  Location: [facility];  Service: General;  Laterality: N/A;     Family History   Problem Relation Age of Onset    Hypertension Father     Diabetes Father     Hypertension Mother       Social History     Tobacco Use    Smoking status: Never    Smokeless tobacco: Never   Substance and Sexual Activity    Alcohol use: No    Drug use: No    Sexual activity: Yes     Partners: Male        Allergies:   Review of patient's allergies indicates:   Allergen Reactions    Vancomycin analogues Itching and Swelling     Swollen lips   Swollen lips        Objective:   There were no vitals filed for this visit.  There is no height or weight on file to calculate BMI.     Physical Examination:   Physical Exam  Constitutional:       General: She is not in acute distress.     Comments: Limited Physical Exam due to telemedicine visit   Pulmonary:      Effort: Pulmonary effort is normal. No respiratory  distress.   Neurological:      Mental Status: She is alert.   Psychiatric:         Mood and Affect: Mood normal.         Behavior: Behavior normal.       Assessment:     Problem List Items Addressed This Visit          Cardiac/Vascular    Essential hypertension (Chronic)    Overview     Low Sodium Diet (Dash Diet - less than 2 grams of sodium per day).  Monitor Blood Pressure daily and log. Report any consistent numbers greater than 140/90.  Smoking Cessation encouraged to aid in BP reduction.  Maintain healthy weight with goal BMI <30. Exercise 30 minutes per day 5 days per week           Current Assessment & Plan     Chronic, controlled.  Continue 25 mg of Aldactone daily.  Report any hypertensive readings to clinic.         Relevant Medications    spironolactone (ALDACTONE) 25 MG tablet       Endocrine    PCOS (polycystic ovarian syndrome) (Chronic)    Current Assessment & Plan     Continue Aldactone 25 mg daily         Prediabetes    Overview     The prediabetic range is 5.7 - 6.4. They will need to follow an ADA diet, avoiding soda, simple sweets, and limit rice/pasta/breads/starches.  Maintain healthy weight with a goal BMI less than 30.  Exercise 5 times per week for 30 minutes/day.  We will continue to monitor this lab and will recheck it in the future           Current Assessment & Plan     Patient is tolerating a is a big 0.25 mg q.week, increased to 0.5 mg q.week.         Relevant Medications    semaglutide (OZEMPIC) 0.25 mg or 0.5 mg (2 mg/3 mL) pen injector       Plan:   Diagnoses and all orders for this visit:    Prediabetes  -     semaglutide (OZEMPIC) 0.25 mg or 0.5 mg (2 mg/3 mL) pen injector; Inject 0.5 mg into the skin every 7 days.    PCOS (polycystic ovarian syndrome)    Essential hypertension  -     spironolactone (ALDACTONE) 25 MG tablet; Take 1 tablet (25 mg total) by mouth once daily.       Follow up in about 3 months (around 9/29/2023) for routine labs recheck (A1c POC).       This note was  created with the assistance of a voice recognition software or phone dictation. There may be transcription errors as a result of using this technology however minimal. Effort has been made to assure accuracy of transcription but any obvious errors or omissions should be clarified with the author of the document      This service was not originating from a related E/M service provided within the previous 7 days nor will  to an E/M service or procedure within the next 24 hours or my soonest available appointment.  Prevailing standard of care was able to be met in this audio-visual visit.

## 2023-06-29 NOTE — ASSESSMENT & PLAN NOTE
Chronic, controlled.  Continue 25 mg of Aldactone daily.  Report any hypertensive readings to clinic.

## 2023-07-14 ENCOUNTER — TELEPHONE (OUTPATIENT)
Dept: FAMILY MEDICINE | Facility: CLINIC | Age: 28
End: 2023-07-14
Payer: MEDICAID

## 2023-07-14 DIAGNOSIS — I10 ESSENTIAL HYPERTENSION: Chronic | ICD-10-CM

## 2023-07-14 RX ORDER — SPIRONOLACTONE 25 MG/1
25 TABLET ORAL DAILY
Qty: 90 TABLET | Refills: 3 | Status: SHIPPED | OUTPATIENT
Start: 2023-07-14 | End: 2023-10-02 | Stop reason: SDUPTHER

## 2023-09-06 RX ORDER — ERGOCALCIFEROL 1.25 MG/1
50000 CAPSULE ORAL
Qty: 4 CAPSULE | Refills: 0 | Status: SHIPPED | OUTPATIENT
Start: 2023-09-06

## 2023-10-02 ENCOUNTER — OFFICE VISIT (OUTPATIENT)
Dept: FAMILY MEDICINE | Facility: CLINIC | Age: 28
End: 2023-10-02
Payer: MEDICAID

## 2023-10-02 VITALS
BODY MASS INDEX: 25.64 KG/M2 | DIASTOLIC BLOOD PRESSURE: 89 MMHG | HEIGHT: 64 IN | SYSTOLIC BLOOD PRESSURE: 136 MMHG | WEIGHT: 150.19 LBS | HEART RATE: 79 BPM | TEMPERATURE: 98 F | OXYGEN SATURATION: 99 % | RESPIRATION RATE: 18 BRPM

## 2023-10-02 DIAGNOSIS — I10 ESSENTIAL HYPERTENSION: Chronic | ICD-10-CM

## 2023-10-02 DIAGNOSIS — D50.0 IRON DEFICIENCY ANEMIA DUE TO CHRONIC BLOOD LOSS: ICD-10-CM

## 2023-10-02 DIAGNOSIS — E28.2 PCOS (POLYCYSTIC OVARIAN SYNDROME): Chronic | ICD-10-CM

## 2023-10-02 DIAGNOSIS — Z78.9 INFLUENZA VACCINATION ORDERED: Primary | ICD-10-CM

## 2023-10-02 DIAGNOSIS — R73.03 PREDIABETES: ICD-10-CM

## 2023-10-02 LAB
BASOPHILS # BLD AUTO: 0.06 X10(3)/MCL
BASOPHILS NFR BLD AUTO: 1 %
DEPRECATED CALCIDIOL+CALCIFEROL SERPL-MC: 13.2 NG/ML (ref 30–80)
EOSINOPHIL # BLD AUTO: 0.17 X10(3)/MCL (ref 0–0.9)
EOSINOPHIL NFR BLD AUTO: 3 %
ERYTHROCYTE [DISTWIDTH] IN BLOOD BY AUTOMATED COUNT: 13.7 % (ref 11.5–17)
FERRITIN SERPL-MCNC: 20.69 NG/ML (ref 4.63–204)
HBA1C MFR BLD: 5 %
HCT VFR BLD AUTO: 40 % (ref 37–47)
HGB BLD-MCNC: 13.3 G/DL (ref 12–16)
IMM GRANULOCYTES # BLD AUTO: 0.02 X10(3)/MCL (ref 0–0.04)
IMM GRANULOCYTES NFR BLD AUTO: 0.3 %
IRON SATN MFR SERPL: 27 % (ref 20–50)
IRON SERPL-MCNC: 90 UG/DL (ref 50–170)
LYMPHOCYTES # BLD AUTO: 1.95 X10(3)/MCL (ref 0.6–4.6)
LYMPHOCYTES NFR BLD AUTO: 34 %
MCH RBC QN AUTO: 29.8 PG (ref 27–31)
MCHC RBC AUTO-ENTMCNC: 33.3 G/DL (ref 33–36)
MCV RBC AUTO: 89.7 FL (ref 80–94)
MONOCYTES # BLD AUTO: 0.65 X10(3)/MCL (ref 0.1–1.3)
MONOCYTES NFR BLD AUTO: 11.3 %
NEUTROPHILS # BLD AUTO: 2.89 X10(3)/MCL (ref 2.1–9.2)
NEUTROPHILS NFR BLD AUTO: 50.4 %
NRBC BLD AUTO-RTO: 0 %
PLATELET # BLD AUTO: 296 X10(3)/MCL (ref 130–400)
PMV BLD AUTO: 10.9 FL (ref 7.4–10.4)
RBC # BLD AUTO: 4.46 X10(6)/MCL (ref 4.2–5.4)
TIBC SERPL-MCNC: 242 UG/DL (ref 70–310)
TIBC SERPL-MCNC: 332 UG/DL (ref 250–450)
TRANSFERRIN SERPL-MCNC: 299 MG/DL (ref 180–382)
VIT B12 SERPL-MCNC: 442 PG/ML (ref 213–816)
WBC # SPEC AUTO: 5.74 X10(3)/MCL (ref 4.5–11.5)

## 2023-10-02 PROCEDURE — 90471 IMMUNIZATION ADMIN: CPT | Mod: PBBFAC,PN

## 2023-10-02 PROCEDURE — 85025 COMPLETE CBC W/AUTO DIFF WBC: CPT

## 2023-10-02 PROCEDURE — 1159F MED LIST DOCD IN RCRD: CPT | Mod: CPTII,,,

## 2023-10-02 PROCEDURE — 90686 IIV4 VACC NO PRSV 0.5 ML IM: CPT | Mod: PBBFAC,PN

## 2023-10-02 PROCEDURE — 3075F SYST BP GE 130 - 139MM HG: CPT | Mod: CPTII,,,

## 2023-10-02 PROCEDURE — 3079F DIAST BP 80-89 MM HG: CPT | Mod: CPTII,,,

## 2023-10-02 PROCEDURE — 3044F HG A1C LEVEL LT 7.0%: CPT | Mod: CPTII,,,

## 2023-10-02 PROCEDURE — 3079F PR MOST RECENT DIASTOLIC BLOOD PRESSURE 80-89 MM HG: ICD-10-PCS | Mod: CPTII,,,

## 2023-10-02 PROCEDURE — 3008F BODY MASS INDEX DOCD: CPT | Mod: CPTII,,,

## 2023-10-02 PROCEDURE — 99395 PREV VISIT EST AGE 18-39: CPT | Mod: S$PBB,,,

## 2023-10-02 PROCEDURE — 3008F PR BODY MASS INDEX (BMI) DOCUMENTED: ICD-10-PCS | Mod: CPTII,,,

## 2023-10-02 PROCEDURE — 1160F PR REVIEW ALL MEDS BY PRESCRIBER/CLIN PHARMACIST DOCUMENTED: ICD-10-PCS | Mod: CPTII,,,

## 2023-10-02 PROCEDURE — 82607 VITAMIN B-12: CPT

## 2023-10-02 PROCEDURE — 82306 VITAMIN D 25 HYDROXY: CPT

## 2023-10-02 PROCEDURE — 82728 ASSAY OF FERRITIN: CPT

## 2023-10-02 PROCEDURE — 1160F RVW MEDS BY RX/DR IN RCRD: CPT | Mod: CPTII,,,

## 2023-10-02 PROCEDURE — 99214 OFFICE O/P EST MOD 30 MIN: CPT | Mod: PBBFAC,PN

## 2023-10-02 PROCEDURE — 3075F PR MOST RECENT SYSTOLIC BLOOD PRESS GE 130-139MM HG: ICD-10-PCS | Mod: CPTII,,,

## 2023-10-02 PROCEDURE — 83036 HEMOGLOBIN GLYCOSYLATED A1C: CPT | Mod: PBBFAC,PN

## 2023-10-02 PROCEDURE — 3044F PR MOST RECENT HEMOGLOBIN A1C LEVEL <7.0%: ICD-10-PCS | Mod: CPTII,,,

## 2023-10-02 PROCEDURE — 99395 PR PREVENTIVE VISIT,EST,18-39: ICD-10-PCS | Mod: S$PBB,,,

## 2023-10-02 PROCEDURE — 36415 COLL VENOUS BLD VENIPUNCTURE: CPT

## 2023-10-02 PROCEDURE — 83550 IRON BINDING TEST: CPT

## 2023-10-02 PROCEDURE — 1159F PR MEDICATION LIST DOCUMENTED IN MEDICAL RECORD: ICD-10-PCS | Mod: CPTII,,,

## 2023-10-02 RX ORDER — SPIRONOLACTONE 50 MG/1
50 TABLET, FILM COATED ORAL DAILY
Qty: 90 TABLET | Refills: 3 | Status: SHIPPED | OUTPATIENT
Start: 2023-10-02 | End: 2024-10-01

## 2023-10-02 RX ADMIN — INFLUENZA VIRUS VACCINE 0.5 ML: 15; 15; 15; 15 SUSPENSION INTRAMUSCULAR at 09:10

## 2023-10-02 NOTE — ASSESSMENT & PLAN NOTE
Stable and controlled, continue dietary modifications.  Patient is no longer in prediabetic range.

## 2023-10-02 NOTE — PROGRESS NOTES
Patient Name: Meghna Dozier     : 1995    MRN: [id number]     Subjective:     Patient ID: Meghna Dozier is a 27 y.o. female.    Chief Complaint:   Chief Complaint   Patient presents with    Follow-up     Follow up. Possible Ozempic refill.         HPI: 10/02/2023:  Patient presents for annual wellness exam as well as routine follow-up on prediabetes, hypertension, PCOS and anemia.  Patient states that she is not been very compliant with her isn't big secondary to abdominal discomfort and nausea, patient has been more active and going to the gym as well as following a stricter diet.  Patient states that she is still losing weight and does not want to continue the metformin.  Point of care A1c in clinic today 5.0. Patient denies chest pain, palpitations, and shortness of breath.  Patient denies fever, night sweats, chills, nausea, vomiting, diarrhea, constipation, weight loss, and changes in appetite.    2023:  Will visit with patient today to follow-up on medications started that last office visit.  Patient states that she is finally getting used Ozempic, denies nausea abdominal cramping anymore.  Patient states that her appetite is starting to increase again and is amenable to increasing dose.  Also states that she is noticing decrease in hair growth on her face since starting aldactone, states that her blood pressure readings at home have all been normotensive. Patient denies chest pain, palpitations, and shortness of breath.  Patient denies fever, night sweats, chills, nausea, vomiting, diarrhea, constipation, weight loss, and changes in appetite.    2023:  Patient self scheduled appointment today to discuss PCOS and prediabetes medications.  She was prescribed oral metformin to be taking daily and has had difficult time with compliance due to upset stomach, nausea.  Patient is no longer able to take this medication in his curious if there is another medicine to help manage her prediabetes levels.   Patient denies any personal or family history of thyroid medullary cancer or thyroid disease.  Patient states that since stopping her oral metformin she has noticed increase in her facial hair again, endorses inability to lose weight despite participating in aerobic boot camp, gym membership.    11/16/2022:  patient has failed to follow up for routine follow up, today she made clinic appointment for facial hair and chronic cough. Patient states cough is non productive, she denies any recent infections. Patient states cough is worse at night or when she gets overheated. She has never been diagnosed with asthma, endorses allergies.  She is been taking Flonase and Claritin for about 3 months now and this has not improved her cough any.  Patient willing to discuss her options for management of PCOS, she states that it seems to be getting worse again in his noticing more facial hair on her face.  Patient previously had tried metformin with her gynecologist but was inconsistent due to side effects of medication.    07/20/2022: her routine wellness labs were never collected, will recollect those today. Switch to 10mg amlodipine has been working well for her. She is monitoring at home with readings in the 120s over 80s, states she has never seen numbers like that. She has yet to hear from surgery clinic about her hernia.      07/06/2022:  Presenting to establish care.  She has not had PCP has been primarily managed by her gyn. She was previously on amlodipine 10 mg for this prior to her pregnancy. she is open to switching back to that medication, she is not breastfeeding or pregnant. She also has developed an umbilical hernia since pregnancy. It is getting progressively more painful and protruding more often.  It still retractable.  She will continue see gyn for annual Pap smears last 1 performed after childbirth in September of 2021.        ROS:       12 point review of systems conducted, negative except as stated in the  "history of present illness. See HPI for details.    History:     Past Medical History:   Diagnosis Date    Anemia     Anxiety     Hypertension         Past Surgical History:   Procedure Laterality Date     SECTION      LAPAROSCOPIC REPAIR OF UMBILICAL HERNIA N/A 2022    Procedure: REPAIR, HERNIA, UMBILICAL, LAPAROSCOPIC;  Surgeon: Keshia Gu MD;  Location: [facility];  Service: General;  Laterality: N/A;       Family History   Problem Relation Age of Onset    Hypertension Father     Diabetes Father     Hypertension Mother         Social History     Tobacco Use    Smoking status: Never    Smokeless tobacco: Never   Substance and Sexual Activity    Alcohol use: No    Drug use: No    Sexual activity: Yes     Partners: Male       Current Outpatient Medications   Medication Instructions    albuterol (PROVENTIL HFA) 90 mcg/actuation inhaler 2 puffs, Inhalation, Every 6 hours PRN, Rescue    cyclobenzaprine (FLEXERIL) 10 mg, Oral, Every 8 hours PRN    ergocalciferol (ERGOCALCIFEROL) 50,000 Units, Oral, Every 7 days    ferrous sulfate 325 mg, Oral, 3 times daily with meals    fluticasone propionate (FLONASE) 50 mcg, Each Nostril, Daily    loratadine (CLARITIN) 10 mg, Oral, Daily    norgestimate-ethinyl estradioL (ORTHO-CYCLEN) 0.25-35 mg-mcg per tablet 1 tablet, Oral, Daily    spironolactone (ALDACTONE) 50 mg, Oral, Daily        Review of patient's allergies indicates:   Allergen Reactions    Vancomycin analogues Itching and Swelling     Swollen lips   Swollen lips          Objective:     Visit Vitals  /89 (BP Location: Left arm, Patient Position: Sitting)   Pulse 79   Temp 98.2 °F (36.8 °C) (Oral)   Resp 18   Ht 5' 4" (1.626 m)   Wt 68.1 kg (150 lb 3.2 oz)   LMP 10/01/2023 (Exact Date)   SpO2 99%   BMI 25.78 kg/m²       Physical Examination:     Physical Exam  Constitutional:       General: She is not in acute distress.     Appearance: Normal appearance. She is not ill-appearing.   Cardiovascular:      " Rate and Rhythm: Normal rate and regular rhythm.      Heart sounds: Normal heart sounds.   Pulmonary:      Effort: Pulmonary effort is normal. No respiratory distress.      Breath sounds: Normal breath sounds.   Musculoskeletal:      Cervical back: Normal range of motion.   Skin:     General: Skin is warm and dry.   Neurological:      Mental Status: She is alert and oriented to person, place, and time.   Psychiatric:         Mood and Affect: Mood normal.         Behavior: Behavior normal.         Lab Results:     Chemistry:  Lab Results   Component Value Date     06/20/2023    K 3.5 06/20/2023    CHLORIDE 108 (H) 06/20/2023    BUN 9.3 06/20/2023    CREATININE 0.78 06/20/2023    EGFRNORACEVR >60 06/20/2023    GLUCOSE 116 (H) 06/20/2023    CALCIUM 9.2 06/20/2023    ALKPHOS 51 06/20/2023    LABPROT 6.8 06/20/2023    ALBUMIN 3.2 (L) 06/20/2023    BILIDIR 0.2 09/12/2021    IBILI 0.20 09/12/2021    AST 12 06/20/2023    ALT 9 06/20/2023    MG 1.70 06/20/2023    RXTJDIBM10OZ 9.6 (L) 06/05/2023    TSH 0.621 06/05/2023    JSPVAE4SXKB 1.18 06/05/2023        Lab Results   Component Value Date    HGBA1C 4.9 06/05/2023        Hematology:  Lab Results   Component Value Date    WBC 6.83 06/20/2023    HGB 11.7 (L) 06/20/2023    HCT 34.8 (L) 06/20/2023     06/20/2023       Lipid Panel:  Lab Results   Component Value Date    CHOL 170 06/05/2023    HDL 46 06/05/2023    .00 06/05/2023    TRIG 61 06/05/2023    TOTALCHOLEST 4 06/05/2023        Urine:  Lab Results   Component Value Date    COLORUA Straw 06/20/2023    APPEARANCEUA Clear 06/20/2023    SGUA 1.015 06/20/2023    PHUA 6.5 06/20/2023    PROTEINUA Negative 06/20/2023    GLUCOSEUA Negative 06/20/2023    KETONESUA Negative 06/20/2023    BLOODUA Negative 06/20/2023    NITRITESUA Negative 06/20/2023    LEUKOCYTESUR Negative 06/20/2023    RBCUA <5 09/16/2022    WBCUA <5 09/16/2022    BACTERIA None Seen 09/16/2022    SQEPUA Occ (A) 07/20/2022    HYALINECASTS None  Seen 07/20/2022    CREATRANDUR 62.0 07/20/2021    PROTEINURINE 8 07/23/2021        Assessment:          ICD-10-CM ICD-9-CM   1. Influenza vaccination ordered  Z78.9 V49.89   2. Prediabetes  R73.03 790.29   3. Iron deficiency anemia due to chronic blood loss  D50.0 280.0   4. Essential hypertension  I10 401.9   5. PCOS (polycystic ovarian syndrome)  E28.2 256.4        Plan:     1. Influenza vaccination ordered  -     influenza (QUADRIVALENT PF) vaccine 0.5 mL    2. Prediabetes  Overview:  The prediabetic range is 5.7 - 6.4. They will need to follow an ADA diet, avoiding soda, simple sweets, and limit rice/pasta/breads/starches.  Maintain healthy weight with a goal BMI less than 30.  Exercise 5 times per week for 30 minutes/day.  We will continue to monitor this lab and will recheck it in the future      Assessment & Plan:  Stable and controlled, continue dietary modifications.  Patient is no longer in prediabetic range.    Orders:  -     POCT HEMOGLOBIN A1C    3. Iron deficiency anemia due to chronic blood loss  Assessment & Plan:  Anemia panel today    Orders:  -     Iron and TIBC  -     Cancel: Iron  -     Vitamin B12  -     Ferritin  -     CBC Auto Differential  -     Vitamin D    4. Essential hypertension  Overview:  Low Sodium Diet (Dash Diet - less than 2 grams of sodium per day).  Monitor Blood Pressure daily and log. Report any consistent numbers greater than 140/90.  Smoking Cessation encouraged to aid in BP reduction.  Maintain healthy weight with goal BMI <30. Exercise 30 minutes per day 5 days per week      Assessment & Plan:  Stable and well controlled. Continue current medication. Limit salt in diet. Monitor BP and notify clinic for consistently elevated BP >140/90.      Orders:  -     spironolactone (ALDACTONE) 50 MG tablet; Take 1 tablet (50 mg total) by mouth once daily.  Dispense: 90 tablet; Refill: 3    5. PCOS (polycystic ovarian syndrome)  Assessment & Plan:  Patient noticing improvement in  facial hair growth, increase spironolactone to 50 mg daily.           Follow up in about 6 months (around 4/2/2024) for routine labs recheck.    Future Appointments   Date Time Provider Department Center   4/2/2024  7:00 AM Meghan Fried NP [facility]        Meghan Fried NP

## 2023-10-10 ENCOUNTER — PATIENT MESSAGE (OUTPATIENT)
Dept: FAMILY MEDICINE | Facility: CLINIC | Age: 28
End: 2023-10-10
Payer: MEDICAID

## 2023-10-10 DIAGNOSIS — E55.9 VITAMIN D DEFICIENCY: Primary | ICD-10-CM

## 2023-10-10 RX ORDER — ERGOCALCIFEROL 1.25 MG/1
50000 CAPSULE ORAL
Qty: 12 CAPSULE | Refills: 0 | Status: SHIPPED | OUTPATIENT
Start: 2023-10-10 | End: 2023-12-27

## 2024-02-24 ENCOUNTER — HOSPITAL ENCOUNTER (EMERGENCY)
Facility: HOSPITAL | Age: 29
Discharge: HOME | End: 2024-02-24
Attending: STUDENT IN AN ORGANIZED HEALTH CARE EDUCATION/TRAINING PROGRAM
Payer: MEDICAID

## 2024-02-24 VITALS
DIASTOLIC BLOOD PRESSURE: 84 MMHG | BODY MASS INDEX: 26.78 KG/M2 | WEIGHT: 156 LBS | TEMPERATURE: 98 F | SYSTOLIC BLOOD PRESSURE: 150 MMHG | RESPIRATION RATE: 18 BRPM | HEART RATE: 73 BPM | OXYGEN SATURATION: 99 %

## 2024-02-24 DIAGNOSIS — M25.519 SHOULDER PAIN: ICD-10-CM

## 2024-02-24 LAB
ALBUMIN SERPL-MCNC: 3.5 G/DL (ref 3.5–5)
ALBUMIN/GLOB SERPL: 1 RATIO (ref 1.1–2)
ALP SERPL-CCNC: 49 UNIT/L (ref 40–150)
ALT SERPL-CCNC: 9 UNIT/L (ref 0–55)
APPEARANCE UR: CLEAR
AST SERPL-CCNC: 14 UNIT/L (ref 5–34)
B-HCG SERPL QL: NEGATIVE
BACTERIA #/AREA URNS AUTO: ABNORMAL /HPF
BASOPHILS # BLD AUTO: 0.05 X10(3)/MCL
BASOPHILS NFR BLD AUTO: 0.9 %
BILIRUB SERPL-MCNC: 0.6 MG/DL
BILIRUB UR QL STRIP.AUTO: NEGATIVE
BUN SERPL-MCNC: 11.4 MG/DL (ref 7–18.7)
CALCIUM SERPL-MCNC: 9.4 MG/DL (ref 8.4–10.2)
CHLORIDE SERPL-SCNC: 105 MMOL/L (ref 98–107)
CO2 SERPL-SCNC: 27 MMOL/L (ref 22–29)
COLOR UR AUTO: ABNORMAL
CREAT SERPL-MCNC: 0.83 MG/DL (ref 0.55–1.02)
EOSINOPHIL # BLD AUTO: 0.15 X10(3)/MCL (ref 0–0.9)
EOSINOPHIL NFR BLD AUTO: 2.8 %
ERYTHROCYTE [DISTWIDTH] IN BLOOD BY AUTOMATED COUNT: 13.6 % (ref 11.5–17)
GFR SERPLBLD CREATININE-BSD FMLA CKD-EPI: >60 MLS/MIN/1.73/M2
GLOBULIN SER-MCNC: 3.6 GM/DL (ref 2.4–3.5)
GLUCOSE SERPL-MCNC: 84 MG/DL (ref 74–100)
GLUCOSE UR QL STRIP.AUTO: NORMAL
HCT VFR BLD AUTO: 38.8 % (ref 37–47)
HGB BLD-MCNC: 12.9 G/DL (ref 12–16)
IMM GRANULOCYTES # BLD AUTO: 0.01 X10(3)/MCL (ref 0–0.04)
IMM GRANULOCYTES NFR BLD AUTO: 0.2 %
KETONES UR QL STRIP.AUTO: NEGATIVE
LEUKOCYTE ESTERASE UR QL STRIP.AUTO: NEGATIVE
LYMPHOCYTES # BLD AUTO: 1.59 X10(3)/MCL (ref 0.6–4.6)
LYMPHOCYTES NFR BLD AUTO: 29.8 %
MAGNESIUM SERPL-MCNC: 1.7 MG/DL (ref 1.6–2.6)
MCH RBC QN AUTO: 29.6 PG (ref 27–31)
MCHC RBC AUTO-ENTMCNC: 33.2 G/DL (ref 33–36)
MCV RBC AUTO: 89 FL (ref 80–94)
MONOCYTES # BLD AUTO: 0.62 X10(3)/MCL (ref 0.1–1.3)
MONOCYTES NFR BLD AUTO: 11.6 %
MUCOUS THREADS URNS QL MICRO: ABNORMAL /LPF
NEUTROPHILS # BLD AUTO: 2.91 X10(3)/MCL (ref 2.1–9.2)
NEUTROPHILS NFR BLD AUTO: 54.7 %
NITRITE UR QL STRIP.AUTO: NEGATIVE
NRBC BLD AUTO-RTO: 0 %
PH UR STRIP.AUTO: 7 [PH]
PLATELET # BLD AUTO: 282 X10(3)/MCL (ref 130–400)
PMV BLD AUTO: 11.3 FL (ref 7.4–10.4)
POTASSIUM SERPL-SCNC: 4.3 MMOL/L (ref 3.5–5.1)
PROT SERPL-MCNC: 7.1 GM/DL (ref 6.4–8.3)
PROT UR QL STRIP.AUTO: NEGATIVE
RBC # BLD AUTO: 4.36 X10(6)/MCL (ref 4.2–5.4)
RBC #/AREA URNS AUTO: ABNORMAL /HPF
RBC UR QL AUTO: NEGATIVE
SODIUM SERPL-SCNC: 137 MMOL/L (ref 136–145)
SP GR UR STRIP.AUTO: 1.02 (ref 1–1.03)
SQUAMOUS #/AREA URNS LPF: ABNORMAL /HPF
TROPONIN I SERPL-MCNC: <0.01 NG/ML (ref 0–0.04)
UROBILINOGEN UR STRIP-ACNC: NORMAL
WBC # SPEC AUTO: 5.33 X10(3)/MCL (ref 4.5–11.5)
WBC #/AREA URNS AUTO: ABNORMAL /HPF

## 2024-02-24 PROCEDURE — 93005 ELECTROCARDIOGRAM TRACING: CPT

## 2024-02-24 PROCEDURE — 99285 EMERGENCY DEPT VISIT HI MDM: CPT | Mod: 25

## 2024-02-24 PROCEDURE — 81025 URINE PREGNANCY TEST: CPT | Performed by: STUDENT IN AN ORGANIZED HEALTH CARE EDUCATION/TRAINING PROGRAM

## 2024-02-24 PROCEDURE — 85025 COMPLETE CBC W/AUTO DIFF WBC: CPT | Performed by: STUDENT IN AN ORGANIZED HEALTH CARE EDUCATION/TRAINING PROGRAM

## 2024-02-24 PROCEDURE — 93010 ELECTROCARDIOGRAM REPORT: CPT | Mod: ,,, | Performed by: INTERNAL MEDICINE

## 2024-02-24 PROCEDURE — 80053 COMPREHEN METABOLIC PANEL: CPT | Performed by: STUDENT IN AN ORGANIZED HEALTH CARE EDUCATION/TRAINING PROGRAM

## 2024-02-24 PROCEDURE — 25000003 PHARM REV CODE 250: Performed by: STUDENT IN AN ORGANIZED HEALTH CARE EDUCATION/TRAINING PROGRAM

## 2024-02-24 PROCEDURE — 83735 ASSAY OF MAGNESIUM: CPT | Performed by: STUDENT IN AN ORGANIZED HEALTH CARE EDUCATION/TRAINING PROGRAM

## 2024-02-24 PROCEDURE — 84484 ASSAY OF TROPONIN QUANT: CPT | Performed by: STUDENT IN AN ORGANIZED HEALTH CARE EDUCATION/TRAINING PROGRAM

## 2024-02-24 PROCEDURE — 81001 URINALYSIS AUTO W/SCOPE: CPT | Performed by: STUDENT IN AN ORGANIZED HEALTH CARE EDUCATION/TRAINING PROGRAM

## 2024-02-24 RX ORDER — METHOCARBAMOL 500 MG/1
500 TABLET, FILM COATED ORAL
Status: DISCONTINUED | OUTPATIENT
Start: 2024-02-24 | End: 2024-02-24 | Stop reason: HOSPADM

## 2024-02-24 RX ORDER — IBUPROFEN 600 MG/1
600 TABLET ORAL EVERY 6 HOURS PRN
Qty: 20 TABLET | Refills: 0 | Status: SHIPPED | OUTPATIENT
Start: 2024-02-24

## 2024-02-24 RX ORDER — METHOCARBAMOL 500 MG/1
1000 TABLET, FILM COATED ORAL 3 TIMES DAILY
Qty: 30 TABLET | Refills: 0 | Status: SHIPPED | OUTPATIENT
Start: 2024-02-24 | End: 2024-02-29

## 2024-02-24 RX ORDER — IBUPROFEN 600 MG/1
600 TABLET ORAL
Status: COMPLETED | OUTPATIENT
Start: 2024-02-24 | End: 2024-02-24

## 2024-02-24 RX ADMIN — IBUPROFEN 600 MG: 600 TABLET, FILM COATED ORAL at 11:02

## 2024-02-24 NOTE — DISCHARGE INSTRUCTIONS

## 2024-02-24 NOTE — ED PROVIDER NOTES
Encounter Date: 2024    SCRIBE #1 NOTE: I, Denisse Vi, am scribing for, and in the presence of,  Boo Zhou MD. I have scribed the following portions of the note - Other sections scribed: HPI, ROS, PE.       History     Chief Complaint   Patient presents with    Arm Pain     Pt c/o L shoulder pain radiating down L wrist since this AM. Pt denies trauma/falling/lifting heavy. Reports lower back pain last night, resolved now. Denies history neck/back problems. Denies numbness/tingling     28 year old female with a history of anemia, anxiety, and hypertension presents to ED for left shoulder pain radiating down left wrist since earlier this morning. Pt denies numbness/tingling, trauma, recent injuries, or heavy lifting. States her pain has improved since. Notes her father  last month from MI.    The history is provided by the patient. No  was used.     Review of patient's allergies indicates:   Allergen Reactions    Vancomycin analogues Itching and Swelling     Swollen lips   Swollen lips        Past Medical History:   Diagnosis Date    Anemia     Anxiety     Hypertension      Past Surgical History:   Procedure Laterality Date     SECTION      LAPAROSCOPIC REPAIR OF UMBILICAL HERNIA N/A 2022    Procedure: REPAIR, HERNIA, UMBILICAL, LAPAROSCOPIC;  Surgeon: Keshia Gu MD;  Location: [facility];  Service: General;  Laterality: N/A;     Family History   Problem Relation Age of Onset    Hypertension Father     Diabetes Father     Hypertension Mother      Social History     Tobacco Use    Smoking status: Never    Smokeless tobacco: Never   Substance Use Topics    Alcohol use: No    Drug use: No     Review of Systems   Constitutional:  Negative for chills and fever.   HENT:  Negative for congestion, drooling and sore throat.    Eyes:  Negative for pain and visual disturbance.   Respiratory:  Negative for chest tightness, shortness of breath and wheezing.    Cardiovascular:   Negative for chest pain, palpitations and leg swelling.   Gastrointestinal:  Negative for abdominal pain, nausea and vomiting.   Genitourinary:  Negative for dysuria and hematuria.   Musculoskeletal:  Positive for myalgias (Left shoulder radiating down wrist). Negative for neck pain and neck stiffness.   Skin:  Negative for pallor and rash.   Neurological:  Negative for weakness and numbness.   Hematological:  Does not bruise/bleed easily.       Physical Exam     Initial Vitals [02/24/24 0850]   BP Pulse Resp Temp SpO2   (!) 150/84 73 18 98.4 °F (36.9 °C) 99 %      MAP       --         Physical Exam    Nursing note and vitals reviewed.  Constitutional: She appears well-developed and well-nourished. She is not diaphoretic. No distress.   HENT:   Head: Normocephalic and atraumatic.   Nose: Nose normal.   Mouth/Throat: Oropharynx is clear and moist.   Eyes: EOM are normal. Pupils are equal, round, and reactive to light.   Neck: Neck supple.   Normal range of motion.  Cardiovascular:  Normal rate and regular rhythm.           No murmur heard.  Pulmonary/Chest: Breath sounds normal. No respiratory distress. She has no wheezes. She has no rales.   Abdominal: Abdomen is soft. She exhibits no distension. There is no abdominal tenderness.   Musculoskeletal:      Cervical back: Normal range of motion and neck supple.     Neurological: She is alert and oriented to person, place, and time. She has normal strength. No cranial nerve deficit or sensory deficit.   Skin: Skin is warm. Capillary refill takes less than 2 seconds. No rash noted.         ED Course   Procedures  Labs Reviewed   COMPREHENSIVE METABOLIC PANEL - Abnormal; Notable for the following components:       Result Value    Globulin 3.6 (*)     Albumin/Globulin Ratio 1.0 (*)     All other components within normal limits   CBC WITH DIFFERENTIAL - Abnormal; Notable for the following components:    MPV 11.3 (*)     All other components within normal limits   URINALYSIS,  REFLEX TO URINE CULTURE - Abnormal; Notable for the following components:    Mucous, UA Trace (*)     All other components within normal limits   PREGNANCY TEST, URINE RAPID - Normal   TROPONIN I - Normal   MAGNESIUM - Normal   CBC W/ AUTO DIFFERENTIAL    Narrative:     The following orders were created for panel order CBC auto differential.  Procedure                               Abnormality         Status                     ---------                               -----------         ------                     CBC with Differential[[id number]]        Abnormal            Final result                 Please view results for these tests on the individual orders.        ECG Results              EKG 12-lead (Final result)        Collection Time Result Time QRS Duration OHS QTC Calculation    02/24/24 09:26:18 02/25/24 09:44:40 82 409                     Final result by Interface, Lab In [facility] (02/25/24 09:44:42)                   Narrative:    Test Reason : M25.519,    Vent. Rate : 072 BPM     Atrial Rate : 072 BPM     P-R Int : 162 ms          QRS Dur : 082 ms      QT Int : 374 ms       P-R-T Axes : 058 082 042 degrees     QTc Int : 409 ms    Normal sinus rhythm  Normal ECG  When compared with ECG of 20-DEC-2017 21:45,  No significant change was found  Confirmed by Adeel Gillette MD ([id number]) on 2/25/2024 9:44:37 AM    Referred By:             Confirmed By:Adeel Gillette MD                                     EKG 12-LEAD (Final result)  Result time 02/28/24 15:35:28      Final result by Unknown User (02/28/24 15:35:28)                                      Imaging Results              X-Ray Shoulder 2 or More Views Left (Final result)  Result time 02/24/24 09:27:14      Final result by Apoorva Ordonez MD (02/24/24 09:27:14)                   Impression:      No acute osseous abnormality.      Electronically signed by: Apoorva Ordonez  Date:    02/24/2024  Time:    09:27               Narrative:    EXAMINATION:  XR  SHOULDER COMPLETE 2 OR MORE VIEWS LEFT    CLINICAL HISTORY:  pain;    TECHNIQUE:  Three views of the left shoulder were performed.    COMPARISON  None    FINDINGS:  BONES: No fracture. No dislocation.    SOFT TISSUES:  Regional soft tissues are normal.                                       X-Ray Chest AP Portable (Final result)  Result time 02/24/24 09:24:39      Final result by Niranjan Lovett MD (02/24/24 09:24:39)                   Impression:      No acute cardiopulmonary process.      Electronically signed by: Niranjan Lovett  Date:    02/24/2024  Time:    09:24               Narrative:    EXAMINATION:  XR CHEST AP PORTABLE    CLINICAL HISTORY:  Pain in unspecified shoulder    TECHNIQUE:  Single view of the chest    COMPARISON:  11/16/2022    FINDINGS:  No focal opacification, pleural effusion, or pneumothorax.    The cardiomediastinal silhouette is within normal limits.    No acute osseous abnormality.                                       Medications   ibuprofen tablet 600 mg (600 mg Oral Given 2/24/24 1106)     Medical Decision Making  Problems Addressed:  Shoulder pain: acute illness or injury    Amount and/or Complexity of Data Reviewed  Labs: ordered. Decision-making details documented in ED Course.  Radiology: ordered and independent interpretation performed. Decision-making details documented in ED Course.    Risk  Prescription drug management.    Differential diagnosis (includes but is not limited to):   Musculoskeletal pain, neuropathy, ACS, atypical ACS, dehydration, kidney injury, electrolyte abnormalities    MDM Narrative  28-year-old female presents for evaluation of left-sided chest wall and left shoulder pain that she reported started last night.  She reports that her father recently passed from an MI and she was concerned about coronary disease.  EKG reviewed.  Chest and shoulder x-ray pending.  Labs are pending.  Pain and nausea control as needed.    Update:  Labs reviewed.  Troponin  negative.  X-rays unremarkable.  Patient reports symptom improvement with medications given in the emergency department.  Patient states she is ready for discharge home.  Strict return precautions discussed and she has verbalized understanding.  Patient is to follow up with her primary care physician for further evaluation of her symptoms.    Dispo: Discharge    My independent radiology interpretation: as above  Point of care US (independently performed and interpreted):   Decision rules/clinical scoring:     Sepsis Perfusion Assessment:     Amount and/or Complexity of Data Reviewed  Independent historian: none   Summary of history:   External data reviewed: notes from previous ED visits and notes from clinic visits  Summary of data reviewed: Prior records reviewed  Risk and benefits of testing: discussed   Labs: ordered and reviewed  Radiology: ordered and independent interpretation performed (see above or ED course)  ECG/medicine tests: ordered and independent interpretation performed (see above or ED course)  Discussion of management or test interpretation with external provider(s): none   Summary of discussion:     Risk  OTC medications  Prescription drug management   Shared decision making     Critical Care  none    Data Reviewed/Counseling: I have personally reviewed the patient's vital signs, nursing notes, and other relevant tests, information, and imaging. I had a detailed discussion regarding the historical points, exam findings, and any diagnostic results supporting the discharge diagnosis. I personally performed the history, PE, MDM and procedures as documented above and agree with the scribe's documentation.    Portions of this note were dictated using voice recognition software. Although it was reviewed for accuracy, some inherent voice recognition errors may have occurred and may be present in this document.          Scribe Attestation:   Scribe #1: I performed the above scribed service and the  documentation accurately describes the services I performed. I attest to the accuracy of the note.    Attending Attestation:           Physician Attestation for Scribe:  Physician Attestation Statement for Scribe #1: I, Boo Zhou MD, reviewed documentation, as scribed by Denisse Bello in my presence, and it is both accurate and complete.             ED Course as of 03/15/24 0808   Sat 2024   0938 EKG independently interpreted by me.  EKG: NSR @ 72, no STEMI, Qtc 409 []   0938 X-Ray Chest AP Portable  Independently visualized/reviewed by me during the ED visit.  - No acute lobar consolidation, no PTX []   0938 X-Ray Shoulder 2 or More Views Left  Independently visualized/reviewed by me during the ED visit.  - No acute fracture or dislocation []   1114 I have reassessed the patient.  Patient is resting comfortably, no acute distress.  Vital signs stable.  Discussed all results including incidental findings.  Discussed need for follow up and discussed return precautions.  Answered all questions at this time.  Hemodynamically stable for continued outpatient management. Patient verbalized understanding and agreed to plan.    [MC]      ED Course User Index  [MC] Boo Zhou MD                             Clinical Impression:  Final diagnoses:  [M25.519] Shoulder pain          ED Disposition Condition    Discharge Stable          ED Prescriptions       Medication Sig Dispense Start Date End Date Auth. Provider    methocarbamoL (ROBAXIN) 500 MG Tab () Take 2 tablets (1,000 mg total) by mouth 3 (three) times daily. for 5 days 30 tablet 2024 Boo Zhou MD    ibuprofen (ADVIL,MOTRIN) 600 MG tablet Take 1 tablet (600 mg total) by mouth every 6 (six) hours as needed for Pain. 20 tablet 2024 -- Boo Zhou MD          Follow-up Information       Follow up With Specialties Details Why Contact Info    Meghan Fried NP Family Medicine Schedule  an appointment as soon as possible for a visit   [address] [facility] [address]  [phone]      [facility] - Emergency Dept Emergency Medicine  If symptoms worsen [address] [facility] [id number] [phone]             Boo Zhou MD  03/15/24 0830

## 2024-02-25 LAB
OHS QRS DURATION: 82 MS
OHS QTC CALCULATION: 409 MS

## 2024-04-06 ENCOUNTER — PATIENT MESSAGE (OUTPATIENT)
Dept: FAMILY MEDICINE | Facility: CLINIC | Age: 29
End: 2024-04-06
Payer: MEDICAID

## 2024-05-01 ENCOUNTER — HOSPITAL ENCOUNTER (EMERGENCY)
Facility: HOSPITAL | Age: 29
Discharge: HOME | End: 2024-05-01
Attending: STUDENT IN AN ORGANIZED HEALTH CARE EDUCATION/TRAINING PROGRAM
Payer: COMMERCIAL

## 2024-05-01 VITALS
DIASTOLIC BLOOD PRESSURE: 95 MMHG | HEART RATE: 68 BPM | RESPIRATION RATE: 18 BRPM | SYSTOLIC BLOOD PRESSURE: 155 MMHG | OXYGEN SATURATION: 100 % | BODY MASS INDEX: 28.17 KG/M2 | TEMPERATURE: 99 F | WEIGHT: 165 LBS | HEIGHT: 64 IN
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
## 2024-05-01 DIAGNOSIS — M54.50 ACUTE LEFT-SIDED LOW BACK PAIN WITHOUT SCIATICA: ICD-10-CM

## 2024-05-01 LAB — B-HCG SERPL QL: NEGATIVE

## 2024-05-01 PROCEDURE — 96372 THER/PROPH/DIAG INJ SC/IM: CPT | Performed by: NURSE PRACTITIONER

## 2024-05-01 PROCEDURE — 99284 EMERGENCY DEPT VISIT MOD MDM: CPT | Mod: 25

## 2024-05-01 PROCEDURE — 63600175 PHARM REV CODE 636 W HCPCS: Performed by: NURSE PRACTITIONER

## 2024-05-01 PROCEDURE — 81025 URINE PREGNANCY TEST: CPT | Performed by: NURSE PRACTITIONER

## 2024-05-01 RX ORDER — KETOROLAC TROMETHAMINE 10 MG/1
10 TABLET, FILM COATED ORAL EVERY 6 HOURS PRN
Qty: 20 TABLET | Refills: 0 | Status: SHIPPED | OUTPATIENT
Start: 2024-05-01 | End: 2024-05-06

## 2024-05-01 RX ORDER — KETOROLAC TROMETHAMINE 30 MG/ML
60 INJECTION, SOLUTION INTRAMUSCULAR; INTRAVENOUS
Status: COMPLETED | OUTPATIENT
Start: 2024-05-01 | End: 2024-05-01

## 2024-05-01 RX ADMIN — KETOROLAC TROMETHAMINE 60 MG: 30 INJECTION, SOLUTION INTRAMUSCULAR at 06:05
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
## 2024-05-02 NOTE — ED NOTES
Lobby Round. Pt sitting in main lobby alert and oriented at this time. Vitals assessed. Pt states that toradol injection did help with discomfort but there is still some pain present.pt denies any other complaint different from initial triage at this time. Pt does not appear to be in any immediate distress at this time.

## 2024-07-04 ENCOUNTER — PATIENT MESSAGE (OUTPATIENT)
Dept: FAMILY MEDICINE | Facility: CLINIC | Age: 29
End: 2024-07-04
Payer: MEDICAID

## 2024-07-19 ENCOUNTER — OFFICE VISIT (OUTPATIENT)
Dept: FAMILY MEDICINE | Facility: CLINIC | Age: 29
End: 2024-07-19
Payer: MEDICAID

## 2024-07-19 VITALS
SYSTOLIC BLOOD PRESSURE: 114 MMHG | HEART RATE: 89 BPM | TEMPERATURE: 98 F | BODY MASS INDEX: 26.09 KG/M2 | OXYGEN SATURATION: 98 % | RESPIRATION RATE: 20 BRPM | HEIGHT: 64 IN | WEIGHT: 152.81 LBS | DIASTOLIC BLOOD PRESSURE: 78 MMHG

## 2024-07-19 DIAGNOSIS — F32.0 CURRENT MILD EPISODE OF MAJOR DEPRESSIVE DISORDER WITHOUT PRIOR EPISODE: Primary | ICD-10-CM

## 2024-07-19 PROCEDURE — 3008F BODY MASS INDEX DOCD: CPT | Mod: CPTII,,,

## 2024-07-19 PROCEDURE — 99214 OFFICE O/P EST MOD 30 MIN: CPT | Mod: PBBFAC,PN

## 2024-07-19 PROCEDURE — 3078F DIAST BP <80 MM HG: CPT | Mod: CPTII,,,

## 2024-07-19 PROCEDURE — 1160F RVW MEDS BY RX/DR IN RCRD: CPT | Mod: CPTII,,,

## 2024-07-19 PROCEDURE — 99214 OFFICE O/P EST MOD 30 MIN: CPT | Mod: S$PBB,,,

## 2024-07-19 PROCEDURE — 3074F SYST BP LT 130 MM HG: CPT | Mod: CPTII,,,

## 2024-07-19 PROCEDURE — 1159F MED LIST DOCD IN RCRD: CPT | Mod: CPTII,,,

## 2024-07-19 RX ORDER — FLUOXETINE 10 MG/1
CAPSULE ORAL
Qty: 84 CAPSULE | Refills: 0 | Status: SHIPPED | OUTPATIENT
Start: 2024-07-19 | End: 2024-09-06

## 2024-07-19 NOTE — PROGRESS NOTES
Patient Name: Meghna Dozier     : 1995    MRN: [id number]     Subjective:     Patient ID: Meghna Dozier is a 28 y.o. female.    Chief Complaint:   Chief Complaint   Patient presents with    Follow-up     F/u appointment. C/o swelling to left leg. States recently lost her father and has moods of depression.         HPI: 2024: Patient complains of depression. She complains of depressed mood and fatigue. Onset was approximately several months ago. Symptoms have been unchanged since that time. Current symptoms include: anhedonia, depressed mood, difficulty concentrating, and fatigue. Patient denies hopelessness, suicidal attempt, and SI/HI. Father recently passed away from MI. Risk factors: negative life event with death of father. Previous treatment includes individual therapy, unable to participate due to cost and medication, doesn't remember name. Patient denies chest pain, palpitations, and shortness of breath.  Patient denies fever, night sweats, chills, nausea, vomiting, diarrhea, constipation, weight loss, and changes in appetite.        ROS:      12 point review of systems conducted, negative except as stated in the history of present illness. See HPI for details.    History:     Past Medical History:   Diagnosis Date    Anemia     Anxiety     Hypertension         Past Surgical History:   Procedure Laterality Date     SECTION      LAPAROSCOPIC REPAIR OF UMBILICAL HERNIA N/A 2022    Procedure: REPAIR, HERNIA, UMBILICAL, LAPAROSCOPIC;  Surgeon: Keshia Gu MD;  Location: [facility];  Service: General;  Laterality: N/A;       Family History   Problem Relation Name Age of Onset    Hypertension Father      Diabetes Father      Hypertension Mother          Social History     Tobacco Use    Smoking status: Never    Smokeless tobacco: Never   Substance and Sexual Activity    Alcohol use: No    Drug use: No    Sexual activity: Yes     Partners: Male       Current Outpatient Medications  "  Medication Instructions    albuterol (PROVENTIL HFA) 90 mcg/actuation inhaler 2 puffs, Inhalation, Every 6 hours PRN, Rescue    cyclobenzaprine (FLEXERIL) 10 mg, Every 8 hours PRN    ergocalciferol (ERGOCALCIFEROL) 50,000 Units, Oral, Every 7 days    ferrous sulfate 325 mg, Oral, 3 times daily with meals    FLUoxetine 10 MG capsule Take 1 capsule (10 mg total) by mouth once daily for 14 days, THEN 2 capsules (20 mg total) once daily.    fluticasone propionate (FLONASE) 50 mcg, Each Nostril, Daily    ibuprofen (ADVIL,MOTRIN) 600 mg, Oral, Every 6 hours PRN    loratadine (CLARITIN) 10 mg, Oral, Daily    norgestimate-ethinyl estradioL (ORTHO-CYCLEN) 0.25-35 mg-mcg per tablet 1 tablet, Oral, Daily    spironolactone (ALDACTONE) 50 mg, Oral, Daily        Review of patient's allergies indicates:   Allergen Reactions    Vancomycin analogues Itching and Swelling     Swollen lips   Swollen lips          Objective:     Visit Vitals  /78 (BP Location: Left arm, Patient Position: Sitting)   Pulse 89   Temp 98.2 °F (36.8 °C) (Oral)   Resp 20   Ht 5' 4" (1.626 m)   Wt 69.3 kg (152 lb 12.8 oz)   LMP 07/05/2024   SpO2 98%   BMI 26.23 kg/m²       Physical Examination:     Physical Exam  Constitutional:       General: She is not in acute distress.     Appearance: Normal appearance. She is not ill-appearing.   Cardiovascular:      Rate and Rhythm: Normal rate and regular rhythm.      Heart sounds: Normal heart sounds.   Pulmonary:      Effort: Pulmonary effort is normal. No respiratory distress.      Breath sounds: Normal breath sounds.   Musculoskeletal:      Cervical back: Normal range of motion.   Skin:     General: Skin is warm and dry.   Neurological:      Mental Status: She is alert and oriented to person, place, and time.   Psychiatric:         Mood and Affect: Mood normal.         Behavior: Behavior normal.           Assessment:          ICD-10-CM ICD-9-CM   1. Current mild episode of major depressive disorder without " prior episode  F32.0 296.21        Plan:     1. Current mild episode of major depressive disorder without prior episode  Assessment & Plan:  Start Prozac 10 mg for 2 weeks increase to 20 mg daily.  Read positive daily meditations, avoid negative media, set healthy boundaries.  Exercise daily, keep consistent sleep pattern, eat a healthy diet.  Establish good social support, make changes to reduce stress.  Reports any symptoms of suicidal/homicidal ideations or self harm immediately, if clinic is closed go to nearest emergency room.      Return to clinic in 6 weeks for virtual visit to assess efficacy of medication    Orders:  -     FLUoxetine 10 MG capsule; Take 1 capsule (10 mg total) by mouth once daily for 14 days, THEN 2 capsules (20 mg total) once daily.  Dispense: 84 capsule; Refill: 0         Follow up in about 6 weeks (around 8/30/2024), or if symptoms worsen or fail to improve.    Future Appointments   Date Time Provider Department Center   8/20/2024 12:45 PM Meghan Fried NP [facility]        Meghan Fried NP      This note was created with the assistance of a voice recognition software or phone dictation. There may be transcription errors as a result of using this technology however minimal. Effort has been made to assure accuracy of transcription but any obvious errors or omissions should be clarified with the author of the document

## 2024-07-19 NOTE — ASSESSMENT & PLAN NOTE
Start Prozac 10 mg for 2 weeks increase to 20 mg daily.  Read positive daily meditations, avoid negative media, set healthy boundaries.  Exercise daily, keep consistent sleep pattern, eat a healthy diet.  Establish good social support, make changes to reduce stress.  Reports any symptoms of suicidal/homicidal ideations or self harm immediately, if clinic is closed go to nearest emergency room.      Return to clinic in 6 weeks for virtual visit to assess efficacy of medication

## 2024-07-28 ENCOUNTER — HOSPITAL ENCOUNTER (EMERGENCY)
Facility: HOSPITAL | Age: 29
Discharge: HOME | End: 2024-07-28
Attending: STUDENT IN AN ORGANIZED HEALTH CARE EDUCATION/TRAINING PROGRAM
Payer: MEDICAID

## 2024-07-28 VITALS
SYSTOLIC BLOOD PRESSURE: 157 MMHG | OXYGEN SATURATION: 100 % | RESPIRATION RATE: 20 BRPM | TEMPERATURE: 99 F | HEIGHT: 64 IN | BODY MASS INDEX: 26.63 KG/M2 | WEIGHT: 156 LBS | HEART RATE: 86 BPM | DIASTOLIC BLOOD PRESSURE: 90 MMHG

## 2024-07-28 DIAGNOSIS — F43.21 GRIEF REACTION: Primary | ICD-10-CM

## 2024-07-28 DIAGNOSIS — G47.00 INSOMNIA, UNSPECIFIED TYPE: ICD-10-CM

## 2024-07-28 PROCEDURE — 25000003 PHARM REV CODE 250: Mod: ER | Performed by: PHYSICIAN ASSISTANT

## 2024-07-28 PROCEDURE — 99283 EMERGENCY DEPT VISIT LOW MDM: CPT | Mod: ER

## 2024-07-28 RX ORDER — HYDROXYZINE PAMOATE 25 MG/1
25 CAPSULE ORAL
Status: COMPLETED | OUTPATIENT
Start: 2024-07-28 | End: 2024-07-28

## 2024-07-28 RX ORDER — HYDROXYZINE HYDROCHLORIDE 25 MG/1
25 TABLET, FILM COATED ORAL NIGHTLY
Qty: 14 TABLET | Refills: 0 | Status: SHIPPED | OUTPATIENT
Start: 2024-07-28 | End: 2024-08-11

## 2024-07-28 RX ADMIN — HYDROXYZINE PAMOATE 25 MG: 25 CAPSULE ORAL at 07:07

## 2024-07-28 NOTE — FIRST PROVIDER EVALUATION
Emergency Department TeleTriage Encounter Note      CHIEF COMPLAINT    Chief Complaint   Patient presents with    Insomnia     Pt reports difficulty sleeping since mom passed away on Friday. Denies any SI.        VITAL SIGNS   Initial Vitals [07/28/24 1709]   BP Pulse Resp Temp SpO2   (!) 157/90 86 20 98.6 °F (37 °C) 100 %      MAP       --            ALLERGIES    Review of patient's allergies indicates:   Allergen Reactions    Vancomycin analogues Itching and Swelling     Swollen lips   Swollen lips          PROVIDER TRIAGE NOTE  Patient presents with grief and insomnia since mother passed Friday. No SI/HI      ORDERS  Labs Reviewed - No data to display    ED Orders (720h ago, onward)      None              Virtual Visit Note: The provider triage portion of this emergency department evaluation and documentation was performed via [employer], a HIPAA-compliant telemedicine application, in concert with a tele-presenter in the room. A face to face patient evaluation with one of my colleagues will occur once the patient is placed in an emergency department room.      DISCLAIMER: This note was prepared with [employer] voice recognition transcription software. Garbled syntax, mangled pronouns, and other bizarre constructions may be attributed to that software system.

## 2024-07-29 NOTE — ED PROVIDER NOTES
Encounter Date: 2024       History     Chief Complaint   Patient presents with    Insomnia     Pt reports difficulty sleeping since mom passed away on Friday. Denies any SI.      Patient is a 28-year-old female who presents to ER with complaints of difficulty sleeping since her mother passed away suddenly on Friday.  States that she has not been able to sleep much.  No OTC treatments.  Reports she occasionally does feel like she is getting mild anxiety attack.  She presents to ER with her sister accompanying her.  Has a history of anxiety, not currently on any treatments.         Review of patient's allergies indicates:   Allergen Reactions    Vancomycin analogues Itching and Swelling     Swollen lips   Swollen lips        Past Medical History:   Diagnosis Date    Anemia     Anxiety     Hypertension      Past Surgical History:   Procedure Laterality Date     SECTION      LAPAROSCOPIC REPAIR OF UMBILICAL HERNIA N/A 2022    Procedure: REPAIR, HERNIA, UMBILICAL, LAPAROSCOPIC;  Surgeon: Keshia Gu MD;  Location: [facility];  Service: General;  Laterality: N/A;     Family History   Problem Relation Name Age of Onset    Hypertension Father      Diabetes Father      Hypertension Mother       Social History     Tobacco Use    Smoking status: Never    Smokeless tobacco: Never   Substance Use Topics    Alcohol use: No    Drug use: No     Review of Systems   Constitutional:  Negative for fever.   Respiratory:  Negative for shortness of breath.    Cardiovascular:  Negative for chest pain.   Gastrointestinal:  Negative for nausea.   Genitourinary:  Negative for dysuria.   Musculoskeletal:  Negative for back pain.   Skin:  Negative for rash.   Neurological:  Negative for weakness.   Psychiatric/Behavioral:  Positive for sleep disturbance. Negative for agitation, behavioral problems, confusion, decreased concentration, dysphoric mood, hallucinations, self-injury and suicidal ideas. The patient is  nervous/anxious. The patient is not hyperactive.        Physical Exam     Initial Vitals [07/28/24 1709]   BP Pulse Resp Temp SpO2   (!) 157/90 86 20 98.6 °F (37 °C) 100 %      MAP       --         Physical Exam    Nursing note and vitals reviewed.  Constitutional: She appears well-developed and well-nourished. She is not diaphoretic. No distress.   HENT:   Head: Normocephalic and atraumatic.   Eyes: Conjunctivae and EOM are normal. Pupils are equal, round, and reactive to light.   Neck: Neck supple.   Normal range of motion.  Cardiovascular:  Normal rate, regular rhythm, normal heart sounds and intact distal pulses.           Pulmonary/Chest: Breath sounds normal. No respiratory distress.   Abdominal: Abdomen is soft. Bowel sounds are normal. There is no abdominal tenderness.   Musculoskeletal:         General: No tenderness or edema. Normal range of motion.      Cervical back: Normal range of motion and neck supple.     Neurological: She is alert and oriented to person, place, and time. She has normal strength. GCS score is 15. GCS eye subscore is 4. GCS verbal subscore is 5. GCS motor subscore is 6.   Skin: Skin is warm. Capillary refill takes less than 2 seconds. No rash noted.   Psychiatric: She has a normal mood and affect. Her behavior is normal. Judgment and thought content normal.         ED Course   Procedures  Labs Reviewed - No data to display       Imaging Results    None          Medications   hydrOXYzine pamoate capsule 25 mg (25 mg Oral Given 7/28/24 1913)     Medical Decision Making  Problems Addressed:  Insomnia, unspecified type:     Details: Insomnia in the setting of acute grief reaction. Patient is stable, VSS, non-toxic appearing. Will give dose of vistaril in ER and discharge with the same to take prn for anxiety/bedtime for sleep. F/u with PCP - outpatient counseling.   ED precautions discussed.   Patient voiced understanding and agreement with plan of care. All questions were answered.      Risk  Prescription drug management.                                      Clinical Impression:  Final diagnoses:  [F43.21] Grief reaction (Primary)  [G47.00] Insomnia, unspecified type          ED Disposition Condition    Discharge Stable          ED Prescriptions       Medication Sig Dispense Start Date End Date Auth. Provider    hydrOXYzine HCL (ATARAX) 25 MG tablet Take 1 tablet (25 mg total) by mouth every evening. for 14 days 14 tablet 7/28/2024 8/11/2024 Betty Juarez PA-C          Follow-up Information       Follow up With Specialties Details Why Contact Info    Meghan Fried NP Family Medicine   [address] [place] [id number]  [phone]               Betty Juarez PA-C  07/28/24 3894

## 2024-08-26 DIAGNOSIS — R05.3 CHRONIC COUGH: ICD-10-CM

## 2024-08-26 RX ORDER — ALBUTEROL SULFATE 90 UG/1
2 INHALANT RESPIRATORY (INHALATION) EVERY 6 HOURS PRN
Qty: 18 G | Refills: 1 | Status: SHIPPED | OUTPATIENT
Start: 2024-08-26

## 2024-09-10 ENCOUNTER — OFFICE VISIT (OUTPATIENT)
Dept: FAMILY MEDICINE | Facility: CLINIC | Age: 29
End: 2024-09-10
Payer: MEDICAID

## 2024-09-10 ENCOUNTER — LAB VISIT (OUTPATIENT)
Dept: LAB | Facility: HOSPITAL | Age: 29
End: 2024-09-10
Attending: STUDENT IN AN ORGANIZED HEALTH CARE EDUCATION/TRAINING PROGRAM
Payer: MEDICAID

## 2024-09-10 ENCOUNTER — OFFICE VISIT (OUTPATIENT)
Dept: OBSTETRICS AND GYNECOLOGY | Facility: CLINIC | Age: 29
End: 2024-09-10
Payer: MEDICAID

## 2024-09-10 VITALS
WEIGHT: 160.25 LBS | HEART RATE: 83 BPM | SYSTOLIC BLOOD PRESSURE: 118 MMHG | BODY MASS INDEX: 27.36 KG/M2 | HEIGHT: 64 IN | DIASTOLIC BLOOD PRESSURE: 88 MMHG | OXYGEN SATURATION: 99 %

## 2024-09-10 VITALS
BODY MASS INDEX: 27.47 KG/M2 | WEIGHT: 160.06 LBS | DIASTOLIC BLOOD PRESSURE: 90 MMHG | SYSTOLIC BLOOD PRESSURE: 145 MMHG

## 2024-09-10 DIAGNOSIS — I10 PRIMARY HYPERTENSION: Primary | ICD-10-CM

## 2024-09-10 DIAGNOSIS — J45.20 MILD INTERMITTENT ASTHMA WITHOUT COMPLICATION: ICD-10-CM

## 2024-09-10 DIAGNOSIS — Z01.419 WELL WOMAN EXAM: Primary | ICD-10-CM

## 2024-09-10 DIAGNOSIS — R73.03 PREDIABETES: ICD-10-CM

## 2024-09-10 DIAGNOSIS — J30.9 ALLERGIC RHINITIS, UNSPECIFIED SEASONALITY, UNSPECIFIED TRIGGER: ICD-10-CM

## 2024-09-10 DIAGNOSIS — E55.9 VITAMIN D DEFICIENCY: ICD-10-CM

## 2024-09-10 DIAGNOSIS — Z01.419 WELL WOMAN EXAM: ICD-10-CM

## 2024-09-10 DIAGNOSIS — F32.1 CURRENT MODERATE EPISODE OF MAJOR DEPRESSIVE DISORDER WITHOUT PRIOR EPISODE: ICD-10-CM

## 2024-09-10 DIAGNOSIS — Z23 NEEDS FLU SHOT: ICD-10-CM

## 2024-09-10 DIAGNOSIS — D50.0 IRON DEFICIENCY ANEMIA DUE TO CHRONIC BLOOD LOSS: ICD-10-CM

## 2024-09-10 PROBLEM — E28.2 PCOS (POLYCYSTIC OVARIAN SYNDROME): Chronic | Status: RESOLVED | Noted: 2018-04-11 | Resolved: 2024-09-10

## 2024-09-10 PROBLEM — K42.9 UMBILICAL HERNIA: Status: RESOLVED | Noted: 2022-07-06 | Resolved: 2024-09-10

## 2024-09-10 PROBLEM — E78.5 HYPERLIPIDEMIA: Status: ACTIVE | Noted: 2020-12-02

## 2024-09-10 PROCEDURE — 3008F BODY MASS INDEX DOCD: CPT | Mod: CPTII,,, | Performed by: STUDENT IN AN ORGANIZED HEALTH CARE EDUCATION/TRAINING PROGRAM

## 2024-09-10 PROCEDURE — 99999 PR PBB SHADOW E&M-EST. PATIENT-LVL III: CPT | Mod: PBBFAC,,, | Performed by: FAMILY MEDICINE

## 2024-09-10 PROCEDURE — 3074F SYST BP LT 130 MM HG: CPT | Mod: CPTII,,, | Performed by: FAMILY MEDICINE

## 2024-09-10 PROCEDURE — 86593 SYPHILIS TEST NON-TREP QUANT: CPT | Performed by: STUDENT IN AN ORGANIZED HEALTH CARE EDUCATION/TRAINING PROGRAM

## 2024-09-10 PROCEDURE — 87591 N.GONORRHOEAE DNA AMP PROB: CPT | Performed by: STUDENT IN AN ORGANIZED HEALTH CARE EDUCATION/TRAINING PROGRAM

## 2024-09-10 PROCEDURE — 3079F DIAST BP 80-89 MM HG: CPT | Mod: CPTII,,, | Performed by: FAMILY MEDICINE

## 2024-09-10 PROCEDURE — 1160F RVW MEDS BY RX/DR IN RCRD: CPT | Mod: CPTII,,, | Performed by: FAMILY MEDICINE

## 2024-09-10 PROCEDURE — 99999 PR PBB SHADOW E&M-EST. PATIENT-LVL III: CPT | Mod: PBBFAC,,, | Performed by: STUDENT IN AN ORGANIZED HEALTH CARE EDUCATION/TRAINING PROGRAM

## 2024-09-10 PROCEDURE — 80074 ACUTE HEPATITIS PANEL: CPT | Performed by: STUDENT IN AN ORGANIZED HEALTH CARE EDUCATION/TRAINING PROGRAM

## 2024-09-10 PROCEDURE — 36415 COLL VENOUS BLD VENIPUNCTURE: CPT | Performed by: STUDENT IN AN ORGANIZED HEALTH CARE EDUCATION/TRAINING PROGRAM

## 2024-09-10 PROCEDURE — 1159F MED LIST DOCD IN RCRD: CPT | Mod: CPTII,,, | Performed by: STUDENT IN AN ORGANIZED HEALTH CARE EDUCATION/TRAINING PROGRAM

## 2024-09-10 PROCEDURE — 99213 OFFICE O/P EST LOW 20 MIN: CPT | Mod: PBBFAC,PO | Performed by: STUDENT IN AN ORGANIZED HEALTH CARE EDUCATION/TRAINING PROGRAM

## 2024-09-10 PROCEDURE — 87389 HIV-1 AG W/HIV-1&-2 AB AG IA: CPT | Performed by: STUDENT IN AN ORGANIZED HEALTH CARE EDUCATION/TRAINING PROGRAM

## 2024-09-10 PROCEDURE — 81514 NFCT DS BV&VAGINITIS DNA ALG: CPT | Performed by: STUDENT IN AN ORGANIZED HEALTH CARE EDUCATION/TRAINING PROGRAM

## 2024-09-10 PROCEDURE — 3077F SYST BP >= 140 MM HG: CPT | Mod: CPTII,,, | Performed by: STUDENT IN AN ORGANIZED HEALTH CARE EDUCATION/TRAINING PROGRAM

## 2024-09-10 PROCEDURE — 99213 OFFICE O/P EST LOW 20 MIN: CPT | Mod: PBBFAC,27,PO | Performed by: FAMILY MEDICINE

## 2024-09-10 PROCEDURE — 99385 PREV VISIT NEW AGE 18-39: CPT | Mod: S$PBB,,, | Performed by: STUDENT IN AN ORGANIZED HEALTH CARE EDUCATION/TRAINING PROGRAM

## 2024-09-10 PROCEDURE — 3008F BODY MASS INDEX DOCD: CPT | Mod: CPTII,,, | Performed by: FAMILY MEDICINE

## 2024-09-10 PROCEDURE — 99214 OFFICE O/P EST MOD 30 MIN: CPT | Mod: S$PBB,,, | Performed by: FAMILY MEDICINE

## 2024-09-10 PROCEDURE — 87491 CHLMYD TRACH DNA AMP PROBE: CPT | Performed by: STUDENT IN AN ORGANIZED HEALTH CARE EDUCATION/TRAINING PROGRAM

## 2024-09-10 PROCEDURE — 3080F DIAST BP >= 90 MM HG: CPT | Mod: CPTII,,, | Performed by: STUDENT IN AN ORGANIZED HEALTH CARE EDUCATION/TRAINING PROGRAM

## 2024-09-10 PROCEDURE — 1159F MED LIST DOCD IN RCRD: CPT | Mod: CPTII,,, | Performed by: FAMILY MEDICINE

## 2024-09-10 PROCEDURE — 1160F RVW MEDS BY RX/DR IN RCRD: CPT | Mod: CPTII,,, | Performed by: STUDENT IN AN ORGANIZED HEALTH CARE EDUCATION/TRAINING PROGRAM

## 2024-09-10 RX ORDER — AMLODIPINE BESYLATE 10 MG/1
10 TABLET ORAL DAILY
Qty: 90 TABLET | Refills: 3 | Status: SHIPPED | OUTPATIENT
Start: 2024-09-10 | End: 2025-09-10

## 2024-09-10 RX ORDER — ACETAMINOPHEN 500 MG
2000 TABLET ORAL DAILY
Start: 2024-09-10

## 2024-09-10 RX ORDER — CETIRIZINE HYDROCHLORIDE 10 MG/1
10 TABLET ORAL DAILY
Qty: 90 TABLET | Refills: 3 | Status: SHIPPED | OUTPATIENT
Start: 2024-09-10

## 2024-09-10 RX ORDER — FLUTICASONE PROPIONATE 50 MCG
2 SPRAY, SUSPENSION (ML) NASAL DAILY
Qty: 18.2 ML | Refills: 11 | Status: SHIPPED | OUTPATIENT
Start: 2024-09-10

## 2024-09-10 RX ORDER — FERROUS SULFATE 325(65) MG
325 TABLET, DELAYED RELEASE (ENTERIC COATED) ORAL DAILY
Qty: 90 TABLET | Refills: 3 | Status: SHIPPED | OUTPATIENT
Start: 2024-09-10

## 2024-09-10 RX ORDER — ALBUTEROL SULFATE 90 UG/1
2 INHALANT RESPIRATORY (INHALATION) EVERY 4 HOURS PRN
Qty: 18 G | Refills: 3 | Status: SHIPPED | OUTPATIENT
Start: 2024-09-10

## 2024-09-10 RX ORDER — FLUOXETINE 10 MG/1
CAPSULE ORAL
Qty: 84 CAPSULE | Refills: 0 | Status: CANCELLED | OUTPATIENT
Start: 2024-09-10 | End: 2024-10-28

## 2024-09-10 RX ORDER — FLUOXETINE 10 MG/1
10 CAPSULE ORAL DAILY
Qty: 30 CAPSULE | Refills: 1 | Status: SHIPPED | OUTPATIENT
Start: 2024-09-10 | End: 2025-09-10

## 2024-09-10 NOTE — PROGRESS NOTES
Subjective     Patient ID: Meghna Dozier is a 28 y.o. female.    Chief Complaint: Establish Care    Pt is here to establish care.    Her Mom  2024 of MI at 56y.o. and her father  recently in 10/2024.  She would like to start a med for MDD.  Review of Systems   Constitutional:  Negative for appetite change, chills, fatigue, fever and unexpected weight change.   HENT:  Positive for nasal congestion, postnasal drip and rhinorrhea. Negative for ear pain, trouble swallowing and voice change.    Eyes:  Negative for pain.   Respiratory:  Negative for cough, shortness of breath and wheezing.    Cardiovascular:  Negative for chest pain and palpitations.   Gastrointestinal:  Negative for abdominal pain, blood in stool, constipation, diarrhea, nausea and vomiting.   Endocrine: Negative for cold intolerance and heat intolerance.   Genitourinary:  Negative for difficulty urinating, dysuria and hematuria.   Musculoskeletal:  Negative for joint swelling, neck stiffness and joint deformity.   Integumentary:  Negative for color change and rash.   Neurological:  Negative for seizures, speech difficulty, weakness, headaches and coordination difficulties.   Psychiatric/Behavioral:  Positive for depressed mood. Negative for confusion and sleep disturbance. The patient is nervous/anxious.         Objective     Physical Exam  Vitals and nursing note reviewed.   Constitutional:       General: She is not in acute distress.     Appearance: Normal appearance. She is not ill-appearing.   HENT:      Head: Normocephalic and atraumatic.      Right Ear: Tympanic membrane, ear canal and external ear normal.      Left Ear: Tympanic membrane, ear canal and external ear normal.      Nose: Nose normal.   Eyes:      General: No scleral icterus.     Extraocular Movements: Extraocular movements intact.      Conjunctiva/sclera: Conjunctivae normal.      Pupils: Pupils are equal, round, and reactive to light.   Cardiovascular:      Rate and  Rhythm: Normal rate and regular rhythm.      Pulses: Normal pulses.      Heart sounds: Normal heart sounds.      No gallop.   Pulmonary:      Effort: Pulmonary effort is normal. No respiratory distress.      Breath sounds: Normal breath sounds. No wheezing or rales.   Abdominal:      General: Bowel sounds are normal. There is no distension.      Palpations: Abdomen is soft. There is no mass.      Tenderness: There is no abdominal tenderness.   Musculoskeletal:         General: No swelling, tenderness or deformity. Normal range of motion.      Cervical back: Normal range of motion and neck supple. No rigidity or tenderness.   Skin:     General: Skin is warm and dry.      Coloration: Skin is not jaundiced.      Findings: No rash.   Neurological:      General: No focal deficit present.      Mental Status: She is alert and oriented to person, place, and time.      Cranial Nerves: No cranial nerve deficit.   Psychiatric:         Mood and Affect: Mood normal.         Behavior: Behavior normal.         Thought Content: Thought content normal.         Judgment: Judgment normal.        Assessment and Plan     1. Primary hypertension  Overview:  Low Sodium Diet (Dash Diet - less than 2 grams of sodium per day).  Monitor Blood Pressure daily and log. Report any consistent numbers greater than 140/90.  Maintain healthy weight with goal BMI <30. Exercise 30 minutes per day 5 days per week      Orders:  -     amLODIPine (NORVASC) 10 MG tablet; Take 1 tablet (10 mg total) by mouth once daily.  Dispense: 90 tablet; Refill: 3  -     CBC Auto Differential; Future; Expected date: 09/10/2024  -     Comprehensive Metabolic Panel; Future; Expected date: 09/10/2024  -     Lipid Panel; Future; Expected date: 09/10/2024  -     TSH; Future; Expected date: 09/10/2024  -     Urinalysis, Reflex to Urine Culture Urine, Clean Catch; Future; Expected date: 03/10/2025  -     Microalbumin/Creatinine Ratio, Urine; Future; Expected date:  09/10/2024    2. Vitamin D deficiency  -     cholecalciferol, vitamin D3, (VITAMIN D3) 50 mcg (2,000 unit) Cap capsule; Take 1 capsule (2,000 Units total) by mouth once daily.    3. Mild intermittent asthma without complication  - Teaching on Asthma including avoiding triggers, treatment options and warning symptoms.  Continue Albuterol.  -     albuterol (PROVENTIL HFA) 90 mcg/actuation inhaler; Inhale 2 puffs into the lungs every 4 (four) hours as needed for Wheezing or Shortness of Breath. Rescue  Dispense: 18 g; Refill: 3    4. Allergic rhinitis, unspecified seasonality, unspecified trigger  - Teaching on AR including avoiding triggers and treatment options.  Continue Zyrtec and Flonase.  Will start Singular and/or saline sinus rinses if needed.   -     fluticasone propionate (FLONASE) 50 mcg/actuation nasal spray; 2 sprays (100 mcg total) by Each Nostril route once daily.  Dispense: 18.2 mL; Refill: 11  -     cetirizine (ZYRTEC) 10 MG tablet; Take 1 tablet (10 mg total) by mouth once daily.  Dispense: 90 tablet; Refill: 3    5. Current mild episode of major depressive disorder without prior episode  - Reviewed MDD including coping skills, safety precautions and treatment options.  Start Prozac and titrate dose.  -     FLUoxetine 10 MG capsule; Take 1 capsule (10 mg total) by mouth once daily.  Dispense: 30 capsule; Refill: 1    6. Iron deficiency anemia due to chronic blood loss  -     ferrous sulfate 325 (65 FE) MG EC tablet; Take 1 tablet (325 mg total) by mouth once daily.  Dispense: 90 tablet; Refill: 3  -     CBC Auto Differential; Future; Expected date: 09/10/2024    8. Needs flu shot  -     influenza (Egg-FREE) (Flucelvax) 45 mcg/0.5 mL IM vaccine (> or = 6 mo) (*contains preservatives) 0.5 mL    Follow up in 1 month for MDD

## 2024-09-10 NOTE — PROGRESS NOTES
History & Physical  Gynecology      SUBJECTIVE:     Chief Complaint: Annual Exam       History of Present Illness:  28 y.o. female  here for annual GYN visit.   She describes her periods as regular, lasting 3 days with normal flow.   She denies vaginal itching, irritation, or discharge.  Patient is sexually active with 1 male partner.  She uses no method for contraception and is OK if she gets pregnant  She does not use tobacco, alcohol or drugs.  She does not work, lives with her brother and child and reports feeling safe at home.     Patient denies a history of abnormal paps  Last Pap:     She denies a family history of breast or ovarian cancer.       Review of patient's allergies indicates:   Allergen Reactions    Vancomycin analogues Itching and Swelling     Swollen lips   Swollen lips          Past Medical History:   Diagnosis Date    Anemia     Anxiety     Hypertension      Past Surgical History:   Procedure Laterality Date     SECTION      LAPAROSCOPIC REPAIR OF UMBILICAL HERNIA N/A 2022    Procedure: REPAIR, HERNIA, UMBILICAL, LAPAROSCOPIC;  Surgeon: Keshia Gu MD;  Location: [facility];  Service: General;  Laterality: N/A;     OB History          1    Para   0    Term   0       0    AB   0    Living   1         SAB   0    IAB   0    Ectopic   0    Multiple   0    Live Births   0               Family History   Problem Relation Name Age of Onset    Hypertension Father      Diabetes Father      Hypertension Mother       Social History     Tobacco Use    Smoking status: Never    Smokeless tobacco: Never   Substance Use Topics    Alcohol use: No    Drug use: No       Current Outpatient Medications   Medication Sig    albuterol (PROVENTIL HFA) 90 mcg/actuation inhaler Inhale 2 puffs into the lungs every 6 (six) hours as needed for Wheezing. Rescue    cyclobenzaprine (FLEXERIL) 10 MG tablet Take 10 mg by mouth every 8 (eight) hours as needed.    ergocalciferol  (ERGOCALCIFEROL) 50,000 unit Cap Take 1 capsule (50,000 Units total) by mouth every 7 days.    ferrous sulfate 325 (65 FE) MG EC tablet Take 1 tablet (325 mg total) by mouth 3 (three) times daily with meals.    fluticasone propionate (FLONASE) 50 mcg/actuation nasal spray 1 spray (50 mcg total) by Each Nostril route once daily.    ibuprofen (ADVIL,MOTRIN) 600 MG tablet Take 1 tablet (600 mg total) by mouth every 6 (six) hours as needed for Pain.    spironolactone (ALDACTONE) 50 MG tablet Take 1 tablet (50 mg total) by mouth once daily.    FLUoxetine 10 MG capsule Take 1 capsule (10 mg total) by mouth once daily for 14 days, THEN 2 capsules (20 mg total) once daily.    loratadine (CLARITIN) 10 mg tablet Take 1 tablet (10 mg total) by mouth once daily.    norgestimate-ethinyl estradioL (ORTHO-CYCLEN) 0.25-35 mg-mcg per tablet Take 1 tablet by mouth once daily. (Patient not taking: Reported on 6/5/2023)     No current facility-administered medications for this visit.     Facility-Administered Medications Ordered in Other Visits   Medication    LIDOcaine (PF) 10 mg/ml (1%) injection 10 mg       Review of Systems:  Review of Systems   Constitutional:  Negative for chills, fatigue and fever.   HENT:  Negative for congestion.    Eyes:  Negative for visual disturbance.   Respiratory:  Negative for cough and shortness of breath.    Cardiovascular:  Negative for chest pain and palpitations.   Gastrointestinal:  Negative for abdominal distention, abdominal pain, constipation, diarrhea, nausea and vomiting.   Genitourinary:  Negative for difficulty urinating, dysuria, hematuria, vaginal bleeding and vaginal discharge.   Skin:  Negative for rash.   Neurological:  Negative for dizziness, seizures, light-headedness and headaches.   Hematological:  Does not bruise/bleed easily.   Psychiatric/Behavioral:  Negative for dysphoric mood. The patient is not nervous/anxious.         OBJECTIVE:     Physical Exam:  Vitals:    09/10/24 1126    BP: (!) 145/90      Physical Exam  Vitals and nursing note reviewed. Exam conducted with a chaperone present.   Constitutional:       General: She is not in acute distress.     Appearance: She is well-developed.   HENT:      Head: Normocephalic and atraumatic.   Eyes:      Pupils: Pupils are equal, round, and reactive to light.   Cardiovascular:      Rate and Rhythm: Normal rate and regular rhythm.   Pulmonary:      Effort: Pulmonary effort is normal. No respiratory distress.   Chest:   Breasts:     Right: Normal.      Left: Normal.   Abdominal:      General: There is no distension.      Palpations: Abdomen is soft. There is no mass.      Tenderness: There is no abdominal tenderness. There is no guarding.   Genitourinary:     Comments: SSE: Normal external female genitalia, normal urethral meatus, normal vaginal rugae, normal vaginal mucosa, no vaginal blood in canal, normal physiologic discharge, normal cervix, no adnexal masses palpated on bimanual exam.     Musculoskeletal:         General: Normal range of motion.      Cervical back: Normal range of motion and neck supple.   Skin:     General: Skin is warm and dry.   Neurological:      Mental Status: She is alert and oriented to person, place, and time.   Psychiatric:         Behavior: Behavior normal.         Thought Content: Thought content normal.         Judgment: Judgment normal.         ASSESSMENT/PLAN:     1. Well woman exam  - Liquid-Based Pap Smear, Screening  - HPV High Risk Genotypes, PCR  - C. trachomatis/N. gonorrhoeae by AMP DNA  - Vaginosis Screen by DNA Probe  - Hepatitis Panel, Acute; Future  - HIV 1/2 Ag/Ab (4th Gen); Future  - Treponema Pallidium Antibodies IgG, IgM; Future    Yoan Medina M.D.  OB/GYN  [facility]

## 2024-09-11 LAB
HAV IGM SERPL QL IA: NORMAL
HBV CORE IGM SERPL QL IA: NORMAL
HBV SURFACE AG SERPL QL IA: NORMAL
HCV AB SERPL QL IA: NORMAL
HIV 1+2 AB+HIV1 P24 AG SERPL QL IA: NORMAL
TREPONEMA PALLIDUM IGG+IGM AB [PRESENCE] IN SERUM OR PLASMA BY IMMUNOASSAY: NONREACTIVE

## 2024-09-12 LAB
BACTERIAL VAGINOSIS DNA: NEGATIVE
CANDIDA GLABRATA DNA: NEGATIVE
CANDIDA KRUSEI DNA: NEGATIVE
CANDIDA RRNA VAG QL PROBE: NEGATIVE
T VAGINALIS RRNA GENITAL QL PROBE: NEGATIVE

## 2024-09-13 LAB
C TRACH DNA SPEC QL NAA+PROBE: NOT DETECTED
N GONORRHOEA DNA SPEC QL NAA+PROBE: NOT DETECTED

## 2024-09-15 ENCOUNTER — HOSPITAL ENCOUNTER (EMERGENCY)
Facility: HOSPITAL | Age: 29
Discharge: HOME | End: 2024-09-16
Attending: EMERGENCY MEDICINE
Payer: MEDICAID

## 2024-09-15 DIAGNOSIS — R42 VERTIGO: Primary | ICD-10-CM

## 2024-09-15 DIAGNOSIS — F41.0 ANXIETY ATTACK: ICD-10-CM

## 2024-09-15 LAB
B-HCG UR QL: NEGATIVE
CTP QC/QA: YES

## 2024-09-15 PROCEDURE — 81025 URINE PREGNANCY TEST: CPT | Mod: ER

## 2024-09-15 PROCEDURE — 25000003 PHARM REV CODE 250: Mod: ER | Performed by: EMERGENCY MEDICINE

## 2024-09-15 PROCEDURE — 99284 EMERGENCY DEPT VISIT MOD MDM: CPT | Mod: 25,ER

## 2024-09-15 RX ORDER — ALPRAZOLAM 0.5 MG/1
0.25 TABLET ORAL 3 TIMES DAILY PRN
Qty: 21 TABLET | Refills: 0 | Status: SHIPPED | OUTPATIENT
Start: 2024-09-15 | End: 2024-09-29

## 2024-09-15 RX ORDER — MECLIZINE HYDROCHLORIDE 25 MG/1
25 TABLET ORAL
Status: COMPLETED | OUTPATIENT
Start: 2024-09-15 | End: 2024-09-15

## 2024-09-15 RX ORDER — MECLIZINE HYDROCHLORIDE 25 MG/1
25 TABLET ORAL 3 TIMES DAILY PRN
Qty: 30 TABLET | Refills: 0 | Status: SHIPPED | OUTPATIENT
Start: 2024-09-15 | End: 2024-10-15

## 2024-09-15 RX ORDER — ALPRAZOLAM 0.25 MG/1
1 TABLET ORAL
Status: COMPLETED | OUTPATIENT
Start: 2024-09-15 | End: 2024-09-15

## 2024-09-15 RX ADMIN — MECLIZINE HYDROCHLORIDE 25 MG: 25 TABLET ORAL at 11:09

## 2024-09-15 RX ADMIN — ALPRAZOLAM 1 MG: 0.25 TABLET ORAL at 11:09

## 2024-09-16 VITALS
OXYGEN SATURATION: 98 % | WEIGHT: 160 LBS | DIASTOLIC BLOOD PRESSURE: 82 MMHG | RESPIRATION RATE: 16 BRPM | BODY MASS INDEX: 27.31 KG/M2 | HEART RATE: 70 BPM | SYSTOLIC BLOOD PRESSURE: 143 MMHG | HEIGHT: 64 IN | TEMPERATURE: 98 F

## 2024-09-16 NOTE — ED PROVIDER NOTES
"Encounter Date: 9/15/2024    SCRIBE #1 NOTE: I, Chidi Stallworth Do, am scribing for, and in the presence of,  Stevo Dozier MD. I have scribed the following portions of the note - Other sections scribed: HPI, ROS, PE.       History     Chief Complaint   Patient presents with    Dizziness     PT REPORTS BOUT OF DIZZINESS AT 1600 WITH NAUSEA TODAY, REPORTS HX OF VERTIGO,DENIES DIZZINESS AT PRESENT TIME     28 year old female with multiple medical problems including anxiety, MDD, asthma, anemia, and others, presents to the ED for evaluation following two episodes of dizziness that occurred 2.5 hr PTA while she was driving.  She describes the dizziness episodes as a "room spinning" sensation.  She reports pulling over to the side of the road until the first episode resolved.  She reports second episode occurred before she reached her destination and resolved spontaneously after a few minutes. She reports associated nausea during both episodes and one episode of emesis during the second episode.  She reports subsequently feeling anxious and describes having a panic attack  that lasted until she arrived at her destination where her significant other helped her to calm down.  She reports having similar episode of dizziness and nausea in the past when she was diagnosed with vertigo.  She indicates that the remote episode of vertigo was much more severe than today's episode since the symptoms did not resolve spontaneously as they did today.  Dizziness and panic attack are currently resolved but patient endorses feeling exceptionally anxious and overwhelmed today.  She endorses chronic decreased appetite, depression and intermittent anxiety over the last several months since her father  11 months ago and her mother  two months ago.  She also endorses chronic intermittent sleeping difficulty which is alleviated by taking Hydroxyzine which was prescribed two months ago during an ED encounter for grief reaction.  She reports " being seen by her PCP five days ago and was prescribed Fluoxetine for depression and anxiety but has not picked up the prescription from the pharmacy yet.  Ryan OLEA.  LMP was 08/15/24, menstrual period late.  She endorses normal urination and bowel movements. Patient notes adherence with her anti-hypertensive medication. Patient denies any SOB, CP, vision disturbance, focal weakness, gait disturbance or syncope. Patient denies tobacco use, alcohol use or drug use.      The history is provided by the patient. No  was used.     Review of patient's allergies indicates:   Allergen Reactions    Vancomycin analogues Itching and Swelling     Swollen lips   Swollen lips        Past Medical History:   Diagnosis Date    Anemia     Anxiety     Hypertension     Umbilical hernia 2022    Hernia since  post delivery   Retractable, causing more pain lately        Past Surgical History:   Procedure Laterality Date     SECTION      LAPAROSCOPIC REPAIR OF UMBILICAL HERNIA N/A 2022    Procedure: REPAIR, HERNIA, UMBILICAL, LAPAROSCOPIC;  Surgeon: Keshia Gu MD;  Location: [facility];  Service: General;  Laterality: N/A;     Family History   Problem Relation Name Age of Onset    Hypertension Father      Diabetes Father      Hypertension Mother       Social History     Tobacco Use    Smoking status: Never    Smokeless tobacco: Never   Substance Use Topics    Alcohol use: No    Drug use: No     Review of Systems   Constitutional:  Positive for appetite change.   Eyes:  Negative for visual disturbance.   Respiratory:  Negative for shortness of breath.    Cardiovascular:  Positive for palpitations. Negative for chest pain.   Gastrointestinal:  Positive for vomiting. Negative for nausea.   Neurological:  Positive for dizziness. Negative for weakness.   Psychiatric/Behavioral:  Positive for dysphoric mood and sleep disturbance. Negative for suicidal ideas. The patient is nervous/anxious.    All  other systems reviewed and are negative.      Physical Exam     Initial Vitals [09/15/24 2231]   BP Pulse Resp Temp SpO2   (!) 137/97 68 18 97.9 °F (36.6 °C) 99 %      MAP       --         Physical Exam    Nursing note and vitals reviewed.  Constitutional: She appears well-developed and well-nourished. She is not diaphoretic. No distress.   HENT:   Head: Normocephalic and atraumatic.   Right Ear: External ear normal.   Left Ear: External ear normal.   Nose: Nose normal.   Eyes: Conjunctivae, EOM and lids are normal. Pupils are equal, round, and reactive to light.   Neck: Trachea normal and phonation normal. Neck supple. No stridor present.   Normal range of motion.  Cardiovascular:  Normal rate, regular rhythm and normal heart sounds.           No murmur heard.  Pulmonary/Chest: Breath sounds normal. No accessory muscle usage or stridor. No tachypnea. No respiratory distress.   Musculoskeletal:         General: Normal range of motion.      Cervical back: Normal range of motion and neck supple.     Neurological: She is alert and oriented to person, place, and time. She has normal strength. No cranial nerve deficit or sensory deficit. Coordination and gait normal. GCS eye subscore is 4. GCS verbal subscore is 5. GCS motor subscore is 6.   No direction changing nystagmus on eccentric gaze.   Head impulse test is reassuring  No skew deviation   Skin: Skin is warm and dry.   Psychiatric: Her speech is normal and behavior is normal. Thought content normal. Her affect is blunt. She is not actively hallucinating. Thought content is not paranoid and not delusional. She exhibits a depressed mood. She expresses no suicidal ideation. She is attentive.         ED Course   Procedures  Labs Reviewed   POCT URINE PREGNANCY       Result Value    POC Preg Test, Ur Negative       Acceptable Yes            Imaging Results    None          Medications   ALPRAZolam tablet 1 mg (1 mg Oral Given 9/15/24 2325)   meclizine  tablet 25 mg (25 mg Oral Given 9/15/24 2324)     Medical Decision Making  Amount and/or Complexity of Data Reviewed  Labs: ordered. Decision-making details documented in ED Course.    Risk  Prescription drug management.    Labs Reviewed    Admission on 09/15/2024, Discharged on 09/16/2024   Component Date Value Ref Range Status    POC Preg Test, Ur 09/15/2024 Negative  Negative Final     Acceptable 09/15/2024 Yes   Final        Imaging Reviewed    Imaging Results    None         Medications given in ED    Medications   ALPRAZolam tablet 1 mg (1 mg Oral Given 9/15/24 2325)   meclizine tablet 25 mg (25 mg Oral Given 9/15/24 2324)         Note was created using voice recognition software. Note may have occasional typographical errors that may not have been identified and edited despite good ivania initial review prior to signing.    I, Stevo Dozier MD, personally performed the services described in this documentation. All medical record entries made by the scribe were at my direction and in my presence.  I have reviewed the chart and agree that the record reflects my personal performance and is accurate and complete.            Scribe Attestation:   Scribe #1: I performed the above scribed service and the documentation accurately describes the services I performed. I attest to the accuracy of the note.        ED Course as of 09/16/24 0250   Sun Sep 15, 2024   2336 DDx:Anxiety, vertigo, toxicity, symptomatic anemia, dysrhythmia, hypoglycemia, thyroid disease, hypovolemia, renal failure, liver failure, sepsis, others   [DL]   2345 Patient states she is scheduled to have blood work done tomorrow morning so declined ED work up today since dizziness/vertigo symptoms are resolved.  [DL]      ED Course User Index  [DL] Stevo Dozier MD                 Medical Decision Making:   ED Management:  Symptoms cw with vertigo that resolved prior to arrival. Patient also endorses recent depression and anxiety related  to the death of both of her parents within this past year.  Symptomatic treatment for anxiety and prophylactic treatment for vertigo provided in ED. Outpatient management plan, outpatient PCP follow up and ED return precautions discussed with patient with understanding and agreement.                Clinical Impression:  Final diagnoses:  [F41.0] Anxiety attack  [R42] Vertigo (Primary)          ED Disposition Condition    Discharge Stable          ED Prescriptions       Medication Sig Dispense Start Date End Date Auth. Provider    meclizine (ANTIVERT) 25 mg tablet Take 1 tablet (25 mg total) by mouth 3 (three) times daily as needed for Dizziness or Nausea. 30 tablet 9/15/2024 10/15/2024 Stevo Dozier MD    ALPRAZolam (XANAX) 0.5 MG tablet Take 0.5 tablets (0.25 mg total) by mouth 3 (three) times daily as needed for Anxiety (do not drive while taking this medication). 21 tablet 9/15/2024 9/29/2024 Stevo Dozier MD          Follow-up Information       Follow up With Specialties Details Why Contact Info    The nearest emergency department.  Go to  As needed, If symptoms worsen     Your PCP  Call  As needed, for ongoing care              Stevo Dozier MD  09/16/24 [id number]

## 2024-09-16 NOTE — DISCHARGE INSTRUCTIONS
Avoid use of OTC non-drowsy cold/flu medication and limit/avoid caffeine (i.e. coffee, tea, Coke, Pepsi, Dr Pepper, Barq's, Mountain Dew, energy drinks, etc) intake while symptoms persist.

## 2024-10-26 ENCOUNTER — HOSPITAL ENCOUNTER (EMERGENCY)
Facility: HOSPITAL | Age: 29
Discharge: HOME | End: 2024-10-26
Attending: EMERGENCY MEDICINE
Payer: MEDICAID

## 2024-10-26 VITALS
OXYGEN SATURATION: 100 % | RESPIRATION RATE: 14 BRPM | DIASTOLIC BLOOD PRESSURE: 91 MMHG | TEMPERATURE: 98 F | HEART RATE: 68 BPM | BODY MASS INDEX: 28.45 KG/M2 | WEIGHT: 166.63 LBS | SYSTOLIC BLOOD PRESSURE: 148 MMHG | HEIGHT: 64 IN

## 2024-10-26 DIAGNOSIS — K08.89 DENTALGIA: Primary | ICD-10-CM

## 2024-10-26 PROCEDURE — 99284 EMERGENCY DEPT VISIT MOD MDM: CPT

## 2024-10-26 PROCEDURE — 25000003 PHARM REV CODE 250: Performed by: CLINICAL NURSE SPECIALIST

## 2024-10-26 RX ORDER — KETOROLAC TROMETHAMINE 10 MG/1
10 TABLET, FILM COATED ORAL
Status: COMPLETED | OUTPATIENT
Start: 2024-10-26 | End: 2024-10-26

## 2024-10-26 RX ORDER — PENICILLIN V POTASSIUM 500 MG/1
500 TABLET, FILM COATED ORAL 4 TIMES DAILY
Qty: 28 TABLET | Refills: 0 | Status: SHIPPED | OUTPATIENT
Start: 2024-10-26 | End: 2024-11-02

## 2024-10-26 RX ORDER — SPIRONOLACTONE 25 MG/1
25 TABLET ORAL DAILY
COMMUNITY

## 2024-10-26 RX ORDER — KETOROLAC TROMETHAMINE 10 MG/1
10 TABLET, FILM COATED ORAL EVERY 6 HOURS
Qty: 20 TABLET | Refills: 0 | Status: SHIPPED | OUTPATIENT
Start: 2024-10-26 | End: 2024-10-31

## 2024-10-26 RX ORDER — PENICILLIN V POTASSIUM 250 MG/1
500 TABLET, FILM COATED ORAL ONCE
Status: COMPLETED | OUTPATIENT
Start: 2024-10-26 | End: 2024-10-26

## 2024-10-26 RX ORDER — LIDOCAINE HYDROCHLORIDE 20 MG/ML
15 SOLUTION OROPHARYNGEAL ONCE
Status: COMPLETED | OUTPATIENT
Start: 2024-10-26 | End: 2024-10-26

## 2024-10-26 RX ORDER — LIDOCAINE HYDROCHLORIDE 20 MG/ML
SOLUTION OROPHARYNGEAL
Qty: 120 ML | Refills: 0 | Status: SHIPPED | OUTPATIENT
Start: 2024-10-26

## 2024-10-26 RX ADMIN — LIDOCAINE HYDROCHLORIDE 15 ML: 20 SOLUTION ORAL at 06:10

## 2024-10-26 RX ADMIN — KETOROLAC TROMETHAMINE 10 MG: 10 TABLET, FILM COATED ORAL at 06:10

## 2024-10-26 RX ADMIN — PENICILLIN V POTASSIUM 500 MG: 250 TABLET, FILM COATED ORAL at 06:10

## (undated) DEVICE — TROCAR KII FIOS 5MM X 100MM

## (undated) DEVICE — SUT POLY CV-0 36 THX-36

## (undated) DEVICE — GLOVE PROTEXIS HYDROGEL SZ7.5

## (undated) DEVICE — TUBING MEDI-VAC 20FT .25IN

## (undated) DEVICE — MANIFOLD 4 PORT

## (undated) DEVICE — NDL HYPO REG 25G X 1 1/2

## (undated) DEVICE — SUT ETHIBOND EXCEL 0 MO6 18

## (undated) DEVICE — DRSNG POLYSKIN TRNSPAR 4X4.75

## (undated) DEVICE — SORBAFIX 30 COUNT.

## (undated) DEVICE — KIT LAPAROSCOPY UNIVERSITY

## (undated) DEVICE — SHEARS HARMONIC CRVD TIP 36CM

## (undated) DEVICE — DRESSING POLYSKIN II 2X2.75IN

## (undated) DEVICE — SYR 10CC LUER LOCK

## (undated) DEVICE — SUT 0 VICRYL / UR6 (J603)

## (undated) DEVICE — SUT MCRYL PLUS 4-0 PS2 27IN

## (undated) DEVICE — SOL IRRI STRL WATER 1000ML

## (undated) DEVICE — SLEEVE KII ADV FIX 5X100MM

## (undated) DEVICE — SUT CTD VICRYL 3-0 CR/SH

## (undated) DEVICE — SUT X425H ETHIBOND 1-0

## (undated) DEVICE — HANDLE MEDIVAC SUC YANK BLBOUS

## (undated) DEVICE — APPLICATOR CHLORAPREP ORN 26ML

## (undated) DEVICE — ADHESIVE DERMABOND ADVANCED

## (undated) DEVICE — NDL PNEUMO INSUFFLATI 120MM

## (undated) DEVICE — GLOVE PROTEXIS BLUE LATEX 6.5

## (undated) DEVICE — BINDER ABDOM 4PANEL 12IN SM/MD

## (undated) DEVICE — SUT PDSII 4-0 PS-2 CLEAR MO

## (undated) DEVICE — KIT ANTIFOG W/SPONG & FLUID

## (undated) DEVICE — BINDER ABDOMINAL UNIV XLN 10IN

## (undated) DEVICE — GOWN POLY REINF BRTH SLV XL

## (undated) DEVICE — HANDLE DEVON RIGID OR LIGHT

## (undated) DEVICE — GLOVE PROTEXIS LTX MICRO  7

## (undated) DEVICE — APPLICATOR COTTON TIP 6IN STRL

## (undated) DEVICE — GLOVE PROTEXIS LTX MICRO 6.5

## (undated) DEVICE — GLOVE PROTEXIS HYDROGEL SZ7

## (undated) DEVICE — GLOVE PROTEXIS BLUE LATEX 7.5